# Patient Record
Sex: MALE | Race: AMERICAN INDIAN OR ALASKA NATIVE | NOT HISPANIC OR LATINO | Employment: OTHER | URBAN - METROPOLITAN AREA
[De-identification: names, ages, dates, MRNs, and addresses within clinical notes are randomized per-mention and may not be internally consistent; named-entity substitution may affect disease eponyms.]

---

## 2020-10-01 ENCOUNTER — APPOINTMENT (OUTPATIENT)
Dept: RADIOLOGY | Facility: MEDICAL CENTER | Age: 60
End: 2020-10-01
Attending: EMERGENCY MEDICINE
Payer: MEDICARE

## 2020-10-01 ENCOUNTER — HOSPITAL ENCOUNTER (OUTPATIENT)
Facility: MEDICAL CENTER | Age: 60
End: 2020-10-03
Attending: EMERGENCY MEDICINE | Admitting: INTERNAL MEDICINE
Payer: MEDICARE

## 2020-10-01 DIAGNOSIS — I20.0 UNSTABLE ANGINA (HCC): ICD-10-CM

## 2020-10-01 PROBLEM — E11.9 TYPE 2 DIABETES MELLITUS, WITHOUT LONG-TERM CURRENT USE OF INSULIN (HCC): Status: ACTIVE | Noted: 2020-10-01

## 2020-10-01 PROBLEM — R42 POSTURAL DIZZINESS WITH PRESYNCOPE: Status: ACTIVE | Noted: 2020-10-01

## 2020-10-01 PROBLEM — E87.29 HIGH ANION GAP METABOLIC ACIDOSIS: Status: ACTIVE | Noted: 2020-10-01

## 2020-10-01 PROBLEM — K21.9 GERD (GASTROESOPHAGEAL REFLUX DISEASE): Status: ACTIVE | Noted: 2020-10-01

## 2020-10-01 PROBLEM — R55 POSTURAL DIZZINESS WITH PRESYNCOPE: Status: ACTIVE | Noted: 2020-10-01

## 2020-10-01 LAB
ALBUMIN SERPL BCP-MCNC: 4.8 G/DL (ref 3.2–4.9)
ALBUMIN/GLOB SERPL: 1.3 G/DL
ALP SERPL-CCNC: 91 U/L (ref 30–99)
ALT SERPL-CCNC: 13 U/L (ref 2–50)
ANION GAP SERPL CALC-SCNC: 17 MMOL/L (ref 7–16)
APTT PPP: 29.5 SEC (ref 24.7–36)
AST SERPL-CCNC: 19 U/L (ref 12–45)
BASOPHILS # BLD AUTO: 0.6 % (ref 0–1.8)
BASOPHILS # BLD: 0.05 K/UL (ref 0–0.12)
BILIRUB SERPL-MCNC: 0.4 MG/DL (ref 0.1–1.5)
BUN SERPL-MCNC: 13 MG/DL (ref 8–22)
CALCIUM SERPL-MCNC: 9.8 MG/DL (ref 8.5–10.5)
CHLORIDE SERPL-SCNC: 98 MMOL/L (ref 96–112)
CO2 SERPL-SCNC: 19 MMOL/L (ref 20–33)
COVID ORDER STATUS COVID19: NORMAL
CREAT SERPL-MCNC: 0.89 MG/DL (ref 0.5–1.4)
EKG IMPRESSION: NORMAL
EOSINOPHIL # BLD AUTO: 0.34 K/UL (ref 0–0.51)
EOSINOPHIL NFR BLD: 4 % (ref 0–6.9)
ERYTHROCYTE [DISTWIDTH] IN BLOOD BY AUTOMATED COUNT: 46 FL (ref 35.9–50)
GLOBULIN SER CALC-MCNC: 3.6 G/DL (ref 1.9–3.5)
GLUCOSE BLD-MCNC: 149 MG/DL (ref 65–99)
GLUCOSE SERPL-MCNC: 151 MG/DL (ref 65–99)
HCT VFR BLD AUTO: 41.3 % (ref 42–52)
HGB BLD-MCNC: 13 G/DL (ref 14–18)
IMM GRANULOCYTES # BLD AUTO: 0.01 K/UL (ref 0–0.11)
IMM GRANULOCYTES NFR BLD AUTO: 0.1 % (ref 0–0.9)
INR PPP: 1.02 (ref 0.87–1.13)
LYMPHOCYTES # BLD AUTO: 2.88 K/UL (ref 1–4.8)
LYMPHOCYTES NFR BLD: 34 % (ref 22–41)
MCH RBC QN AUTO: 23.9 PG (ref 27–33)
MCHC RBC AUTO-ENTMCNC: 31.5 G/DL (ref 33.7–35.3)
MCV RBC AUTO: 75.8 FL (ref 81.4–97.8)
MONOCYTES # BLD AUTO: 0.58 K/UL (ref 0–0.85)
MONOCYTES NFR BLD AUTO: 6.8 % (ref 0–13.4)
NEUTROPHILS # BLD AUTO: 4.62 K/UL (ref 1.82–7.42)
NEUTROPHILS NFR BLD: 54.5 % (ref 44–72)
NRBC # BLD AUTO: 0 K/UL
NRBC BLD-RTO: 0 /100 WBC
PLATELET # BLD AUTO: 405 K/UL (ref 164–446)
PMV BLD AUTO: 10.4 FL (ref 9–12.9)
POTASSIUM SERPL-SCNC: 4.4 MMOL/L (ref 3.6–5.5)
PROT SERPL-MCNC: 8.4 G/DL (ref 6–8.2)
PROTHROMBIN TIME: 13.7 SEC (ref 12–14.6)
RBC # BLD AUTO: 5.45 M/UL (ref 4.7–6.1)
SARS-COV-2 RNA RESP QL NAA+PROBE: NOTDETECTED
SODIUM SERPL-SCNC: 134 MMOL/L (ref 135–145)
SPECIMEN SOURCE: NORMAL
TROPONIN T SERPL-MCNC: 10 NG/L (ref 6–19)
TROPONIN T SERPL-MCNC: 11 NG/L (ref 6–19)
TROPONIN T SERPL-MCNC: 11 NG/L (ref 6–19)
UFH PPP CHRO-ACNC: <0.1 IU/ML
WBC # BLD AUTO: 8.5 K/UL (ref 4.8–10.8)

## 2020-10-01 PROCEDURE — A9270 NON-COVERED ITEM OR SERVICE: HCPCS | Performed by: INTERNAL MEDICINE

## 2020-10-01 PROCEDURE — 93005 ELECTROCARDIOGRAM TRACING: CPT | Performed by: EMERGENCY MEDICINE

## 2020-10-01 PROCEDURE — 82962 GLUCOSE BLOOD TEST: CPT

## 2020-10-01 PROCEDURE — 700111 HCHG RX REV CODE 636 W/ 250 OVERRIDE (IP): Performed by: INTERNAL MEDICINE

## 2020-10-01 PROCEDURE — 85730 THROMBOPLASTIN TIME PARTIAL: CPT

## 2020-10-01 PROCEDURE — 99285 EMERGENCY DEPT VISIT HI MDM: CPT

## 2020-10-01 PROCEDURE — G0378 HOSPITAL OBSERVATION PER HR: HCPCS

## 2020-10-01 PROCEDURE — 93005 ELECTROCARDIOGRAM TRACING: CPT

## 2020-10-01 PROCEDURE — 85610 PROTHROMBIN TIME: CPT

## 2020-10-01 PROCEDURE — A9270 NON-COVERED ITEM OR SERVICE: HCPCS | Performed by: EMERGENCY MEDICINE

## 2020-10-01 PROCEDURE — 99220 PR INITIAL OBSERVATION CARE,LEVL III: CPT | Performed by: INTERNAL MEDICINE

## 2020-10-01 PROCEDURE — 94760 N-INVAS EAR/PLS OXIMETRY 1: CPT

## 2020-10-01 PROCEDURE — 96374 THER/PROPH/DIAG INJ IV PUSH: CPT

## 2020-10-01 PROCEDURE — 700102 HCHG RX REV CODE 250 W/ 637 OVERRIDE(OP): Performed by: INTERNAL MEDICINE

## 2020-10-01 PROCEDURE — 85025 COMPLETE CBC W/AUTO DIFF WBC: CPT

## 2020-10-01 PROCEDURE — 84484 ASSAY OF TROPONIN QUANT: CPT

## 2020-10-01 PROCEDURE — 80053 COMPREHEN METABOLIC PANEL: CPT

## 2020-10-01 PROCEDURE — 71045 X-RAY EXAM CHEST 1 VIEW: CPT

## 2020-10-01 PROCEDURE — U0003 INFECTIOUS AGENT DETECTION BY NUCLEIC ACID (DNA OR RNA); SEVERE ACUTE RESPIRATORY SYNDROME CORONAVIRUS 2 (SARS-COV-2) (CORONAVIRUS DISEASE [COVID-19]), AMPLIFIED PROBE TECHNIQUE, MAKING USE OF HIGH THROUGHPUT TECHNOLOGIES AS DESCRIBED BY CMS-2020-01-R: HCPCS

## 2020-10-01 PROCEDURE — 700105 HCHG RX REV CODE 258: Performed by: INTERNAL MEDICINE

## 2020-10-01 PROCEDURE — 85520 HEPARIN ASSAY: CPT

## 2020-10-01 PROCEDURE — 700102 HCHG RX REV CODE 250 W/ 637 OVERRIDE(OP): Performed by: EMERGENCY MEDICINE

## 2020-10-01 PROCEDURE — 99205 OFFICE O/P NEW HI 60 MIN: CPT | Performed by: INTERNAL MEDICINE

## 2020-10-01 PROCEDURE — C9803 HOPD COVID-19 SPEC COLLECT: HCPCS | Performed by: INTERNAL MEDICINE

## 2020-10-01 PROCEDURE — 36415 COLL VENOUS BLD VENIPUNCTURE: CPT

## 2020-10-01 RX ORDER — PROMETHAZINE HYDROCHLORIDE 25 MG/1
12.5-25 TABLET ORAL EVERY 4 HOURS PRN
Status: DISCONTINUED | OUTPATIENT
Start: 2020-10-01 | End: 2020-10-03 | Stop reason: HOSPADM

## 2020-10-01 RX ORDER — ALPRAZOLAM 0.25 MG/1
1 TABLET ORAL
Status: ON HOLD | COMMUNITY
End: 2021-02-17

## 2020-10-01 RX ORDER — PROCHLORPERAZINE EDISYLATE 5 MG/ML
5-10 INJECTION INTRAMUSCULAR; INTRAVENOUS EVERY 4 HOURS PRN
Status: DISCONTINUED | OUTPATIENT
Start: 2020-10-01 | End: 2020-10-03 | Stop reason: HOSPADM

## 2020-10-01 RX ORDER — HEPARIN SODIUM 1000 [USP'U]/ML
40 INJECTION, SOLUTION INTRAVENOUS; SUBCUTANEOUS PRN
Status: DISCONTINUED | OUTPATIENT
Start: 2020-10-01 | End: 2020-10-02

## 2020-10-01 RX ORDER — TRAMADOL HYDROCHLORIDE 50 MG/1
50 TABLET ORAL 3 TIMES DAILY PRN
Status: DISCONTINUED | OUTPATIENT
Start: 2020-10-01 | End: 2020-10-03 | Stop reason: HOSPADM

## 2020-10-01 RX ORDER — ROSUVASTATIN CALCIUM 40 MG/1
40 TABLET, COATED ORAL EVERY EVENING
COMMUNITY
End: 2021-05-24 | Stop reason: SDUPTHER

## 2020-10-01 RX ORDER — CLOPIDOGREL BISULFATE 75 MG/1
75 TABLET ORAL DAILY
Status: ON HOLD | COMMUNITY
End: 2020-10-03

## 2020-10-01 RX ORDER — ONDANSETRON 4 MG/1
4 TABLET, ORALLY DISINTEGRATING ORAL EVERY 4 HOURS PRN
Status: DISCONTINUED | OUTPATIENT
Start: 2020-10-01 | End: 2020-10-03 | Stop reason: HOSPADM

## 2020-10-01 RX ORDER — AMOXICILLIN 250 MG
2 CAPSULE ORAL 2 TIMES DAILY
Status: DISCONTINUED | OUTPATIENT
Start: 2020-10-01 | End: 2020-10-03 | Stop reason: HOSPADM

## 2020-10-01 RX ORDER — DEXTROSE MONOHYDRATE 25 G/50ML
50 INJECTION, SOLUTION INTRAVENOUS
Status: DISCONTINUED | OUTPATIENT
Start: 2020-10-01 | End: 2020-10-02

## 2020-10-01 RX ORDER — PROMETHAZINE HYDROCHLORIDE 25 MG/1
12.5-25 SUPPOSITORY RECTAL EVERY 4 HOURS PRN
Status: DISCONTINUED | OUTPATIENT
Start: 2020-10-01 | End: 2020-10-03 | Stop reason: HOSPADM

## 2020-10-01 RX ORDER — AMLODIPINE BESYLATE 10 MG/1
10 TABLET ORAL DAILY
Status: DISCONTINUED | OUTPATIENT
Start: 2020-10-01 | End: 2020-10-03 | Stop reason: HOSPADM

## 2020-10-01 RX ORDER — METOPROLOL SUCCINATE 50 MG/1
50 TABLET, EXTENDED RELEASE ORAL DAILY
Status: ON HOLD | COMMUNITY
End: 2020-10-03

## 2020-10-01 RX ORDER — OMEPRAZOLE 40 MG/1
40 CAPSULE, DELAYED RELEASE ORAL 2 TIMES DAILY
COMMUNITY
End: 2021-06-04 | Stop reason: SDUPTHER

## 2020-10-01 RX ORDER — HYDROCODONE BITARTRATE AND ACETAMINOPHEN 5; 325 MG/1; MG/1
1 TABLET ORAL
COMMUNITY
End: 2020-12-10

## 2020-10-01 RX ORDER — ISOSORBIDE MONONITRATE 30 MG/1
60 TABLET, EXTENDED RELEASE ORAL EVERY EVENING
Status: DISCONTINUED | OUTPATIENT
Start: 2020-10-01 | End: 2020-10-03 | Stop reason: HOSPADM

## 2020-10-01 RX ORDER — RANOLAZINE 500 MG/1
1000 TABLET, EXTENDED RELEASE ORAL 2 TIMES DAILY
Status: DISCONTINUED | OUTPATIENT
Start: 2020-10-01 | End: 2020-10-02

## 2020-10-01 RX ORDER — METFORMIN HYDROCHLORIDE 500 MG/1
500 TABLET, EXTENDED RELEASE ORAL 3 TIMES DAILY
COMMUNITY
End: 2021-05-10 | Stop reason: SDUPTHER

## 2020-10-01 RX ORDER — CLOPIDOGREL BISULFATE 75 MG/1
75 TABLET ORAL DAILY
Status: DISCONTINUED | OUTPATIENT
Start: 2020-10-01 | End: 2020-10-02

## 2020-10-01 RX ORDER — METOPROLOL SUCCINATE 50 MG/1
50 TABLET, EXTENDED RELEASE ORAL DAILY
Status: DISCONTINUED | OUTPATIENT
Start: 2020-10-01 | End: 2020-10-02

## 2020-10-01 RX ORDER — MORPHINE SULFATE 4 MG/ML
2 INJECTION, SOLUTION INTRAMUSCULAR; INTRAVENOUS
Status: DISCONTINUED | OUTPATIENT
Start: 2020-10-01 | End: 2020-10-01

## 2020-10-01 RX ORDER — HEPARIN SODIUM 5000 [USP'U]/100ML
0-30 INJECTION, SOLUTION INTRAVENOUS CONTINUOUS
Status: DISCONTINUED | OUTPATIENT
Start: 2020-10-01 | End: 2020-10-02

## 2020-10-01 RX ORDER — HEPARIN SODIUM 1000 [USP'U]/ML
80 INJECTION, SOLUTION INTRAVENOUS; SUBCUTANEOUS ONCE
Status: COMPLETED | OUTPATIENT
Start: 2020-10-01 | End: 2020-10-01

## 2020-10-01 RX ORDER — TRAMADOL HYDROCHLORIDE 50 MG/1
50 TABLET ORAL 3 TIMES DAILY PRN
COMMUNITY
End: 2021-02-22

## 2020-10-01 RX ORDER — ACETAMINOPHEN 325 MG/1
650 TABLET ORAL EVERY 6 HOURS PRN
Status: DISCONTINUED | OUTPATIENT
Start: 2020-10-01 | End: 2020-10-03 | Stop reason: HOSPADM

## 2020-10-01 RX ORDER — NITROGLYCERIN 0.4 MG/1
0.4 TABLET SUBLINGUAL PRN
Status: ON HOLD | COMMUNITY
End: 2021-02-17

## 2020-10-01 RX ORDER — ONDANSETRON 2 MG/ML
4 INJECTION INTRAMUSCULAR; INTRAVENOUS EVERY 4 HOURS PRN
Status: DISCONTINUED | OUTPATIENT
Start: 2020-10-01 | End: 2020-10-03 | Stop reason: HOSPADM

## 2020-10-01 RX ORDER — AMLODIPINE BESYLATE 10 MG/1
10 TABLET ORAL DAILY
COMMUNITY
End: 2021-06-02 | Stop reason: SDUPTHER

## 2020-10-01 RX ORDER — POLYETHYLENE GLYCOL 3350 17 G/17G
1 POWDER, FOR SOLUTION ORAL
Status: DISCONTINUED | OUTPATIENT
Start: 2020-10-01 | End: 2020-10-03 | Stop reason: HOSPADM

## 2020-10-01 RX ORDER — ENALAPRILAT 1.25 MG/ML
1.25 INJECTION INTRAVENOUS EVERY 6 HOURS PRN
Status: DISCONTINUED | OUTPATIENT
Start: 2020-10-01 | End: 2020-10-03 | Stop reason: HOSPADM

## 2020-10-01 RX ORDER — SODIUM CHLORIDE 9 MG/ML
INJECTION, SOLUTION INTRAVENOUS CONTINUOUS
Status: DISCONTINUED | OUTPATIENT
Start: 2020-10-01 | End: 2020-10-02

## 2020-10-01 RX ORDER — OXYCODONE HYDROCHLORIDE 5 MG/1
2.5 TABLET ORAL
Status: DISCONTINUED | OUTPATIENT
Start: 2020-10-01 | End: 2020-10-01

## 2020-10-01 RX ORDER — ROSUVASTATIN CALCIUM 20 MG/1
40 TABLET, COATED ORAL EVERY EVENING
Status: DISCONTINUED | OUTPATIENT
Start: 2020-10-01 | End: 2020-10-03 | Stop reason: HOSPADM

## 2020-10-01 RX ORDER — RANOLAZINE 1000 MG/1
1000 TABLET, EXTENDED RELEASE ORAL 2 TIMES DAILY
Status: ON HOLD | COMMUNITY
End: 2020-10-03

## 2020-10-01 RX ORDER — OXYCODONE HYDROCHLORIDE 5 MG/1
5 TABLET ORAL
Status: DISCONTINUED | OUTPATIENT
Start: 2020-10-01 | End: 2020-10-01

## 2020-10-01 RX ORDER — ISOSORBIDE MONONITRATE 60 MG/1
60 TABLET, EXTENDED RELEASE ORAL DAILY
COMMUNITY
End: 2021-03-22 | Stop reason: SDUPTHER

## 2020-10-01 RX ORDER — BISACODYL 10 MG
10 SUPPOSITORY, RECTAL RECTAL
Status: DISCONTINUED | OUTPATIENT
Start: 2020-10-01 | End: 2020-10-03 | Stop reason: HOSPADM

## 2020-10-01 RX ORDER — OMEPRAZOLE 20 MG/1
20 CAPSULE, DELAYED RELEASE ORAL DAILY
Status: DISCONTINUED | OUTPATIENT
Start: 2020-10-01 | End: 2020-10-03 | Stop reason: HOSPADM

## 2020-10-01 RX ADMIN — HEPARIN SODIUM 5700 UNITS: 1000 INJECTION, SOLUTION INTRAVENOUS; SUBCUTANEOUS at 11:40

## 2020-10-01 RX ADMIN — OMEPRAZOLE 20 MG: 20 CAPSULE, DELAYED RELEASE ORAL at 15:00

## 2020-10-01 RX ADMIN — ROSUVASTATIN CALCIUM 40 MG: 20 TABLET, FILM COATED ORAL at 16:53

## 2020-10-01 RX ADMIN — CLOPIDOGREL BISULFATE 75 MG: 75 TABLET ORAL at 16:52

## 2020-10-01 RX ADMIN — HEPARIN SODIUM 18 UNITS/KG/HR: 5000 INJECTION, SOLUTION INTRAVENOUS at 11:40

## 2020-10-01 RX ADMIN — SODIUM CHLORIDE: 9 INJECTION, SOLUTION INTRAVENOUS at 12:45

## 2020-10-01 RX ADMIN — ISOSORBIDE MONONITRATE 60 MG: 30 TABLET, EXTENDED RELEASE ORAL at 20:08

## 2020-10-01 RX ADMIN — TRAMADOL HYDROCHLORIDE 50 MG: 50 TABLET, FILM COATED ORAL at 21:58

## 2020-10-01 RX ADMIN — RANOLAZINE 1000 MG: 500 TABLET, FILM COATED, EXTENDED RELEASE ORAL at 20:09

## 2020-10-01 RX ADMIN — NITROGLYCERIN 1 INCH: 20 OINTMENT TOPICAL at 08:55

## 2020-10-01 RX ADMIN — SODIUM CHLORIDE: 9 INJECTION, SOLUTION INTRAVENOUS at 16:46

## 2020-10-01 RX ADMIN — METOPROLOL SUCCINATE 50 MG: 50 TABLET, EXTENDED RELEASE ORAL at 16:52

## 2020-10-01 SDOH — HEALTH STABILITY: MENTAL HEALTH: HOW OFTEN DO YOU HAVE 6 OR MORE DRINKS ON ONE OCCASION?: NEVER

## 2020-10-01 SDOH — ECONOMIC STABILITY: FOOD INSECURITY: WITHIN THE PAST 12 MONTHS, YOU WORRIED THAT YOUR FOOD WOULD RUN OUT BEFORE YOU GOT MONEY TO BUY MORE.: PATIENT DECLINED

## 2020-10-01 SDOH — ECONOMIC STABILITY: TRANSPORTATION INSECURITY
IN THE PAST 12 MONTHS, HAS LACK OF TRANSPORTATION KEPT YOU FROM MEETINGS, WORK, OR FROM GETTING THINGS NEEDED FOR DAILY LIVING?: PATIENT DECLINED

## 2020-10-01 SDOH — HEALTH STABILITY: MENTAL HEALTH: HOW OFTEN DO YOU HAVE A DRINK CONTAINING ALCOHOL?: NEVER

## 2020-10-01 SDOH — ECONOMIC STABILITY: TRANSPORTATION INSECURITY
IN THE PAST 12 MONTHS, HAS THE LACK OF TRANSPORTATION KEPT YOU FROM MEDICAL APPOINTMENTS OR FROM GETTING MEDICATIONS?: PATIENT DECLINED

## 2020-10-01 SDOH — ECONOMIC STABILITY: FOOD INSECURITY: WITHIN THE PAST 12 MONTHS, THE FOOD YOU BOUGHT JUST DIDN'T LAST AND YOU DIDN'T HAVE MONEY TO GET MORE.: PATIENT DECLINED

## 2020-10-01 ASSESSMENT — ENCOUNTER SYMPTOMS
BLURRED VISION: 0
FEVER: 0
SHORTNESS OF BREATH: 1
COUGH: 0
VOMITING: 0
LOSS OF CONSCIOUSNESS: 0
DIARRHEA: 0
FALLS: 0
PALPITATIONS: 1
ABDOMINAL PAIN: 0
CHILLS: 0
WEAKNESS: 0
NERVOUS/ANXIOUS: 0
SORE THROAT: 0
NAUSEA: 0
DIAPHORESIS: 1
DIZZINESS: 1
DEPRESSION: 0
HEADACHES: 0
CONSTIPATION: 0

## 2020-10-01 ASSESSMENT — PAIN DESCRIPTION - PAIN TYPE: TYPE: CHRONIC PAIN

## 2020-10-01 NOTE — ED TRIAGE NOTES
Chief Complaint   Patient presents with   • Chest Pain     Pt reports CP and SOB that has increased the past 2 wks. pt reports extensive heart hx with multiple stents. Pt reports near syncopal episode upon exertion.    • Near Syncopal     Pt/staff masked and in appropriate PPE during encounter.

## 2020-10-01 NOTE — ASSESSMENT & PLAN NOTE
Patient underwent heart cath for which she was noted for high-grade ostial concentric stenosis at the  diagonal branch originating through the left anterior descending artery stent strut into the mid portion   PCI was attempted but unsuccessful  Switched to Prasugrel  His beta-blocker dose was increased  Monitoring telemetry overnight

## 2020-10-01 NOTE — ASSESSMENT & PLAN NOTE
Hold outpatient metformin.  Start on insulin sliding scale.  Check A1c  Adjust diabetic management prior to discharge

## 2020-10-01 NOTE — ED PROVIDER NOTES
ED Provider Note    Scribed for Demarcus Ramos M.D. by Court Collazo. 10/1/2020  8:26 AM    Primary care provider: No primary care provider noted.  Means of arrival: Walk in   History obtained from: Patient   History limited by: None     CHIEF COMPLAINT  Chief Complaint   Patient presents with   • Chest Pain     Pt reports CP and SOB that has increased the past 2 wks. pt reports extensive heart hx with multiple stents. Pt reports near syncopal episode upon exertion.    • Near Syncopal       HPI  Gabino Mckeon is a 60 y.o. male who presents to the Emergency Department for chest pain onset 2 weeks ago. The patient states that his symptoms come and go and have worsened over time. He localizes his pain to the left side of his chest, and states that it radiates to his right arm. He describes it as dull in quality. He says that he experiences them multiple times a day. He reports associated shortness of breath, diaphoresis, and a near syncopal episode upon exertion. Patient says he has had 8 cardiac stents placed, the last one being placed in February. He also reports a history of diabetes, hypertension, and bypass, however he denies any history of heart attacks. Patient denies cough, congestion, or fever. He also states that he used to smoke cigarettes, but does not smoke anymore.     REVIEW OF SYSTEMS  Pertinent positives include chest pain, shortness of breath, diaphoresis, and a near syncopal episode upon exertion. Pertinent negatives include no cough, congestion, or fever.  All other systems reviewed and negative.    PAST MEDICAL HISTORY   has a past medical history of Anginal syndrome (HCC), At risk for sleep apnea, Back pain, Diabetes (HCC), Fall, H/O heart artery stent, and Hypertension.    SURGICAL HISTORY   has a past surgical history that includes other cardiac surgery; other abdominal surgery; and other orthopedic surgery.    SOCIAL HISTORY  Social History     Tobacco Use   • Smoking status: Former  "Smoker     Quit date: 10/1/2000     Years since quittin.0   • Smokeless tobacco: Never Used   Substance Use Topics   • Alcohol use: Never     Frequency: Never     Binge frequency: Never   • Drug use: Never      Social History     Substance and Sexual Activity   Drug Use Never       FAMILY HISTORY  Family History   Problem Relation Age of Onset   • Heart Disease Mother    • Hypertension Mother    • Heart Disease Father    • Hypertension Father    • Heart Disease Brother        CURRENT MEDICATIONS  Home Medications     Reviewed by Milton Forrester R.N. (Registered Nurse) on 10/01/20 at 1456  Med List Status: Complete   Medication Last Dose Status   ALPRAZolam (XANAX) 0.25 MG Tab 2020 Active   amLODIPine (NORVASC) 10 MG Tab 2020 Active   aspirin EC (ECOTRIN) 81 MG Tablet Delayed Response 10/1/2020 Active   clopidogrel (PLAVIX) 75 MG Tab 2020 Active   HYDROcodone-acetaminophen (NORCO) 5-325 MG Tab per tablet 2020 Active   isosorbide mononitrate SR (IMDUR) 60 MG TABLET SR 24 HR 2020 Active   MAGNESIUM PO 2020 Active   metFORMIN ER (GLUCOPHAGE XR) 500 MG TABLET SR 24 HR 2020 Active   metoprolol SR (TOPROL XL) 50 MG TABLET SR 24 HR 2020 Active   nitroglycerin (NITROSTAT) 0.4 MG SL Tab 2020 Active   omeprazole (PRILOSEC) 40 MG delayed-release capsule 2020 Active   Ranolazine 1000 MG TABLET SR 12 HR 2020 Active   rosuvastatin (CRESTOR) 40 MG tablet 2020 Active   tramadol (ULTRAM) 50 MG Tab 10/1/2020 Active                ALLERGIES  No Known Allergies    PHYSICAL EXAM  VITAL SIGNS: /92   Pulse 94   Temp 36 °C (96.8 °F) (Temporal)   Resp 18   Ht 1.626 m (5' 4\")   Wt 70.9 kg (156 lb 4.9 oz)   SpO2 99%   BMI 26.83 kg/m²     Constitutional: Well developed, Well nourished, mild distress, Non-toxic appearance.   HENT: Normocephalic, Atraumatic, Bilateral external ears normal, Oropharynx moist, No oral exudates.   Eyes: PERRLA, EOMI, Conjunctiva normal, No " discharge.   Neck: No tenderness, Supple, No stridor.   Lymphatic: No lymphadenopathy noted.   Cardiovascular: Normal heart rate, Normal rhythm. Large central sternal scar. No lower extremity edema.  Thorax & Lungs: Clear to auscultation bilaterally, No respiratory distress, No wheezing, No crackles.   Abdomen: Soft, No tenderness, No masses, No pulsatile masses.   Skin: Warm, Dry, No erythema, No rash.   Extremities:, No edema No cyanosis.   Musculoskeletal: No tenderness to palpation or major deformities noted.  Intact distal pulses  Neurologic: Awake, alert. Moves all extremities spontaneously.  Psychiatric: Affect normal, Judgment normal, Mood normal.     LABS  Results for orders placed or performed during the hospital encounter of 10/01/20   CBC with Differential   Result Value Ref Range    WBC 8.5 4.8 - 10.8 K/uL    RBC 5.45 4.70 - 6.10 M/uL    Hemoglobin 13.0 (L) 14.0 - 18.0 g/dL    Hematocrit 41.3 (L) 42.0 - 52.0 %    MCV 75.8 (L) 81.4 - 97.8 fL    MCH 23.9 (L) 27.0 - 33.0 pg    MCHC 31.5 (L) 33.7 - 35.3 g/dL    RDW 46.0 35.9 - 50.0 fL    Platelet Count 405 164 - 446 K/uL    MPV 10.4 9.0 - 12.9 fL    Neutrophils-Polys 54.50 44.00 - 72.00 %    Lymphocytes 34.00 22.00 - 41.00 %    Monocytes 6.80 0.00 - 13.40 %    Eosinophils 4.00 0.00 - 6.90 %    Basophils 0.60 0.00 - 1.80 %    Immature Granulocytes 0.10 0.00 - 0.90 %    Nucleated RBC 0.00 /100 WBC    Neutrophils (Absolute) 4.62 1.82 - 7.42 K/uL    Lymphs (Absolute) 2.88 1.00 - 4.80 K/uL    Monos (Absolute) 0.58 0.00 - 0.85 K/uL    Eos (Absolute) 0.34 0.00 - 0.51 K/uL    Baso (Absolute) 0.05 0.00 - 0.12 K/uL    Immature Granulocytes (abs) 0.01 0.00 - 0.11 K/uL    NRBC (Absolute) 0.00 K/uL   Complete Metabolic Panel (CMP)   Result Value Ref Range    Sodium 134 (L) 135 - 145 mmol/L    Potassium 4.4 3.6 - 5.5 mmol/L    Chloride 98 96 - 112 mmol/L    Co2 19 (L) 20 - 33 mmol/L    Anion Gap 17.0 (H) 7.0 - 16.0    Glucose 151 (H) 65 - 99 mg/dL    Bun 13 8 - 22 mg/dL     Creatinine 0.89 0.50 - 1.40 mg/dL    Calcium 9.8 8.5 - 10.5 mg/dL    AST(SGOT) 19 12 - 45 U/L    ALT(SGPT) 13 2 - 50 U/L    Alkaline Phosphatase 91 30 - 99 U/L    Total Bilirubin 0.4 0.1 - 1.5 mg/dL    Albumin 4.8 3.2 - 4.9 g/dL    Total Protein 8.4 (H) 6.0 - 8.2 g/dL    Globulin 3.6 (H) 1.9 - 3.5 g/dL    A-G Ratio 1.3 g/dL   Troponin STAT   Result Value Ref Range    Troponin T 11 6 - 19 ng/L   ESTIMATED GFR   Result Value Ref Range    GFR If African American >60 >60 mL/min/1.73 m 2    GFR If Non African American >60 >60 mL/min/1.73 m 2   Routine (COVID/SARS COV-2 In-House PCR up to 24 hours)    Specimen: Nasopharyngeal; Respirate   Result Value Ref Range    COVID Order Status Received    aPTT   Result Value Ref Range    APTT 29.5 24.7 - 36.0 sec   Prothrombin Time   Result Value Ref Range    PT 13.7 12.0 - 14.6 sec    INR 1.02 0.87 - 1.13   Heparin Xa (Unfractionated)   Result Value Ref Range    Heparin Xa (UFH) <0.10 IU/mL   TROPONIN   Result Value Ref Range    Troponin T 10 6 - 19 ng/L   TROPONIN   Result Value Ref Range    Troponin T 11 6 - 19 ng/L   SARS-CoV-2, PCR (In-House)   Result Value Ref Range    SARS-CoV-2 Source NP Swab    EKG   Result Value Ref Range    Report       [facility] Emergency Dept.    Test Date:  2020-10-01  Pt Name:    RORO HAMILTON                Department: ER  MRN:        [id number]                      Room:  Gender:     Male                         Technician: MISAEL  :        1960                   Requested By:ER TRIAGE PROTOCOL  Order #:    [id number]                    Reading MD: TALYA POWELL MD    Measurements  Intervals                                Axis  Rate:       93                           P:          52  CT:         136                          QRS:        21  QRSD:       86                           T:          126  QT:         352  QTc:        438    Interpretive Statements  SINUS RHYTHM  VENTRICULAR TRIGEMINY  BORDERLINE T  ABNORMALITIES, ANT-LAT LEADS  No previous ECG available for comparison  Electronically Signed On 10-1-2020 9:36:06 PDT by TALYA POWELL MD          RADIOLOGY  DX-CHEST-PORTABLE (1 VIEW)   Final Result      1.  There is no acute cardiopulmonary process.      EC-ECHOCARDIOGRAM COMPLETE W/O CONT    (Results Pending)     The radiologist's interpretation of all radiological studies have been reviewed by me.      COURSE & MEDICAL DECISION MAKING  Pertinent Labs & Imaging studies reviewed. (See chart for details)    I reviewed the patient's medical records which showed a history of multiple cardiac stents. The patient has just moved to [facility].    8:26 AM - Patient seen and examined at bedside. Ordered DX-Chest, CBC with differential, SMP, Troponin STAT, and EKG to evaluate his symptoms. The differential diagnoses include but are not limited to: ACS vs near syncope    9:28 AM - Paged Cardiology.    9:29 AM - Paged Hospitalist.    9:33 AM - I discussed the patient's case and the above findings with Dr. Hancock (Hospitalist) who agrees to admit the patient for hospitalization.      9:42 AM - I discussed the patient's case and the above findings with Dr. Good (Cardiology) who agrees to consult the patient.     PPE Note: I personally donned full PPE for all patient encounters during this visit, including being clean-shaven with an N95 respirator mask, gloves, and goggles.     Scribe remained outside the patient's room and did not have any contact with the patient for the duration of patient encounter.       Decision Making:  Patient with chest pain, worsening dyspnea on exertion, worsening chest pains consistent with unstable angina, EKG and troponins are negative, got cardiology involved in the patient's extensive history, likely we will perform a cardiac catheterization, discussed the case with hospitalist for hospitalization.    HTN/IDDM FOLLOW UP:  The patient has known hypertension and is being followed by their  primary care doctor    DISPOSITION:  Patient will be hospitalized by Dr. Hancock in guarded condition.     FINAL IMPRESSION  1. Unstable angina (HCC)          I, Court Collazo (Scribe), am scribing for, and in the presence of, Demarcus Ramos M.D..    Electronically signed by: Court Collazo (Miky), 10/1/2020    IDemarcus M.D. personally performed the services described in this documentation, as scribed by Court Collazo in my presence, and it is both accurate and complete. C    The note accurately reflects work and decisions made by me.  Demarcus Ramos M.D.  10/1/2020  4:16 PM

## 2020-10-01 NOTE — CONSULTS
CARDIAC CONSULTATION    Requesting Provider: Demarcus Ramos M.D.  Reason for consultation: Chest pain    Impressions:  #. Unstable angina   -Hx CABG 2014 and multiple PCI. Angio 2/2020 LIMA patent, other grafts occluded, PCI LM with 3.25 xience, PCI Cx with 3.5 Xience, unknown status of other stents   - Historically normal LVEF  #.  Near syncope   -History of recurrent vasovagal syncope  #.  ? Diabetes      Recommendations:  Echocardiogram, heparin infusion, continue home cardiac medications    Continue Nitropatch    Cath possible PCI today    Will follow    History: Gabino Mckeon is a 60 y.o. male with a past medical history of hypertension and diabetes who I have been consulted regarding chest discomfort.  For the past 3 weeks he has been experiencing on again off again chest discomfort.  It is reliably provoked with exertion and relieved with rest.  He has had several episodes also at rest.  Episodes last 10 to 15 minutes are felt in the center of the chest and relieved with nitroglycerin.  A few days ago he also felt near syncopal with prolonged standing and had to lie down.  He also experiences dyspnea on exertion.    He reports coronary artery disease history dating back to 2014.  At that time he had multivessel coronary artery disease and underwent 5 vessel bypass.  He was told that he could even have used 7 bypasses but there was not enough room.  Subsequently he had failure of the bypasses except the LIMA and is undergone several PCI procedures, most recently February 2020.  He experienced similar chest symptoms before the revascularization procedures.    He also reports a history of syncope and near syncope over the past several years.  Several months ago he fell down and injured his back    He is in the process of relocating from [facility] and dividing his time between the locations.  He and his wife recently moved to this area to be close to his son who recently started internal medicine  "residency at [employer].    ROS:  All other systems reviewed and negative except as per the HPI    PE:  /70   Pulse 79   Temp 36 °C (96.8 °F) (Temporal)   Resp 20   Ht 1.626 m (5' 4\")   Wt 70.9 kg (156 lb 4.9 oz)   SpO2 98%   BMI 26.83 kg/m²   GEN: Well appearing  HEENT: Symmetric face. Anicteric sclerae. Moist mucus membranes  NECK: No JVD. No lymphadenopathy  CARDIAC: Normal PMI, regular, normal S1, S2.   VASCULATURE: Normal carotid amplitude without bruit. Peripheral pulses normal  RESP: Clear to auscultation bilaterally  ABD: Soft, non-tender, non-distended  EXT: No edema, no clubbing or cyanosis  SKIN: Warm and dry  NEURO: No gross deficits  PSYCH: Appropriate affect, participates in conversation      Past Medical History:   Diagnosis Date   • H/O heart artery stent     multiple     History reviewed. No pertinent surgical history.  No Known Allergies      Current Facility-Administered Medications:   •  senna-docusate (PERICOLACE or SENOKOT S) 8.6-50 MG per tablet 2 Tab, 2 Tab, Oral, BID **AND** polyethylene glycol/lytes (MIRALAX) PACKET 1 Packet, 1 Packet, Oral, QDAY PRN **AND** magnesium hydroxide (MILK OF MAGNESIA) suspension 30 mL, 30 mL, Oral, QDAY PRN **AND** bisacodyl (DULCOLAX) suppository 10 mg, 10 mg, Rectal, QDAY PRN, JAYCEE LopezO.  •  acetaminophen (TYLENOL) tablet 650 mg, 650 mg, Oral, Q6HRS PRN, JAYCEE LopezO.  •  Notify provider if pain remains uncontrolled, , , CONTINUOUS **AND** Use the numeric rating scale (NRS-11) on regular floors and Critical-Care Pain Observation Tool (CPOT) on ICUs/Trauma to assess pain, , , CONTINUOUS **AND** Pulse Ox (Oximetry), , , CONTINUOUS **AND** Pharmacy Consult Request ...Pain Management Review 1 Each, 1 Each, Other, PHARMACY TO DOSE **AND** If patient difficult to arouse and/or has respiratory depression, stop any opiates that are currently infusing and call a Rapid Response., , , CONTINUOUS **AND** oxyCODONE immediate-release (ROXICODONE) " tablet 2.5 mg, 2.5 mg, Oral, Q3HRS PRN **AND** oxyCODONE immediate-release (ROXICODONE) tablet 5 mg, 5 mg, Oral, Q3HRS PRN **AND** morphine (pf) 4 mg/mL injection 2 mg, 2 mg, Intravenous, Q3HRS PRN, JAYCEE LopezO.  •  enalaprilat (VASOTEC) injection 1.25 mg, 1.25 mg, Intravenous, Q6HRS PRN, ROBERT Lopez.O.  •  ondansetron (ZOFRAN) syringe/vial injection 4 mg, 4 mg, Intravenous, Q4HRS PRN, ROBERT Lopez.O.  •  ondansetron (ZOFRAN ODT) dispertab 4 mg, 4 mg, Oral, Q4HRS PRN, ROBERT Lopez.O.  •  promethazine (PHENERGAN) tablet 12.5-25 mg, 12.5-25 mg, Oral, Q4HRS PRN, ROBERT Lopez.O.  •  promethazine (PHENERGAN) suppository 12.5-25 mg, 12.5-25 mg, Rectal, Q4HRS PRN, ROBERT Lopez.O.  •  prochlorperazine (COMPAZINE) injection 5-10 mg, 5-10 mg, Intravenous, Q4HRS PRN, ROBERT Lopez.O.  •  enoxaparin (LOVENOX) inj 40 mg, 40 mg, Subcutaneous, DAILY, ROBERT Lopez.O.  •  heparin infusion 25,000 units in 500 mL 0.45% NACL, 0-30 Units/kg/hr, Intravenous, Continuous, Augusto Good M.D.  •  heparin injection 5,700 Units, 80 Units/kg, Intravenous, Once, Augusto Good M.D.  •  heparin injection 2,800 Units, 40 Units/kg, Intravenous, PRN, Augusto Good M.D.    Current Outpatient Medications:   •  aspirin EC (ECOTRIN) 81 MG Tablet Delayed Response, Take 81 mg by mouth every day. Took 2 tabs 10/1/2020, Disp: , Rfl:   •  MAGNESIUM PO, Take 1 Tab by mouth every day., Disp: , Rfl:   •  HYDROcodone-acetaminophen (NORCO) 5-325 MG Tab per tablet, Take 1 Tab by mouth 1 time daily as needed., Disp: , Rfl:   •  amLODIPine (NORVASC) 10 MG Tab, Take 10 mg by mouth every day., Disp: , Rfl:   •  ALPRAZolam (XANAX) 0.25 MG Tab, Take 1 mg by mouth 1 time daily as needed., Disp: , Rfl:   •  clopidogrel (PLAVIX) 75 MG Tab, Take 75 mg by mouth every day., Disp: , Rfl:   •  isosorbide mononitrate SR (IMDUR) 60 MG TABLET SR 24 HR, Take 60 mg by mouth every day., Disp: , Rfl:   •  metFORMIN ER (GLUCOPHAGE  XR) 500 MG TABLET SR 24 HR, Take 500 mg by mouth 3 times a day., Disp: , Rfl:   •  omeprazole (PRILOSEC) 40 MG delayed-release capsule, Take 40 mg by mouth 2 times a day., Disp: , Rfl:   •  nitroglycerin (NITROSTAT) 0.4 MG SL Tab, Place 0.4 mg under tongue as needed., Disp: , Rfl:   •  metoprolol SR (TOPROL XL) 50 MG TABLET SR 24 HR, Take 50 mg by mouth every day., Disp: , Rfl:   •  Ranolazine 1000 MG TABLET SR 12 HR, Take 1,000 mg by mouth 2 Times a Day., Disp: , Rfl:   •  rosuvastatin (CRESTOR) 40 MG tablet, Take 40 mg by mouth every evening., Disp: , Rfl:   •  tramadol (ULTRAM) 50 MG Tab, Take 50 mg by mouth 3 times a day as needed., Disp: , Rfl:   (Not in a hospital admission)     Social History     Socioeconomic History   • Marital status:      Spouse name: Not on file   • Number of children: Not on file   • Years of education: Not on file   • Highest education level: Not on file   Occupational History   • Not on file   Social Needs   • Financial resource strain: Not on file   • Food insecurity     Worry: Not on file     Inability: Not on file   • Transportation needs     Medical: Not on file     Non-medical: Not on file   Tobacco Use   • Smoking status: Former Smoker   • Smokeless tobacco: Never Used   Substance and Sexual Activity   • Alcohol use: Never     Frequency: Never   • Drug use: Never   • Sexual activity: Not on file   Lifestyle   • Physical activity     Days per week: Not on file     Minutes per session: Not on file   • Stress: Not on file   Relationships   • Social connections     Talks on phone: Not on file     Gets together: Not on file     Attends Orthodox service: Not on file     Active member of club or organization: Not on file     Attends meetings of clubs or organizations: Not on file     Relationship status: Not on file   • Intimate partner violence     Fear of current or ex partner: Not on file     Emotionally abused: Not on file     Physically abused: Not on file     Forced  sexual activity: Not on file   Other Topics Concern   • Not on file   Social History Narrative   • Not on file       History reviewed. No pertinent family history.       Studies  No results found for: CHOLSTRLTOT, LDL, HDL, TRIGLYCERIDE    Lab Results   Component Value Date/Time    SODIUM 134 (L) 10/01/2020 08:25 AM    POTASSIUM 4.4 10/01/2020 08:25 AM    CHLORIDE 98 10/01/2020 08:25 AM    CO2 19 (L) 10/01/2020 08:25 AM    GLUCOSE 151 (H) 10/01/2020 08:25 AM    BUN 13 10/01/2020 08:25 AM    CREATININE 0.89 10/01/2020 08:25 AM     Lab Results   Component Value Date/Time    ALKPHOSPHAT 91 10/01/2020 08:25 AM    ASTSGOT 19 10/01/2020 08:25 AM    ALTSGPT 13 10/01/2020 08:25 AM    TBILIRUBIN 0.4 10/01/2020 08:25 AM      No results found for: BNPBTYPENAT    For this encounter I directly reviewed ECG tracings and medical records    Case discussed with Dr. Ramos

## 2020-10-01 NOTE — ED NOTES
Repeat troponin drawn and sent to lab. Bed assigned on T8, floor staff notified patient ready for transfer.

## 2020-10-01 NOTE — ED NOTES
Report from Elisha DACOSTA. Pt reports pain is improved after NTG paste, VSS. All results posted, ready for re-eval.

## 2020-10-01 NOTE — PROGRESS NOTES
Triage officer note    Gabino Mckeon 60 y.o. male with past medical history of coronary artery disease status post CABG presented to the hospital with complaint of chest pain and near syncope.  Initial troponin and EKG did not show any acute abnormalities.    ER physician is going to request consult with cardiology due to history of significant coronary artery disease and last stent placement in February.    I requested Dr. Leach to evaluate this patient for admission in CDU.    DX-CHEST-PORTABLE (1 VIEW)   Final Result      1.  There is no acute cardiopulmonary process.

## 2020-10-01 NOTE — ED NOTES
Ambulatory to restroom with steady gait. Coags and repeat trop sent to lab. Covid swab sent to lab. Heparin gtt started per MAR. Hospitalist at bedside.

## 2020-10-02 ENCOUNTER — APPOINTMENT (OUTPATIENT)
Dept: CARDIOLOGY | Facility: MEDICAL CENTER | Age: 60
End: 2020-10-02
Attending: INTERNAL MEDICINE
Payer: MEDICARE

## 2020-10-02 ENCOUNTER — APPOINTMENT (OUTPATIENT)
Dept: CARDIOLOGY | Facility: MEDICAL CENTER | Age: 60
End: 2020-10-02
Attending: NURSE PRACTITIONER
Payer: MEDICARE

## 2020-10-02 LAB
ANION GAP SERPL CALC-SCNC: 14 MMOL/L (ref 7–16)
APTT PPP: >240 SEC (ref 24.7–36)
BASOPHILS # BLD AUTO: 0.5 % (ref 0–1.8)
BASOPHILS # BLD: 0.04 K/UL (ref 0–0.12)
BUN SERPL-MCNC: 13 MG/DL (ref 8–22)
CALCIUM SERPL-MCNC: 9.1 MG/DL (ref 8.5–10.5)
CHLORIDE SERPL-SCNC: 102 MMOL/L (ref 96–112)
CO2 SERPL-SCNC: 20 MMOL/L (ref 20–33)
CREAT SERPL-MCNC: 0.85 MG/DL (ref 0.5–1.4)
EOSINOPHIL # BLD AUTO: 0.28 K/UL (ref 0–0.51)
EOSINOPHIL NFR BLD: 3.5 % (ref 0–6.9)
ERYTHROCYTE [DISTWIDTH] IN BLOOD BY AUTOMATED COUNT: 45.5 FL (ref 35.9–50)
EST. AVERAGE GLUCOSE BLD GHB EST-MCNC: 148 MG/DL
GLUCOSE BLD-MCNC: 125 MG/DL (ref 65–99)
GLUCOSE BLD-MCNC: 147 MG/DL (ref 65–99)
GLUCOSE BLD-MCNC: 182 MG/DL (ref 65–99)
GLUCOSE SERPL-MCNC: 161 MG/DL (ref 65–99)
HBA1C MFR BLD: 6.8 % (ref 0–5.6)
HCT VFR BLD AUTO: 34.1 % (ref 42–52)
HGB BLD-MCNC: 10.8 G/DL (ref 14–18)
IMM GRANULOCYTES # BLD AUTO: 0.03 K/UL (ref 0–0.11)
IMM GRANULOCYTES NFR BLD AUTO: 0.4 % (ref 0–0.9)
INR PPP: 1.24 (ref 0.87–1.13)
LYMPHOCYTES # BLD AUTO: 2.99 K/UL (ref 1–4.8)
LYMPHOCYTES NFR BLD: 37.1 % (ref 22–41)
MAGNESIUM SERPL-MCNC: 1.6 MG/DL (ref 1.5–2.5)
MCH RBC QN AUTO: 23.8 PG (ref 27–33)
MCHC RBC AUTO-ENTMCNC: 31.7 G/DL (ref 33.7–35.3)
MCV RBC AUTO: 75.1 FL (ref 81.4–97.8)
MONOCYTES # BLD AUTO: 0.52 K/UL (ref 0–0.85)
MONOCYTES NFR BLD AUTO: 6.4 % (ref 0–13.4)
NEUTROPHILS # BLD AUTO: 4.21 K/UL (ref 1.82–7.42)
NEUTROPHILS NFR BLD: 52.1 % (ref 44–72)
NRBC # BLD AUTO: 0 K/UL
NRBC BLD-RTO: 0 /100 WBC
PHOSPHATE SERPL-MCNC: 3.2 MG/DL (ref 2.5–4.5)
PLATELET # BLD AUTO: 331 K/UL (ref 164–446)
PMV BLD AUTO: 10.2 FL (ref 9–12.9)
POTASSIUM SERPL-SCNC: 3.5 MMOL/L (ref 3.6–5.5)
PROTHROMBIN TIME: 16 SEC (ref 12–14.6)
RBC # BLD AUTO: 4.54 M/UL (ref 4.7–6.1)
SODIUM SERPL-SCNC: 136 MMOL/L (ref 135–145)
UFH PPP CHRO-ACNC: 1 IU/ML
UFH PPP CHRO-ACNC: >1.1 IU/ML
WBC # BLD AUTO: 8.1 K/UL (ref 4.8–10.8)

## 2020-10-02 PROCEDURE — A9270 NON-COVERED ITEM OR SERVICE: HCPCS | Performed by: INTERNAL MEDICINE

## 2020-10-02 PROCEDURE — 83036 HEMOGLOBIN GLYCOSYLATED A1C: CPT

## 2020-10-02 PROCEDURE — 700102 HCHG RX REV CODE 250 W/ 637 OVERRIDE(OP): Performed by: STUDENT IN AN ORGANIZED HEALTH CARE EDUCATION/TRAINING PROGRAM

## 2020-10-02 PROCEDURE — 99214 OFFICE O/P EST MOD 30 MIN: CPT | Mod: 25 | Performed by: INTERNAL MEDICINE

## 2020-10-02 PROCEDURE — 93459 L HRT ART/GRFT ANGIO: CPT | Mod: 26 | Performed by: INTERNAL MEDICINE

## 2020-10-02 PROCEDURE — 93308 TTE F-UP OR LMTD: CPT

## 2020-10-02 PROCEDURE — 99152 MOD SED SAME PHYS/QHP 5/>YRS: CPT | Performed by: INTERNAL MEDICINE

## 2020-10-02 PROCEDURE — 84100 ASSAY OF PHOSPHORUS: CPT

## 2020-10-02 PROCEDURE — 82962 GLUCOSE BLOOD TEST: CPT

## 2020-10-02 PROCEDURE — 700102 HCHG RX REV CODE 250 W/ 637 OVERRIDE(OP): Performed by: INTERNAL MEDICINE

## 2020-10-02 PROCEDURE — 83735 ASSAY OF MAGNESIUM: CPT

## 2020-10-02 PROCEDURE — 85520 HEPARIN ASSAY: CPT | Mod: 91

## 2020-10-02 PROCEDURE — 96365 THER/PROPH/DIAG IV INF INIT: CPT

## 2020-10-02 PROCEDURE — 92920 PRQ TRLUML C ANGIOP 1ART&/BR: CPT | Mod: LD | Performed by: INTERNAL MEDICINE

## 2020-10-02 PROCEDURE — 96375 TX/PRO/DX INJ NEW DRUG ADDON: CPT

## 2020-10-02 PROCEDURE — 700111 HCHG RX REV CODE 636 W/ 250 OVERRIDE (IP): Performed by: STUDENT IN AN ORGANIZED HEALTH CARE EDUCATION/TRAINING PROGRAM

## 2020-10-02 PROCEDURE — 700111 HCHG RX REV CODE 636 W/ 250 OVERRIDE (IP)

## 2020-10-02 PROCEDURE — 700117 HCHG RX CONTRAST REV CODE 255: Performed by: INTERNAL MEDICINE

## 2020-10-02 PROCEDURE — G0378 HOSPITAL OBSERVATION PER HR: HCPCS

## 2020-10-02 PROCEDURE — 96376 TX/PRO/DX INJ SAME DRUG ADON: CPT

## 2020-10-02 PROCEDURE — 99225 PR SUBSEQUENT OBSERVATION CARE,LEVEL II: CPT | Performed by: STUDENT IN AN ORGANIZED HEALTH CARE EDUCATION/TRAINING PROGRAM

## 2020-10-02 PROCEDURE — 700101 HCHG RX REV CODE 250

## 2020-10-02 PROCEDURE — C1887 CATHETER, GUIDING: HCPCS

## 2020-10-02 PROCEDURE — 93308 TTE F-UP OR LMTD: CPT | Mod: 26 | Performed by: INTERNAL MEDICINE

## 2020-10-02 PROCEDURE — 85610 PROTHROMBIN TIME: CPT

## 2020-10-02 PROCEDURE — 700102 HCHG RX REV CODE 250 W/ 637 OVERRIDE(OP)

## 2020-10-02 PROCEDURE — A9270 NON-COVERED ITEM OR SERVICE: HCPCS | Performed by: STUDENT IN AN ORGANIZED HEALTH CARE EDUCATION/TRAINING PROGRAM

## 2020-10-02 PROCEDURE — 96366 THER/PROPH/DIAG IV INF ADDON: CPT

## 2020-10-02 PROCEDURE — 85730 THROMBOPLASTIN TIME PARTIAL: CPT

## 2020-10-02 PROCEDURE — 700111 HCHG RX REV CODE 636 W/ 250 OVERRIDE (IP): Performed by: INTERNAL MEDICINE

## 2020-10-02 PROCEDURE — 36415 COLL VENOUS BLD VENIPUNCTURE: CPT

## 2020-10-02 PROCEDURE — 85025 COMPLETE CBC W/AUTO DIFF WBC: CPT

## 2020-10-02 PROCEDURE — 80048 BASIC METABOLIC PNL TOTAL CA: CPT

## 2020-10-02 PROCEDURE — A9270 NON-COVERED ITEM OR SERVICE: HCPCS

## 2020-10-02 RX ORDER — MIDAZOLAM HYDROCHLORIDE 1 MG/ML
INJECTION INTRAMUSCULAR; INTRAVENOUS
Status: COMPLETED
Start: 2020-10-02 | End: 2020-10-02

## 2020-10-02 RX ORDER — METOPROLOL SUCCINATE 50 MG/1
100 TABLET, EXTENDED RELEASE ORAL DAILY
Status: DISCONTINUED | OUTPATIENT
Start: 2020-10-03 | End: 2020-10-03 | Stop reason: HOSPADM

## 2020-10-02 RX ORDER — PRASUGREL 10 MG/1
60 TABLET, FILM COATED ORAL ONCE
Status: DISCONTINUED | OUTPATIENT
Start: 2020-10-02 | End: 2020-10-02

## 2020-10-02 RX ORDER — MAGNESIUM SULFATE HEPTAHYDRATE 40 MG/ML
2 INJECTION, SOLUTION INTRAVENOUS ONCE
Status: COMPLETED | OUTPATIENT
Start: 2020-10-02 | End: 2020-10-02

## 2020-10-02 RX ORDER — BIVALIRUDIN 250 MG/5ML
INJECTION, POWDER, LYOPHILIZED, FOR SOLUTION INTRAVENOUS
Status: COMPLETED
Start: 2020-10-02 | End: 2020-10-02

## 2020-10-02 RX ORDER — SODIUM CHLORIDE 9 MG/ML
INJECTION, SOLUTION INTRAVENOUS CONTINUOUS
Status: DISCONTINUED | OUTPATIENT
Start: 2020-10-02 | End: 2020-10-02

## 2020-10-02 RX ORDER — PRASUGREL 10 MG/1
10 TABLET, FILM COATED ORAL DAILY
Status: DISCONTINUED | OUTPATIENT
Start: 2020-10-03 | End: 2020-10-03 | Stop reason: HOSPADM

## 2020-10-02 RX ORDER — HEPARIN SODIUM,PORCINE 1000/ML
VIAL (ML) INJECTION
Status: COMPLETED
Start: 2020-10-02 | End: 2020-10-02

## 2020-10-02 RX ORDER — POTASSIUM CHLORIDE 20 MEQ/1
40 TABLET, EXTENDED RELEASE ORAL ONCE
Status: COMPLETED | OUTPATIENT
Start: 2020-10-02 | End: 2020-10-02

## 2020-10-02 RX ORDER — DEXTROSE MONOHYDRATE 25 G/50ML
50 INJECTION, SOLUTION INTRAVENOUS
Status: DISCONTINUED | OUTPATIENT
Start: 2020-10-02 | End: 2020-10-03 | Stop reason: HOSPADM

## 2020-10-02 RX ORDER — DEXTROSE MONOHYDRATE 25 G/50ML
50 INJECTION, SOLUTION INTRAVENOUS
Status: DISCONTINUED | OUTPATIENT
Start: 2020-10-02 | End: 2020-10-02

## 2020-10-02 RX ORDER — LIDOCAINE HYDROCHLORIDE 20 MG/ML
INJECTION, SOLUTION INFILTRATION; PERINEURAL
Status: COMPLETED
Start: 2020-10-02 | End: 2020-10-02

## 2020-10-02 RX ORDER — HEPARIN SODIUM 200 [USP'U]/100ML
INJECTION, SOLUTION INTRAVENOUS
Status: COMPLETED
Start: 2020-10-02 | End: 2020-10-02

## 2020-10-02 RX ORDER — PRASUGREL 10 MG/1
TABLET, FILM COATED ORAL
Status: COMPLETED
Start: 2020-10-02 | End: 2020-10-02

## 2020-10-02 RX ORDER — VERAPAMIL HYDROCHLORIDE 2.5 MG/ML
INJECTION, SOLUTION INTRAVENOUS
Status: COMPLETED
Start: 2020-10-02 | End: 2020-10-02

## 2020-10-02 RX ADMIN — LIDOCAINE HYDROCHLORIDE: 20 INJECTION, SOLUTION INFILTRATION; PERINEURAL at 13:40

## 2020-10-02 RX ADMIN — MIDAZOLAM HYDROCHLORIDE 2 MG: 1 INJECTION, SOLUTION INTRAMUSCULAR; INTRAVENOUS at 13:41

## 2020-10-02 RX ADMIN — RANOLAZINE 1000 MG: 500 TABLET, FILM COATED, EXTENDED RELEASE ORAL at 18:15

## 2020-10-02 RX ADMIN — FENTANYL CITRATE 50 MCG: 50 INJECTION, SOLUTION INTRAMUSCULAR; INTRAVENOUS at 15:13

## 2020-10-02 RX ADMIN — ISOSORBIDE MONONITRATE 60 MG: 30 TABLET, EXTENDED RELEASE ORAL at 18:11

## 2020-10-02 RX ADMIN — ASPIRIN 81 MG: 81 TABLET, COATED ORAL at 06:02

## 2020-10-02 RX ADMIN — TRAMADOL HYDROCHLORIDE 50 MG: 50 TABLET, FILM COATED ORAL at 06:02

## 2020-10-02 RX ADMIN — MAGNESIUM SULFATE 2 G: 2 INJECTION INTRAVENOUS at 18:22

## 2020-10-02 RX ADMIN — IOHEXOL 162 ML: 350 INJECTION, SOLUTION INTRAVENOUS at 15:19

## 2020-10-02 RX ADMIN — TRAMADOL HYDROCHLORIDE 50 MG: 50 TABLET, FILM COATED ORAL at 19:46

## 2020-10-02 RX ADMIN — CLOPIDOGREL BISULFATE 75 MG: 75 TABLET ORAL at 06:03

## 2020-10-02 RX ADMIN — POTASSIUM CHLORIDE 40 MEQ: 1500 TABLET, EXTENDED RELEASE ORAL at 11:21

## 2020-10-02 RX ADMIN — RANOLAZINE 1000 MG: 500 TABLET, FILM COATED, EXTENDED RELEASE ORAL at 10:35

## 2020-10-02 RX ADMIN — MIDAZOLAM HYDROCHLORIDE 1 MG: 1 INJECTION, SOLUTION INTRAMUSCULAR; INTRAVENOUS at 15:13

## 2020-10-02 RX ADMIN — HEPARIN SODIUM: 1000 INJECTION, SOLUTION INTRAVENOUS; SUBCUTANEOUS at 13:41

## 2020-10-02 RX ADMIN — HEPARIN SODIUM 2000 UNITS: 200 INJECTION, SOLUTION INTRAVENOUS at 13:42

## 2020-10-02 RX ADMIN — VERAPAMIL HYDROCHLORIDE 5 MG: 5 INJECTION INTRAVENOUS at 13:40

## 2020-10-02 RX ADMIN — ROSUVASTATIN CALCIUM 40 MG: 20 TABLET, FILM COATED ORAL at 18:11

## 2020-10-02 RX ADMIN — NITROGLYCERIN 10 ML: 20 INJECTION INTRAVENOUS at 13:41

## 2020-10-02 RX ADMIN — FENTANYL CITRATE 100 MCG: 50 INJECTION, SOLUTION INTRAMUSCULAR; INTRAVENOUS at 13:41

## 2020-10-02 RX ADMIN — PRASUGREL 60 MG: 10 TABLET, FILM COATED ORAL at 15:13

## 2020-10-02 RX ADMIN — BIVALIRUDIN 250 MG: 250 INJECTION, POWDER, LYOPHILIZED, FOR SOLUTION INTRAVENOUS at 14:05

## 2020-10-02 RX ADMIN — HEPARIN SODIUM 15 UNITS/KG/HR: 5000 INJECTION, SOLUTION INTRAVENOUS at 10:04

## 2020-10-02 RX ADMIN — OMEPRAZOLE 20 MG: 20 CAPSULE, DELAYED RELEASE ORAL at 06:03

## 2020-10-02 ASSESSMENT — COGNITIVE AND FUNCTIONAL STATUS - GENERAL
SUGGESTED CMS G CODE MODIFIER DAILY ACTIVITY: CH
SUGGESTED CMS G CODE MODIFIER MOBILITY: CH
MOBILITY SCORE: 24
DAILY ACTIVITIY SCORE: 24

## 2020-10-02 ASSESSMENT — ENCOUNTER SYMPTOMS
CHILLS: 0
DIZZINESS: 0
SHORTNESS OF BREATH: 0
FEVER: 0
NECK PAIN: 0
COUGH: 0
MYALGIAS: 0
HEADACHES: 0
BLURRED VISION: 0
HEARTBURN: 0
ORTHOPNEA: 0
DOUBLE VISION: 0

## 2020-10-02 ASSESSMENT — PAIN DESCRIPTION - PAIN TYPE: TYPE: CHRONIC PAIN

## 2020-10-02 NOTE — PROGRESS NOTES
CARDIAC PROGRESS NOTE    Requesting Provider: Demarcus Ramos M.D.  Reason for consultation: Chest pain    Impressions:  #. Unstable angina   -Hx CABG 2014 and multiple PCI. Angio 2/2020 LIMA patent, other grafts occluded, PCI LM with 3.25 xience, PCI Cx with 3.5 Xience, unknown status of other stents   - Historically normal LVEF  #.  Near syncope   -History of recurrent vasovagal syncope  #.  ? Diabetes      Recommendations:  Echocardiogram, continue heparin drip    Cardiac catheterization today, pending results    Continue amlodipine 10 mg daily, ASA 81 mg daily, clopidogrel 75 mg daily, isosorbide mononitrate 60 mg every evening, metoprolol SR 50 mg daily, and rosuvastatin 40 mg every evening    Consider discontinuing ranolazine after cardiac catheterization as it is likely not truly beneficial    Recommend SGLT 2 inhibitor or GLP-1 receptor antagonist and more aggressive diabetic treatment      History: Gabino Mckeon is a 60 y.o. male with a past medical history of hypertension and diabetes who I have been consulted regarding chest discomfort.  For the past 3 weeks he has been experiencing on again off again chest discomfort.  It is reliably provoked with exertion and relieved with rest.  He has had several episodes also at rest.  Episodes last 10 to 15 minutes are felt in the center of the chest and relieved with nitroglycerin.  A few days ago he also felt near syncopal with prolonged standing and had to lie down.  He also experiences dyspnea on exertion.    He reports coronary artery disease history dating back to 2014.  At that time he had multivessel coronary artery disease and underwent 5 vessel bypass.  He was told that he could even have used 7 bypasses but there was not enough room.  Subsequently he had failure of the bypasses except the LIMA and is undergone several PCI procedures, most recently February 2020.  He experienced similar chest symptoms before the revascularization procedures.    He also  "reports a history of syncope and near syncope over the past several years.  Several months ago he fell down and injured his back    He is in the process of relocating from [facility] and dividing his time between the locations.  He and his wife recently moved to this area to be close to his son who recently started internal medicine residency at [facility].    ROS:  All other systems reviewed and negative except as per the HPI    PE:  BP (!) 96/64   Pulse 94   Temp 36.7 °C (98 °F) (Temporal)   Resp 20   Ht 1.626 m (5' 4\")   Wt 70.9 kg (156 lb 4.9 oz)   SpO2 94%   BMI 26.83 kg/m²   GEN: Well appearing  HEENT: Symmetric face. Anicteric sclerae. Moist mucus membranes  NECK: No JVD. No lymphadenopathy  CARDIAC: Normal PMI, regular, normal S1, S2.   VASCULATURE: Normal carotid amplitude without bruit. Peripheral pulses normal  RESP: Clear to auscultation bilaterally  ABD: Soft, non-tender, non-distended  EXT: No edema, no clubbing or cyanosis  SKIN: Warm and dry  NEURO: No gross deficits  PSYCH: Appropriate affect, participates in conversation      Past Medical History:   Diagnosis Date   • Anginal syndrome (HCC)    • At risk for sleep apnea    • Back pain    • Diabetes (HCC)    • Fall    • H/O heart artery stent     multiple   • Hypertension      Past Surgical History:   Procedure Laterality Date   • OTHER ABDOMINAL SURGERY     • OTHER CARDIAC SURGERY     • OTHER ORTHOPEDIC SURGERY       No Known Allergies      Current Facility-Administered Medications:   •  NS infusion, , Intravenous, Continuous, Augusto Good M.D., Stopped at 10/02/20 0030  •  lidocaine (XYLOCAINE) 1 % injection 0.5 mL, 0.5 mL, Intradermal, Once PRN, Augusto Good M.D.  •  magnesium sulfate IVPB premix 2 g, 2 g, Intravenous, Once, Terry Palmer M.D.  •  senna-docusate (PERICOLACE or SENOKOT S) 8.6-50 MG per tablet 2 Tab, 2 Tab, Oral, BID **AND** polyethylene glycol/lytes (MIRALAX) PACKET 1 Packet, 1 Packet, Oral, QDAY PRN " **AND** magnesium hydroxide (MILK OF MAGNESIA) suspension 30 mL, 30 mL, Oral, QDAY PRN **AND** bisacodyl (DULCOLAX) suppository 10 mg, 10 mg, Rectal, QDAY PRN, ROBERT Lopez.O.  •  acetaminophen (TYLENOL) tablet 650 mg, 650 mg, Oral, Q6HRS PRN, ROBERT Lopez.O.  •  Notify provider if pain remains uncontrolled, , , CONTINUOUS **AND** Use the numeric rating scale (NRS-11) on regular floors and Critical-Care Pain Observation Tool (CPOT) on ICUs/Trauma to assess pain, , , CONTINUOUS **AND** Pulse Ox (Oximetry), , , CONTINUOUS **AND** Pharmacy Consult Request ...Pain Management Review 1 Each, 1 Each, Other, PHARMACY TO DOSE **AND** If patient difficult to arouse and/or has respiratory depression, stop any opiates that are currently infusing and call a Rapid Response., , , CONTINUOUS **AND** [DISCONTINUED] oxyCODONE immediate-release (ROXICODONE) tablet 2.5 mg, 2.5 mg, Oral, Q3HRS PRN **AND** [DISCONTINUED] oxyCODONE immediate-release (ROXICODONE) tablet 5 mg, 5 mg, Oral, Q3HRS PRN **AND** [DISCONTINUED] morphine (pf) 4 mg/mL injection 2 mg, 2 mg, Intravenous, Q3HRS PRN, ROBERT Lopez.O.  •  enalaprilat (VASOTEC) injection 1.25 mg, 1.25 mg, Intravenous, Q6HRS PRN, ROBERT Lopez.O.  •  ondansetron (ZOFRAN) syringe/vial injection 4 mg, 4 mg, Intravenous, Q4HRS PRN, ROBERT Lopez.O.  •  ondansetron (ZOFRAN ODT) dispertab 4 mg, 4 mg, Oral, Q4HRS PRN, ROBERT Lopez.O.  •  promethazine (PHENERGAN) tablet 12.5-25 mg, 12.5-25 mg, Oral, Q4HRS PRN, ROBERT Lopez.O.  •  promethazine (PHENERGAN) suppository 12.5-25 mg, 12.5-25 mg, Rectal, Q4HRS PRN, Jere Leach D.O.  •  prochlorperazine (COMPAZINE) injection 5-10 mg, 5-10 mg, Intravenous, Q4HRS PRN, Jere Leach D.O.  •  heparin infusion 25,000 units in 500 mL 0.45% NACL, 0-30 Units/kg/hr, Intravenous, Continuous, Augusto Good M.D., Last Rate: 21.3 mL/hr at 10/02/20 1004, 15 Units/kg/hr at 10/02/20 1004  •  heparin injection 2,800 Units,  40 Units/kg, Intravenous, PRN, Augusto Good M.D.  •  NS infusion, , Intravenous, Continuous, Jere Leach D.O., Last Rate: 83 mL/hr at 10/01/20 1646  •  aspirin EC (ECOTRIN) tablet 81 mg, 81 mg, Oral, DAILY, JAYCEE LopezONaila, 81 mg at 10/02/20 0602  •  clopidogrel (PLAVIX) tablet 75 mg, 75 mg, Oral, DAILY, JAYCEE LopezONaila, 75 mg at 10/02/20 0603  •  metoprolol SR (TOPROL XL) tablet 50 mg, 50 mg, Oral, DAILY, JAYCEE LopezONaila, Stopped at 10/02/20 0900  •  isosorbide mononitrate SR (IMDUR) tablet 60 mg, 60 mg, Oral, Q EVENING, JAYCEE LopezO., 60 mg at 10/01/20 2008  •  ranolazine (RANEXA) 500 MG SR tablet TB12 1,000 mg, 1,000 mg, Oral, BID, ROBERT Lopez.O., 1,000 mg at 10/02/20 1035  •  rosuvastatin (CRESTOR) tablet 40 mg, 40 mg, Oral, Q EVENING, JAYCEE LopezO., 40 mg at 10/01/20 1653  •  amLODIPine (NORVASC) tablet 10 mg, 10 mg, Oral, DAILY, JAYCEE LopezO., Stopped at 10/02/20 0900  •  omeprazole (PRILOSEC) capsule 20 mg, 20 mg, Oral, DAILY, ROBERT Lopez.O., 20 mg at 10/02/20 0603  •  insulin regular (HumuLIN R,NovoLIN R) injection, 2-9 Units, Subcutaneous, Q6HRS, Stopped at 10/01/20 1800 **AND** POC Blood Glucose, , , Q6H **AND** NOTIFY MD and PharmD, , , Once **AND** glucose 4 g chewable tablet 16 g, 16 g, Oral, Q15 MIN PRN **AND** dextrose 50% (D50W) injection 50 mL, 50 mL, Intravenous, Q15 MIN PRN, Jere Leach D.O.  •  tramadol (ULTRAM) 50 MG tablet 50 mg, 50 mg, Oral, TID PRN, Milo Izquierdo M.D., 50 mg at 10/02/20 0602  Medications Prior to Admission   Medication Sig Dispense Refill Last Dose   • aspirin EC (ECOTRIN) 81 MG Tablet Delayed Response Take 81 mg by mouth every day. Took 2 tabs 10/1/2020   10/1/2020 at 0200   • MAGNESIUM PO Take 1 Tab by mouth every day.   9/29/2020 at 0930   • HYDROcodone-acetaminophen (NORCO) 5-325 MG Tab per tablet Take 1 Tab by mouth 1 time daily as needed.   9/27/2020 at Unknown time   • amLODIPine (NORVASC) 10 MG Tab  Take 10 mg by mouth every day.   2020 at 0930   • ALPRAZolam (XANAX) 0.25 MG Tab Take 1 mg by mouth 1 time daily as needed.   2020 at am   • clopidogrel (PLAVIX) 75 MG Tab Take 75 mg by mouth every day.   2020 at 0930   • isosorbide mononitrate SR (IMDUR) 60 MG TABLET SR 24 HR Take 60 mg by mouth every day.   2020 at 2100   • metFORMIN ER (GLUCOPHAGE XR) 500 MG TABLET SR 24 HR Take 500 mg by mouth 3 times a day.   2020 at 2100   • omeprazole (PRILOSEC) 40 MG delayed-release capsule Take 40 mg by mouth 2 times a day.   2020 at 2100   • nitroglycerin (NITROSTAT) 0.4 MG SL Tab Place 0.4 mg under tongue as needed.   2020 at 2100   • metoprolol SR (TOPROL XL) 50 MG TABLET SR 24 HR Take 50 mg by mouth every day.   2020 at 0930   • Ranolazine 1000 MG TABLET SR 12 HR Take 1,000 mg by mouth 2 Times a Day.   2020 at 0930   • rosuvastatin (CRESTOR) 40 MG tablet Take 40 mg by mouth every evening.   2020 at 2100   • tramadol (ULTRAM) 50 MG Tab Take 50 mg by mouth 3 times a day as needed.   10/1/2020 at 0500      Social History     Socioeconomic History   • Marital status:      Spouse name: Not on file   • Number of children: Not on file   • Years of education: Not on file   • Highest education level: Not on file   Occupational History   • Not on file   Social Needs   • Financial resource strain: Not on file   • Food insecurity     Worry: Patient refused     Inability: Patient refused   • Transportation needs     Medical: Patient refused     Non-medical: Patient refused   Tobacco Use   • Smoking status: Former Smoker     Quit date: 10/1/2000     Years since quittin.0   • Smokeless tobacco: Never Used   Substance and Sexual Activity   • Alcohol use: Never     Frequency: Never     Binge frequency: Never   • Drug use: Never   • Sexual activity: Not on file   Lifestyle   • Physical activity     Days per week: Not on file     Minutes per session: Not on file   • Stress: Not  on file   Relationships   • Social connections     Talks on phone: Not on file     Gets together: Not on file     Attends [employer] service: Not on file     Active member of club or organization: Not on file     Attends meetings of clubs or organizations: Not on file     Relationship status: Not on file   • Intimate partner violence     Fear of current or ex partner: Not on file     Emotionally abused: Not on file     Physically abused: Not on file     Forced sexual activity: Not on file   Other Topics Concern   • Not on file   Social History Narrative   • Not on file       Family History   Problem Relation Age of Onset   • Heart Disease Mother    • Hypertension Mother    • Heart Disease Father    • Hypertension Father    • Heart Disease Brother           Studies  No results found for: CHOLSTRLTOT, LDL, HDL, TRIGLYCERIDE    Lab Results   Component Value Date/Time    SODIUM 136 10/02/2020 12:05 AM    POTASSIUM 3.5 (L) 10/02/2020 12:05 AM    CHLORIDE 102 10/02/2020 12:05 AM    CO2 20 10/02/2020 12:05 AM    GLUCOSE 161 (H) 10/02/2020 12:05 AM    BUN 13 10/02/2020 12:05 AM    CREATININE 0.85 10/02/2020 12:05 AM     Lab Results   Component Value Date/Time    ALKPHOSPHAT 91 10/01/2020 08:25 AM    ASTSGOT 19 10/01/2020 08:25 AM    ALTSGPT 13 10/01/2020 08:25 AM    TBILIRUBIN 0.4 10/01/2020 08:25 AM      No results found for: BNPBTYPENAT    For this encounter I directly reviewed ECG tracings and medical records        MICHELE Kidd  [facility]  [phone]    No future appointments.    Please note that this dictation was created using voice recognition software. I have worked with consultants from the vendor as well as technical experts from [facility] to optimize the interface. I have made every reasonable attempt to correct obvious errors, but I expect that there are errors of grammar and possibly content I did not discover before finalizing the note.

## 2020-10-02 NOTE — PROGRESS NOTES
Hospital Medicine Daily Progress Note    Date of Service  10/2/2020    Chief Complaint  60 y.o. male admitted 10/1/2020 with unstable angina    Hospital Course    60-year-old male with a past medical history of hypertension, diabetes, coronary disease status post CABG who presented on 10/1/2020 with a two-week history of chest pain. EKG with no specific ST changes.  Cardiology was consulted for which patient underwent cardiac catheterization on 10/2/2020.       Interval Problem Update  Patient evaluated bedside and found AAOX4 and in NAD  Patient reported that he had severe substernal chest pain day prior to presentation which relieved with nitroglycerin  Chest pain better today  Patient underwent heart cath for which she was noted for high-grade ostial concentric stenosis at the  diagonal branch originating through the left anterior descending artery stent strut into the mid portion   PCI was attempted but unsuccessful  Switched to Prasugrel  His beta-blocker dose was increased  Readjust diabetic management prior to discharge  Monitoring telemetry overnight  Labs on AM    Consultants/Specialty  Cardiology  Interventional cardiology    Code Status  Full Code    Disposition  Stable overnight, likely to be discharged tomorrow morning    Review of Systems  Review of Systems   Constitutional: Negative for chills and fever.   HENT: Negative for hearing loss and tinnitus.    Eyes: Negative for blurred vision and double vision.   Respiratory: Negative for cough and shortness of breath.    Cardiovascular: Positive for chest pain. Negative for orthopnea.   Gastrointestinal: Negative for heartburn.   Genitourinary: Negative for dysuria and urgency.   Musculoskeletal: Negative for myalgias and neck pain.   Skin: Negative for itching and rash.   Neurological: Negative for dizziness and headaches.   Psychiatric/Behavioral: Negative for suicidal ideas.        Physical Exam  Temp:  [36.2 °C (97.2 °F)-36.9 °C (98.5 °F)] 36.7 °C (98  °F)  Pulse:  [] 94  Resp:  [11-37] 20  BP: ()/(56-90) 96/64  SpO2:  [92 %-99 %] 94 %    Physical Exam  Constitutional:       General: He is not in acute distress.     Appearance: Normal appearance.   HENT:      Head: Normocephalic and atraumatic.      Mouth/Throat:      Mouth: Mucous membranes are moist.   Eyes:      Extraocular Movements: Extraocular movements intact.      Pupils: Pupils are equal, round, and reactive to light.   Neck:      Musculoskeletal: Normal range of motion and neck supple.   Cardiovascular:      Rate and Rhythm: Normal rate and regular rhythm.   Pulmonary:      Effort: Pulmonary effort is normal. No respiratory distress.      Breath sounds: Normal breath sounds.   Abdominal:      General: Abdomen is flat. Bowel sounds are normal.      Palpations: Abdomen is soft.   Musculoskeletal: Normal range of motion.         General: No swelling.   Skin:     General: Skin is warm.      Coloration: Skin is not jaundiced.   Neurological:      General: No focal deficit present.      Mental Status: He is alert and oriented to person, place, and time.      Cranial Nerves: No cranial nerve deficit.   Psychiatric:         Mood and Affect: Mood normal.         Behavior: Behavior normal.         Thought Content: Thought content normal.         Judgment: Judgment normal.         Fluids  No intake or output data in the 24 hours ending 10/02/20 1146    Laboratory  Recent Labs     10/01/20  0825 10/02/20  0005   WBC 8.5 8.1   RBC 5.45 4.54*   HEMOGLOBIN 13.0* 10.8*   HEMATOCRIT 41.3* 34.1*   MCV 75.8* 75.1*   MCH 23.9* 23.8*   MCHC 31.5* 31.7*   RDW 46.0 45.5   PLATELETCT 405 331   MPV 10.4 10.2     Recent Labs     10/01/20  0825 10/02/20  0005   SODIUM 134* 136   POTASSIUM 4.4 3.5*   CHLORIDE 98 102   CO2 19* 20   GLUCOSE 151* 161*   BUN 13 13   CREATININE 0.89 0.85   CALCIUM 9.8 9.1     Recent Labs     10/01/20  1118 10/02/20  0005   APTT 29.5 >240.0*   INR 1.02 1.24*                Imaging  EC-ECHOCARDIOGRAM LTD W/O CONT         DX-CHEST-PORTABLE (1 VIEW)   Final Result      1.  There is no acute cardiopulmonary process.      EC-ECHOCARDIOGRAM COMPLETE W/O CONT    (Results Pending)   CL-LEFT HEART CATHETERIZATION WITH POSSIBLE INTERVENTION    (Results Pending)        Assessment/Plan  * Unstable angina (HCC)  Assessment & Plan  Patient underwent heart cath for which she was noted for high-grade ostial concentric stenosis at the  diagonal branch originating through the left anterior descending artery stent strut into the mid portion   PCI was attempted but unsuccessful  Switched to Prasugrel  His beta-blocker dose was increased  Monitoring telemetry overnight    Type 2 diabetes mellitus, without long-term current use of insulin ([facility])  Assessment & Plan  Hold outpatient metformin.  Start on insulin sliding scale.  Check A1c  Adjust diabetic management prior to discharge    GERD (gastroesophageal reflux disease)  Assessment & Plan  Continue outpatient omeprazole    High anion gap metabolic acidosis  Assessment & Plan  Resolved    Postural dizziness with presyncope  Assessment & Plan  Monitor on telemetry  Echocardiogram.  Check orthostatics.  Fluid resuscitation.       VTE prophylaxis: SCD

## 2020-10-02 NOTE — PROGRESS NOTES
Pt brought to floor and placed on telemetry. Pt assessed and admitted. Pt is free of pain and sob currently on a heparin drip running at 18 units/hr.     2 RN skin check completed with Lyndsay DACOSTA. Pt skin intact without issue.

## 2020-10-02 NOTE — PROGRESS NOTES
Patient requesting his at home PRN tramadol, currently has oxycodone ordered but does not take this. Page out to Dr. Izquierdo for orders.

## 2020-10-02 NOTE — PROCEDURES
CARDIAC CATHETERIZATION - PCI - REPORT    Referring Provider: myself    PROCEDURE PHYSICIAN: Augusto Good MD, [facility]  ASSISTANT: None      IMPRESSIONS:  1. Unstable angina due to severe stenosis of the diagonal- ISR, complex  2. Unsuccessful PCI of the Diagonal  3. Failure of ELCA atherectomy    Recommendations:  Change plavix to prasugrel     Increase BB    Usual post cath care    Pre-procedure diagnosis: Unstable angina  Post-procedure diagnosis: Same    Procedure performed  PCI- Angioplasty (PTCA of the ostail Diagonal)    Conscious sedation was supervised by myself and administered by trained personnel using fentanyl and versed between 1431 and 1520. The patient tolerated sedation without complication.     Procedure Description  Please see diagnostic description report by Dr. Adkins today.     1. My colleague identified severe stenosis at the origin of the diagonal vessel which was unable to be crossed with a balloon.  The patient had been anticoagulated with bivalirudin.  Given my familiarity with the ELCA system I elected to complete the interventional portion of the procedure.  The left main coronary artery was engaged with a 6 Fijian EBU 3.5 guiding catheter.  A run-through wire was passed into the diagonal branch with considerable difficulty across the ostium.  Using a 6 Fijian guide extension catheter I attempted to pass a 1.2 x 10 mm  compliant balloon across the stenosis.  That seemed to penetrate the proximal edge of the lesion and so angioplasty was performed at 8 nathaniel.  Further advancement of the balloon was not possible.  This was exchanged for a new 1.2 x 10 mm  balloon which again would not cross the lesion.  I then used a 0.9 mm ELCA laser catheter and abutted the lesion.  The consult was programmed to 40/40.  When the laser equipment was activated we received an error message indicating equipment failure.  As there was no ready solution to the equipment failure the  procedure was aborted.  There was concerned about substantial pulses paradoxus and so a stat echocardiogram was obtained which did not demonstrate any pericardial effusion.  Repeat angiography demonstrated unchanged coronary anatomy from the diagnostic images.     I suspect there was considerable difficulty crossing the origin of the diagonal as there was a previously placed stent which was jailed.  I suspect there were multiple stent layers which made it impossible to cross the lesion with our techniques.    Once all diagnostic information was obtained the equipment was removed from the body.     4. Hemostasis: Manual Compression      Results of intervention to the diagonal:  Pre: 95% stenosis and KITA III flow  Post: 95% residual stenosis and KITA III flow. No dissection or distal embolization.    Technical Factors  1. Complications: None  2. Estimated Blood Loss: <50 cc  3. Specimens: None  4. Contrast Volume: 162 ml

## 2020-10-02 NOTE — PROGRESS NOTES
Patient expressing frustration regarding not having a meal tray today. Per day shift patient was NPO most of day for heart cath that was never completed. Meal order placed for dinner but meal was not delievered, then meal provided was not kosher per order. New meal tray in route at this time per kitchen. Patient states he will be leaving tomorrow and have his cardiac workup completed outpatient. Emotional support provided to patient. Dayshift PRANEETH Rose spoke with patients Son via phone regarding the above. Patient denies further needs at this time.

## 2020-10-03 VITALS
RESPIRATION RATE: 15 BRPM | OXYGEN SATURATION: 100 % | TEMPERATURE: 98.8 F | SYSTOLIC BLOOD PRESSURE: 117 MMHG | WEIGHT: 156.31 LBS | BODY MASS INDEX: 26.69 KG/M2 | DIASTOLIC BLOOD PRESSURE: 69 MMHG | HEART RATE: 86 BPM | HEIGHT: 64 IN

## 2020-10-03 LAB
ALBUMIN SERPL BCP-MCNC: 3.8 G/DL (ref 3.2–4.9)
ALBUMIN/GLOB SERPL: 1.5 G/DL
ALP SERPL-CCNC: 69 U/L (ref 30–99)
ALT SERPL-CCNC: 9 U/L (ref 2–50)
ANION GAP SERPL CALC-SCNC: 10 MMOL/L (ref 7–16)
AST SERPL-CCNC: 7 U/L (ref 12–45)
BASOPHILS # BLD AUTO: 0.2 % (ref 0–1.8)
BASOPHILS # BLD: 0.02 K/UL (ref 0–0.12)
BILIRUB SERPL-MCNC: 0.4 MG/DL (ref 0.1–1.5)
BUN SERPL-MCNC: 12 MG/DL (ref 8–22)
CALCIUM SERPL-MCNC: 8.7 MG/DL (ref 8.5–10.5)
CHLORIDE SERPL-SCNC: 105 MMOL/L (ref 96–112)
CO2 SERPL-SCNC: 19 MMOL/L (ref 20–33)
CREAT SERPL-MCNC: 0.87 MG/DL (ref 0.5–1.4)
EOSINOPHIL # BLD AUTO: 0.02 K/UL (ref 0–0.51)
EOSINOPHIL NFR BLD: 0.2 % (ref 0–6.9)
ERYTHROCYTE [DISTWIDTH] IN BLOOD BY AUTOMATED COUNT: 45.4 FL (ref 35.9–50)
GLOBULIN SER CALC-MCNC: 2.5 G/DL (ref 1.9–3.5)
GLUCOSE BLD-MCNC: 144 MG/DL (ref 65–99)
GLUCOSE BLD-MCNC: 171 MG/DL (ref 65–99)
GLUCOSE SERPL-MCNC: 165 MG/DL (ref 65–99)
HCT VFR BLD AUTO: 29.3 % (ref 42–52)
HGB BLD-MCNC: 9.5 G/DL (ref 14–18)
IMM GRANULOCYTES # BLD AUTO: 0.03 K/UL (ref 0–0.11)
IMM GRANULOCYTES NFR BLD AUTO: 0.4 % (ref 0–0.9)
LV EJECT FRACT  99904: 65
LV EJECT FRACT MOD 2C 99903: 62.66
LV EJECT FRACT MOD 4C 99902: 71.27
LV EJECT FRACT MOD BP 99901: 65.74
LYMPHOCYTES # BLD AUTO: 0.84 K/UL (ref 1–4.8)
LYMPHOCYTES NFR BLD: 10.2 % (ref 22–41)
MAGNESIUM SERPL-MCNC: 2.1 MG/DL (ref 1.5–2.5)
MCH RBC QN AUTO: 24.3 PG (ref 27–33)
MCHC RBC AUTO-ENTMCNC: 32.4 G/DL (ref 33.7–35.3)
MCV RBC AUTO: 74.9 FL (ref 81.4–97.8)
MONOCYTES # BLD AUTO: 0.32 K/UL (ref 0–0.85)
MONOCYTES NFR BLD AUTO: 3.9 % (ref 0–13.4)
NEUTROPHILS # BLD AUTO: 7.02 K/UL (ref 1.82–7.42)
NEUTROPHILS NFR BLD: 85.1 % (ref 44–72)
NRBC # BLD AUTO: 0 K/UL
NRBC BLD-RTO: 0 /100 WBC
PHOSPHATE SERPL-MCNC: 1.9 MG/DL (ref 2.5–4.5)
PLATELET # BLD AUTO: 270 K/UL (ref 164–446)
PMV BLD AUTO: 10.2 FL (ref 9–12.9)
POTASSIUM SERPL-SCNC: 4.5 MMOL/L (ref 3.6–5.5)
PROT SERPL-MCNC: 6.3 G/DL (ref 6–8.2)
RBC # BLD AUTO: 3.91 M/UL (ref 4.7–6.1)
SODIUM SERPL-SCNC: 134 MMOL/L (ref 135–145)
WBC # BLD AUTO: 8.3 K/UL (ref 4.8–10.8)

## 2020-10-03 PROCEDURE — G0378 HOSPITAL OBSERVATION PER HR: HCPCS

## 2020-10-03 PROCEDURE — 85025 COMPLETE CBC W/AUTO DIFF WBC: CPT

## 2020-10-03 PROCEDURE — 700102 HCHG RX REV CODE 250 W/ 637 OVERRIDE(OP): Performed by: INTERNAL MEDICINE

## 2020-10-03 PROCEDURE — 99214 OFFICE O/P EST MOD 30 MIN: CPT | Performed by: INTERNAL MEDICINE

## 2020-10-03 PROCEDURE — 700102 HCHG RX REV CODE 250 W/ 637 OVERRIDE(OP): Performed by: STUDENT IN AN ORGANIZED HEALTH CARE EDUCATION/TRAINING PROGRAM

## 2020-10-03 PROCEDURE — 93306 TTE W/DOPPLER COMPLETE: CPT

## 2020-10-03 PROCEDURE — 36415 COLL VENOUS BLD VENIPUNCTURE: CPT

## 2020-10-03 PROCEDURE — 93306 TTE W/DOPPLER COMPLETE: CPT | Mod: 26 | Performed by: INTERNAL MEDICINE

## 2020-10-03 PROCEDURE — 99217 PR OBSERVATION CARE DISCHARGE: CPT | Performed by: STUDENT IN AN ORGANIZED HEALTH CARE EDUCATION/TRAINING PROGRAM

## 2020-10-03 PROCEDURE — A9270 NON-COVERED ITEM OR SERVICE: HCPCS | Performed by: INTERNAL MEDICINE

## 2020-10-03 PROCEDURE — 84100 ASSAY OF PHOSPHORUS: CPT

## 2020-10-03 PROCEDURE — 83735 ASSAY OF MAGNESIUM: CPT

## 2020-10-03 PROCEDURE — 97161 PT EVAL LOW COMPLEX 20 MIN: CPT

## 2020-10-03 PROCEDURE — A9270 NON-COVERED ITEM OR SERVICE: HCPCS | Performed by: STUDENT IN AN ORGANIZED HEALTH CARE EDUCATION/TRAINING PROGRAM

## 2020-10-03 PROCEDURE — 82962 GLUCOSE BLOOD TEST: CPT | Mod: 91

## 2020-10-03 PROCEDURE — 80053 COMPREHEN METABOLIC PANEL: CPT

## 2020-10-03 RX ORDER — METOPROLOL SUCCINATE 100 MG/1
100 TABLET, EXTENDED RELEASE ORAL DAILY
Qty: 30 TAB | Refills: 1 | Status: SHIPPED | OUTPATIENT
Start: 2020-10-04 | End: 2021-05-24 | Stop reason: SDUPTHER

## 2020-10-03 RX ORDER — PRASUGREL 10 MG/1
10 TABLET, FILM COATED ORAL DAILY
Qty: 30 TAB | Refills: 1 | Status: ON HOLD | OUTPATIENT
Start: 2020-10-04 | End: 2021-02-18

## 2020-10-03 RX ADMIN — METOPROLOL SUCCINATE 100 MG: 50 TABLET, EXTENDED RELEASE ORAL at 04:34

## 2020-10-03 RX ADMIN — OMEPRAZOLE 20 MG: 20 CAPSULE, DELAYED RELEASE ORAL at 04:34

## 2020-10-03 RX ADMIN — DIBASIC SODIUM PHOSPHATE, MONOBASIC POTASSIUM PHOSPHATE AND MONOBASIC SODIUM PHOSPHATE 250 MG: 852; 155; 130 TABLET ORAL at 10:06

## 2020-10-03 RX ADMIN — TRAMADOL HYDROCHLORIDE 50 MG: 50 TABLET, FILM COATED ORAL at 04:35

## 2020-10-03 RX ADMIN — PRASUGREL 10 MG: 10 TABLET, FILM COATED ORAL at 04:34

## 2020-10-03 ASSESSMENT — LIFESTYLE VARIABLES
EVER FELT BAD OR GUILTY ABOUT YOUR DRINKING: NO
TOTAL SCORE: 0
EVER HAD A DRINK FIRST THING IN THE MORNING TO STEADY YOUR NERVES TO GET RID OF A HANGOVER: NO
CONSUMPTION TOTAL: NEGATIVE
HAVE YOU EVER FELT YOU SHOULD CUT DOWN ON YOUR DRINKING: NO
AVERAGE NUMBER OF DAYS PER WEEK YOU HAVE A DRINK CONTAINING ALCOHOL: 0
HAVE PEOPLE ANNOYED YOU BY CRITICIZING YOUR DRINKING: NO
ALCOHOL_USE: NO
TOTAL SCORE: 0
TOTAL SCORE: 0
HOW MANY TIMES IN THE PAST YEAR HAVE YOU HAD 5 OR MORE DRINKS IN A DAY: 0
ON A TYPICAL DAY WHEN YOU DRINK ALCOHOL HOW MANY DRINKS DO YOU HAVE: 0

## 2020-10-03 ASSESSMENT — PAIN DESCRIPTION - PAIN TYPE: TYPE: CHRONIC PAIN

## 2020-10-03 ASSESSMENT — COGNITIVE AND FUNCTIONAL STATUS - GENERAL
SUGGESTED CMS G CODE MODIFIER MOBILITY: CH
MOBILITY SCORE: 24

## 2020-10-03 ASSESSMENT — GAIT ASSESSMENTS
DEVIATION: BRADYKINETIC
GAIT LEVEL OF ASSIST: SUPERVISED
DISTANCE (FEET): 300

## 2020-10-03 ASSESSMENT — PATIENT HEALTH QUESTIONNAIRE - PHQ9
1. LITTLE INTEREST OR PLEASURE IN DOING THINGS: NOT AT ALL
2. FEELING DOWN, DEPRESSED, IRRITABLE, OR HOPELESS: NOT AT ALL
SUM OF ALL RESPONSES TO PHQ9 QUESTIONS 1 AND 2: 0

## 2020-10-03 NOTE — DISCHARGE SUMMARY
Discharge Summary    CHIEF COMPLAINT ON ADMISSION  Chief Complaint   Patient presents with   • Chest Pain     Pt reports CP and SOB that has increased the past 2 wks. pt reports extensive heart hx with multiple stents. Pt reports near syncopal episode upon exertion.    • Near Syncopal       Reason for Admission  Chest Pain     Admission Date  10/1/2020    CODE STATUS  Full Code    HPI & HOSPITAL COURSE  60-year-old male with a past medical history of hypertension, diabetes, coronary disease status post CABG who presented on 10/1/2020 with a two-week history of chest pain. EKG with no specific ST changes.  Cardiology was consulted for which patient underwent cardiac catheterization on 10/2/2020.      He was noted for high-grade ostial concentric stenosis at the diagonal branch originating through the left anterior descending artery stent strut into the mid portion. Percutaneous coronary intervention was attempted but was unsuccessful. He was switched to Prasugrel, his beta-blocker dose was increased and he was started on Canaglifozin. Stable patient within chronic condition was discharge home and is to follow up with Cardiology as an out patient. He was instructed to return to the emergency department if symptoms worsen.       Therefore, he is discharged in good and stable condition to home with close outpatient follow-up.    The patient recovered much more quickly than anticipated on admission.    Discharge Date  10/3/2020    FOLLOW UP ITEMS POST DISCHARGE  Glycemic control with addition of Canaglifozin  Tolerance of increase in metoprolol       DISCHARGE DIAGNOSES  Principal Problem:    Unstable angina (HCC) POA: Unknown  Active Problems:    Postural dizziness with presyncope POA: Unknown    High anion gap metabolic acidosis POA: Unknown    GERD (gastroesophageal reflux disease) POA: Unknown    Type 2 diabetes mellitus, without long-term current use of insulin (HCC) POA: Unknown  Resolved Problems:    * No resolved  hospital problems. *      FOLLOW UP  No future appointments.  No follow-up provider specified.    MEDICATIONS ON DISCHARGE     Medication List      START taking these medications      Instructions   Canagliflozin 100 MG Tabs   Take 100 mg by mouth every day.  Dose: 100 mg     prasugrel 10 MG Tabs  Start taking on: October 4, 2020  Commonly known as: EFFIENT   Take 1 Tab by mouth every day.  Dose: 10 mg        CHANGE how you take these medications      Instructions   metoprolol  MG Tb24  Start taking on: October 4, 2020  What changed:   · medication strength  · how much to take  Commonly known as: TOPROL XL   Take 1 Tab by mouth every day.  Dose: 100 mg        CONTINUE taking these medications      Instructions   ALPRAZolam 0.25 MG Tabs  Commonly known as: XANAX   Take 1 mg by mouth 1 time daily as needed.  Dose: 1 mg     amLODIPine 10 MG Tabs  Commonly known as: NORVASC   Take 10 mg by mouth every day.  Dose: 10 mg     aspirin EC 81 MG Tbec  Commonly known as: ECOTRIN   Take 81 mg by mouth every day. Took 2 tabs 10/1/2020  Dose: 81 mg     HYDROcodone-acetaminophen 5-325 MG Tabs per tablet  Commonly known as: NORCO   Take 1 Tab by mouth 1 time daily as needed.  Dose: 1 Tab     isosorbide mononitrate SR 60 MG Tb24  Commonly known as: IMDUR   Take 60 mg by mouth every day.  Dose: 60 mg     MAGNESIUM PO   Take 1 Tab by mouth every day.  Dose: 1 Tab     metFORMIN  MG Tb24  Commonly known as: GLUCOPHAGE XR   Take 500 mg by mouth 3 times a day.  Dose: 500 mg     nitroglycerin 0.4 MG Subl  Commonly known as: NITROSTAT   Place 0.4 mg under tongue as needed.  Dose: 0.4 mg     omeprazole 40 MG delayed-release capsule  Commonly known as: PRILOSEC   Take 40 mg by mouth 2 times a day.  Dose: 40 mg     rosuvastatin 40 MG tablet  Commonly known as: CRESTOR   Take 40 mg by mouth every evening.  Dose: 40 mg     tramadol 50 MG Tabs  Commonly known as: ULTRAM   Take 50 mg by mouth 3 times a day as needed.  Dose: 50 mg         STOP taking these medications    clopidogrel 75 MG Tabs  Commonly known as: PLAVIX     Ranolazine 1000 MG Tb12            Allergies  No Known Allergies    DIET  Orders Placed This Encounter   Procedures   • Diet Order Cardiac     Standing Status:   Standing     Number of Occurrences:   1     Order Specific Question:   Diet:     Answer:   Cardiac [6]       ACTIVITY  As tolerated.  Weight bearing as tolerated    CONSULTATIONS  Cardiology  Interventional cardiology    PROCEDURES  1.  Left heart catheterization.  2.  Coronary angiography.  3.  Graft angiography.  4.  Attempted PCI of the ostial diagonal branch.  5.  Left ventriculogram.  6.  Monitor conscious sedation.    LABORATORY  Lab Results   Component Value Date    SODIUM 134 (L) 10/03/2020    POTASSIUM 4.5 10/03/2020    CHLORIDE 105 10/03/2020    CO2 19 (L) 10/03/2020    GLUCOSE 165 (H) 10/03/2020    BUN 12 10/03/2020    CREATININE 0.87 10/03/2020        Lab Results   Component Value Date    WBC 8.3 10/03/2020    HEMOGLOBIN 9.5 (L) 10/03/2020    HEMATOCRIT 29.3 (L) 10/03/2020    PLATELETCT 270 10/03/2020        Total time of the discharge process exceeds 55 minutes.

## 2020-10-03 NOTE — PROGRESS NOTES
Report received from Milton DACOSTA. Pt care assumed. Assessment performed. Pt AOx4 and very pleasant. Pt laying supine in bed. Pt denies pain and no signs of distress. Bed in low, locked position. Pt educated on calling to ambulate. Call light within reach. Treaded socks on pt.  Hourly rounding in place. Pt on bedrest and laying flat until 2030

## 2020-10-03 NOTE — THERAPY
"Physical Therapy   Initial Evaluation     Patient Name: Gabino Mckeon  Age:  60 y.o., Sex:  male  Medical Record #: [id number]  Today's Date: 10/3/2020     Precautions: Cardiac Precautions (See Comments)    Assessment  Patient is 60 y.o. male s/p cardiac cath presents with impaired activity tolerance. Patient lives in [place] but is here in [place] staying with his son. He does not have a regular exercise routine but expresses a desire to start one. Patient educated extensively about safe return to activity and self-monitoring for signs and symptoms of over-exertion.  Patient has no further acute care PT needs at this time. Safe for DC home when medically stable.     Plan    DC Equipment Recommendations: None  Discharge Recommendations: Anticipate that the patient will have no further physical therapy needs after discharge from the hospital. May benefit from cardiac rehab, however, he does not live here in [place]        Subjective    \"Oh good, please educate me\"     Objective       10/03/20 0950   Initial Contact Note    Initial Contact Note Order Received and Verified, Physical Therapy Evaluation in Progress with Full Report to Follow.   Precautions   Precautions Cardiac Precautions (See Comments)   Pain 0 - 10 Group   Pain Rating Scale (NPRS) 0   Prior Living Situation   Prior Services Home-Independent   Housing / Facility 2 Story House   Steps Into Home 0   Steps In Home 6   Equipment Owned None   Lives with - Patient's Self Care Capacity Adult Children   Comments Patient is living with his son who can provide assist as needed   Prior Level of Functional Mobility   Bed Mobility Independent   Transfer Status Independent   Ambulation Independent   Distance Ambulation (Feet)   (short community distances)   Assistive Devices Used None   Stairs Independent   Comments Patient does not have a regular exercise routine   History of Falls   History of Falls No   Cognition    Cognition / Consciousness WDL   Level of Consciousness " Alert   Comments pleasant and motivated    Passive ROM Lower Body   Passive ROM Lower Body WDL   Active ROM Lower Body    Active ROM Lower Body  WDL   Strength Lower Body   Lower Body Strength  WDL   Sensation Lower Body   Lower Extremity Sensation   WDL   Strength Upper Body   Upper Body Strength  WDL   Balance Assessment   Sitting Balance (Static) Good   Sitting Balance (Dynamic) Good   Standing Balance (Static) Fair +   Standing Balance (Dynamic) Fair +   Weight Shift Sitting Good   Weight Shift Standing Good   Gait Analysis   Gait Level Of Assist Supervised   Assistive Device None   Distance (Feet) 300   # of Times Distance was Traveled 1   Deviation Bradykinetic   # of Stairs Climbed 6   Level of Assist with Stairs Supervised   Comments Cueing for sequencing and pacing on stairs    Bed Mobility    Supine to Sit Independent   Sit to Supine Independent   Functional Mobility   Sit to Stand Independent   Bed, Chair, Wheelchair Transfer Independent   How much difficulty does the patient currently have...   Turning over in bed (including adjusting bedclothes, sheets and blankets)? 4   Sitting down on and standing up from a chair with arms (e.g., wheelchair, bedside commode, etc.) 4   Moving from lying on back to sitting on the side of the bed? 4   How much help from another person does the patient currently need...   Moving to and from a bed to a chair (including a wheelchair)? 4   Need to walk in a hospital room? 4   Climbing 3-5 steps with a railing? 4   6 clicks Mobility Score 24   Activity Tolerance   Sitting in Chair NT   Sitting Edge of Bed 10  min   Standing 10 min   Education Group   Education Provided Cardiac Precautions;Role of Physical Therapist   Cardiac Precautions Patient Response Patient;Eager;Explanation;Demonstration;Handout;Verbal Demonstration;Action Demonstration   Role of Physical Therapist Patient Response Patient;Acceptance;Explanation;Demonstration;Verbal Demonstration   Anticipated Discharge  Equipment and Recommendations   DC Equipment Recommendations None   Discharge Recommendations Anticipate that the patient will have no further physical therapy needs after discharge from the hospital       Mattie Jewell, PT, DPT, GCS

## 2020-10-03 NOTE — PROGRESS NOTES
Discharge instructions provided to pt and relative. Discussed follow up appointments, diet, medications and activity. Pt states understanding. IV was removed. Copy of discharge provided to the patient. A signed copy of discharge is in patient's chart. All personal belongings are in patients possession. All questions have been answered. Patient is being wheeled off floor.

## 2020-10-03 NOTE — DISCHARGE PLANNING
Anticipated Discharge Disposition: Home     Action: LSW covering for the weekend. No known d/c needs at this time. Pt observation status. No IMM required.     Barriers to Discharge: None      Plan: LSW to assist as needed

## 2020-10-03 NOTE — PROGRESS NOTES
CARDIAC PROGRESS NOTE    Requesting Provider: Demarcus Ramos M.D.  Reason for consultation: Chest pain    Impressions:  #. Unstable angina   -Hx CABG 2014 and multiple PCI. Angio 2/2020 LIMA patent, other grafts occluded, PCI LM with 3.25 xience, PCI Cx with 3.5 Xience, unknown status of other stents   - Historically normal LVEF  #.  Near syncope   -History of recurrent vasovagal syncope  #.  ? Diabetes      Recommendations:  Echocardiogram, continue heparin drip    Cardiac catheterization 10/2/2020   IMPRESSIONS:  1. Unstable angina due to severe stenosis of the diagonal- ISR, complex  2. Unsuccessful PCI of the Diagonal  3. Failure of ELCA atherectomy  4.  Left groin site CDI, no hematoma, minimal ecchymosis    Continue amlodipine 10 mg daily, ASA 81 mg daily, isosorbide mononitrate 60 mg every evening, metoprolol SR 50 mg daily, and rosuvastatin 40 mg every evening    Start Prasugrel 10 mg daily, discontinue clopidogrel    Consider discontinuing ranolazine after cardiac catheterization as it is likely not truly beneficial    Recommend SGLT 2 inhibitor or GLP-1 receptor antagonist and more aggressive diabetic treatment.  Order for pharmacology has been placed.      History: Gabino Mckeon is a 60 y.o. male with a past medical history of hypertension and diabetes who I have been consulted regarding chest discomfort.  For the past 3 weeks he has been experiencing on again off again chest discomfort.  It is reliably provoked with exertion and relieved with rest.  He has had several episodes also at rest.  Episodes last 10 to 15 minutes are felt in the center of the chest and relieved with nitroglycerin.  A few days ago he also felt near syncopal with prolonged standing and had to lie down.  He also experiences dyspnea on exertion.    He reports coronary artery disease history dating back to 2014.  At that time he had multivessel coronary artery disease and underwent 5 vessel bypass.  He was told that he could even  "have used 7 bypasses but there was not enough room.  Subsequently he had failure of the bypasses except the LIMA and is undergone several PCI procedures, most recently February 2020.  He experienced similar chest symptoms before the revascularization procedures.    He also reports a history of syncope and near syncope over the past several years.  Several months ago he fell down and injured his back    He is in the process of relocating from [facility] and dividing his time between the locations.  He and his wife recently moved to this area to be close to his son who recently started internal medicine residency at [employer].    ROS:  All other systems reviewed and negative except as per the HPI    PE:  /69   Pulse 86   Temp 37.1 °C (98.8 °F) (Temporal)   Resp 15   Ht 1.626 m (5' 4\")   Wt 70.9 kg (156 lb 4.9 oz)   SpO2 100%   BMI 26.83 kg/m²   GEN: Well appearing  HEENT: Symmetric face. Anicteric sclerae. Moist mucus membranes  NECK: No JVD. No lymphadenopathy  CARDIAC: Normal PMI, regular, normal S1, S2, left femoral site CDI, no hematoma, minimal ecchymosis  VASCULATURE: Normal carotid amplitude without bruit. Peripheral pulses normal  RESP: Clear to auscultation bilaterally  ABD: Soft, non-tender, non-distended  EXT: No edema, no clubbing or cyanosis  SKIN: Warm and dry  NEURO: No gross deficits  PSYCH: Appropriate affect, participates in conversation      Past Medical History:   Diagnosis Date   • Anginal syndrome (HCC)    • At risk for sleep apnea    • Back pain    • Diabetes (HCC)    • Fall    • H/O heart artery stent     multiple   • Hypertension      Past Surgical History:   Procedure Laterality Date   • OTHER ABDOMINAL SURGERY     • OTHER CARDIAC SURGERY     • OTHER ORTHOPEDIC SURGERY       No Known Allergies      Current Facility-Administered Medications:   •  metoprolol SR (TOPROL XL) tablet 100 mg, 100 mg, Oral, DAILY, Augusto Good M.D., 100 mg at 10/03/20 0434  •  aspirin EC (ECOTRIN) " tablet 81 mg, 81 mg, Oral, DAILY, Augusto Good M.D.  •  prasugrel (EFFIENT) tablet 10 mg, 10 mg, Oral, DAILY, Augusto Good M.D., 10 mg at 10/03/20 0434  •  insulin regular (HumuLIN R,NovoLIN R) injection, 2-9 Units, Subcutaneous, 4X/DAY ACHS, Stopped at 10/03/20 0700 **AND** POC Blood Glucose, , , Q AC AND BEDTIME(S) **AND** NOTIFY MD and PharmD, , , Once **AND** glucose 4 g chewable tablet 16 g, 16 g, Oral, Q15 MIN PRN **AND** dextrose 50% (D50W) injection 50 mL, 50 mL, Intravenous, Q15 MIN PRN, Terry Palmer M.D.  •  senna-docusate (PERICOLACE or SENOKOT S) 8.6-50 MG per tablet 2 Tab, 2 Tab, Oral, BID **AND** polyethylene glycol/lytes (MIRALAX) PACKET 1 Packet, 1 Packet, Oral, QDAY PRN **AND** magnesium hydroxide (MILK OF MAGNESIA) suspension 30 mL, 30 mL, Oral, QDAY PRN **AND** bisacodyl (DULCOLAX) suppository 10 mg, 10 mg, Rectal, QDAY PRN, Jere Leach D.O.  •  acetaminophen (TYLENOL) tablet 650 mg, 650 mg, Oral, Q6HRS PRN, JAYCEE LopezO.  •  Notify provider if pain remains uncontrolled, , , CONTINUOUS **AND** Use the numeric rating scale (NRS-11) on regular floors and Critical-Care Pain Observation Tool (CPOT) on ICUs/Trauma to assess pain, , , CONTINUOUS **AND** Pulse Ox (Oximetry), , , CONTINUOUS **AND** Pharmacy Consult Request ...Pain Management Review 1 Each, 1 Each, Other, PHARMACY TO DOSE **AND** If patient difficult to arouse and/or has respiratory depression, stop any opiates that are currently infusing and call a Rapid Response., , , CONTINUOUS **AND** [DISCONTINUED] oxyCODONE immediate-release (ROXICODONE) tablet 2.5 mg, 2.5 mg, Oral, Q3HRS PRN **AND** [DISCONTINUED] oxyCODONE immediate-release (ROXICODONE) tablet 5 mg, 5 mg, Oral, Q3HRS PRN **AND** [DISCONTINUED] morphine (pf) 4 mg/mL injection 2 mg, 2 mg, Intravenous, Q3HRS PRN, Jere Leach D.O.  •  enalaprilat (VASOTEC) injection 1.25 mg, 1.25 mg, Intravenous, Q6HRS PRN, JAYCEE LopezO.  •  ondansetron  (ZOFRAN) syringe/vial injection 4 mg, 4 mg, Intravenous, Q4HRS PRN, ROBERT Lopez.O.  •  ondansetron (ZOFRAN ODT) dispertab 4 mg, 4 mg, Oral, Q4HRS PRN, ROBERT Lopez.O.  •  promethazine (PHENERGAN) tablet 12.5-25 mg, 12.5-25 mg, Oral, Q4HRS PRN, ROBERT Lopez.O.  •  promethazine (PHENERGAN) suppository 12.5-25 mg, 12.5-25 mg, Rectal, Q4HRS PRN, ROBERT Lopez.O.  •  prochlorperazine (COMPAZINE) injection 5-10 mg, 5-10 mg, Intravenous, Q4HRS PRN, ROBERT Lopez.O.  •  isosorbide mononitrate SR (IMDUR) tablet 60 mg, 60 mg, Oral, Q EVENING, ROBERT Lopez.ONaila, 60 mg at 10/02/20 1811  •  rosuvastatin (CRESTOR) tablet 40 mg, 40 mg, Oral, Q EVENING, ROBERT Lopez.O., 40 mg at 10/02/20 1811  •  amLODIPine (NORVASC) tablet 10 mg, 10 mg, Oral, DAILY, ROBERT Lopez.O., Stopped at 10/02/20 0900  •  omeprazole (PRILOSEC) capsule 20 mg, 20 mg, Oral, DAILY, ROBERT Lopez.O., 20 mg at 10/03/20 0434  •  tramadol (ULTRAM) 50 MG tablet 50 mg, 50 mg, Oral, TID PRN, Milo Izquierdo M.D., 50 mg at 10/03/20 0435  Medications Prior to Admission   Medication Sig Dispense Refill Last Dose   • aspirin EC (ECOTRIN) 81 MG Tablet Delayed Response Take 81 mg by mouth every day. Took 2 tabs 10/1/2020   10/1/2020 at 0200   • MAGNESIUM PO Take 1 Tab by mouth every day.   9/29/2020 at 0930   • HYDROcodone-acetaminophen (NORCO) 5-325 MG Tab per tablet Take 1 Tab by mouth 1 time daily as needed.   9/27/2020 at Unknown time   • amLODIPine (NORVASC) 10 MG Tab Take 10 mg by mouth every day.   9/30/2020 at 0930   • ALPRAZolam (XANAX) 0.25 MG Tab Take 1 mg by mouth 1 time daily as needed.   9/29/2020 at am   • isosorbide mononitrate SR (IMDUR) 60 MG TABLET SR 24 HR Take 60 mg by mouth every day.   9/30/2020 at 2100   • metFORMIN ER (GLUCOPHAGE XR) 500 MG TABLET SR 24 HR Take 500 mg by mouth 3 times a day.   9/30/2020 at 2100   • omeprazole (PRILOSEC) 40 MG delayed-release capsule Take 40 mg by mouth 2 times a day.    2020 at 2100   • nitroglycerin (NITROSTAT) 0.4 MG SL Tab Place 0.4 mg under tongue as needed.   2020 at 2100   • rosuvastatin (CRESTOR) 40 MG tablet Take 40 mg by mouth every evening.   2020 at 2100   • tramadol (ULTRAM) 50 MG Tab Take 50 mg by mouth 3 times a day as needed.   10/1/2020 at 0500   • [DISCONTINUED] clopidogrel (PLAVIX) 75 MG Tab Take 75 mg by mouth every day.   2020 at 0930   • [DISCONTINUED] metoprolol SR (TOPROL XL) 50 MG TABLET SR 24 HR Take 50 mg by mouth every day.   2020 at 0930   • [DISCONTINUED] Ranolazine 1000 MG TABLET SR 12 HR Take 1,000 mg by mouth 2 Times a Day.   2020 at 0930      Social History     Socioeconomic History   • Marital status:      Spouse name: Not on file   • Number of children: Not on file   • Years of education: Not on file   • Highest education level: Not on file   Occupational History   • Not on file   Social Needs   • Financial resource strain: Not on file   • Food insecurity     Worry: Patient refused     Inability: Patient refused   • Transportation needs     Medical: Patient refused     Non-medical: Patient refused   Tobacco Use   • Smoking status: Former Smoker     Quit date: 10/1/2000     Years since quittin.0   • Smokeless tobacco: Never Used   Substance and Sexual Activity   • Alcohol use: Never     Frequency: Never     Binge frequency: Never   • Drug use: Never   • Sexual activity: Not on file   Lifestyle   • Physical activity     Days per week: Not on file     Minutes per session: Not on file   • Stress: Not on file   Relationships   • Social connections     Talks on phone: Not on file     Gets together: Not on file     Attends Baptism service: Not on file     Active member of club or organization: Not on file     Attends meetings of clubs or organizations: Not on file     Relationship status: Not on file   • Intimate partner violence     Fear of current or ex partner: Not on file     Emotionally abused: Not on file      Physically abused: Not on file     Forced sexual activity: Not on file   Other Topics Concern   • Not on file   Social History Narrative   • Not on file       Family History   Problem Relation Age of Onset   • Heart Disease Mother    • Hypertension Mother    • Heart Disease Father    • Hypertension Father    • Heart Disease Brother           Studies  No results found for: CHOLSTRLTOT, LDL, HDL, TRIGLYCERIDE    Lab Results   Component Value Date/Time    SODIUM 134 (L) 10/03/2020 03:36 AM    POTASSIUM 4.5 10/03/2020 03:36 AM    CHLORIDE 105 10/03/2020 03:36 AM    CO2 19 (L) 10/03/2020 03:36 AM    GLUCOSE 165 (H) 10/03/2020 03:36 AM    BUN 12 10/03/2020 03:36 AM    CREATININE 0.87 10/03/2020 03:36 AM     Lab Results   Component Value Date/Time    ALKPHOSPHAT 69 10/03/2020 03:36 AM    ASTSGOT 7 (L) 10/03/2020 03:36 AM    ALTSGPT 9 10/03/2020 03:36 AM    TBILIRUBIN 0.4 10/03/2020 03:36 AM      No results found for: BNPBTYPENAT    For this encounter I directly reviewed ECG tracings and medical records        MICHELE Kidd  [facility]  [phone]    Future Appointments   Date Time Provider Department Center   10/15/2020 10:45 AM Geena Gottlieb P.A.-C. [facility] None           Please note that this dictation was created using voice recognition software. I have worked with consultants from the vendor as well as technical experts from [facility] to optimize the interface. I have made every reasonable attempt to correct obvious errors, but I expect that there are errors of grammar and possibly content I did not discover before finalizing the note.

## 2020-10-03 NOTE — PROGRESS NOTES
Pt arrived to floor post cath. One site right groin along with one balloon stent placed. Site is dry and intact without bruising or hematoma. Pt will be on bed rest until 2030. Will monitor closely.

## 2020-10-03 NOTE — CARE PLAN
Problem: Communication  Goal: The ability to communicate needs accurately and effectively will improve  Outcome: PROGRESSING AS EXPECTED  Intervention: Callands patient and significant other/support system to call light to alert staff of needs  Flowsheets (Taken 10/2/2020 2234)  Oriented to:: All of the Following : Location of Bathroom, Visiting Policy, Unit Routine, Call Light and Bedside Controls, Bedside Rail Policy, Smoking Policy, Rights and Responsibilities, Bedside Report, and Patient Education Notebook  Note: Pt updated on plan of care. Patient expresses understanding. All questions answered. Call light within reach and educated on how to use it.      Problem: Infection  Goal: Will remain free from infection  Outcome: PROGRESSING AS EXPECTED  Intervention: Assess signs and symptoms of infection  Note: Pt verbalized understanding of the importance of hand hygiene. Pt assessed for signs and symptoms of infection.

## 2020-10-03 NOTE — PROGRESS NOTES
Filling out pink sheet. Spoke with patient and he stated he took 324mg of ASA before arriving to hospital.

## 2020-10-24 ENCOUNTER — PATIENT MESSAGE (OUTPATIENT)
Dept: CARDIOLOGY | Facility: MEDICAL CENTER | Age: 60
End: 2020-10-24

## 2020-12-10 ENCOUNTER — NURSE TRIAGE (OUTPATIENT)
Dept: HEALTH INFORMATION MANAGEMENT | Facility: OTHER | Age: 60
End: 2020-12-10

## 2020-12-10 ENCOUNTER — OFFICE VISIT (OUTPATIENT)
Dept: URGENT CARE | Facility: CLINIC | Age: 60
End: 2020-12-10
Payer: MEDICARE

## 2020-12-10 VITALS
TEMPERATURE: 98.1 F | DIASTOLIC BLOOD PRESSURE: 90 MMHG | RESPIRATION RATE: 18 BRPM | BODY MASS INDEX: 29.3 KG/M2 | WEIGHT: 159.2 LBS | HEIGHT: 62 IN | SYSTOLIC BLOOD PRESSURE: 136 MMHG | HEART RATE: 98 BPM | OXYGEN SATURATION: 97 %

## 2020-12-10 DIAGNOSIS — M54.6 CHRONIC THORACIC BACK PAIN, UNSPECIFIED BACK PAIN LATERALITY: ICD-10-CM

## 2020-12-10 DIAGNOSIS — G89.29 CHRONIC THORACIC BACK PAIN, UNSPECIFIED BACK PAIN LATERALITY: ICD-10-CM

## 2020-12-10 PROCEDURE — 99204 OFFICE O/P NEW MOD 45 MIN: CPT | Performed by: PHYSICIAN ASSISTANT

## 2020-12-10 RX ORDER — PREDNISONE 20 MG/1
TABLET ORAL
Qty: 15 TAB | Refills: 0 | Status: ON HOLD | OUTPATIENT
Start: 2020-12-10 | End: 2021-02-18

## 2020-12-10 RX ORDER — HYDROCODONE BITARTRATE AND ACETAMINOPHEN 5; 325 MG/1; MG/1
1 TABLET ORAL EVERY 6 HOURS PRN
Qty: 10 TAB | Refills: 0 | Status: SHIPPED | OUTPATIENT
Start: 2020-12-10 | End: 2020-12-13

## 2020-12-10 ASSESSMENT — ENCOUNTER SYMPTOMS
WEAKNESS: 0
BACK PAIN: 1
PARESTHESIAS: 1
ROS GI COMMENTS: NO FECAL INCONTINENCE
SHORTNESS OF BREATH: 0
SENSORY CHANGE: 0
NECK PAIN: 1
FOCAL WEAKNESS: 0

## 2020-12-10 ASSESSMENT — FIBROSIS 4 INDEX: FIB4 SCORE: 0.53

## 2020-12-10 NOTE — TELEPHONE ENCOUNTER
Just moved from [place], cant get out of bed for shoulder/neck pain, hx of fusion of t6. Pins and needles to the left fingers, and weakness, but sensation felt.  Pt transferred to  for appt.    Reason for Disposition  • Numbness in an arm or hand (i.e., loss of sensation)    Additional Information  • Negative: Shock suspected (e.g., cold/pale/clammy skin, too weak to stand, low BP, rapid pulse)  • Negative: Similar pain previously and it was from 'heart attack'  • Negative: Similar pain previously from 'angina' and not relieved by nitroglycerin  • Negative: Difficult to awaken or acting confused (e.g., disoriented, slurred speech)  • Negative: Sounds like a life-threatening emergency to the triager  • Negative: Followed an injury to neck (e.g., MVA, sports, impact or collision)  • Negative: Chest pain  • Negative: Lymph node in the neck is swollen or painful to the touch  • Negative: Sore throat is the main symptom  • Negative: SEVERE pain (e.g., excruciating, unable to do any normal activities)  • Negative: Head is twisting to one side (or ask 'is it turning against your will?')  • Negative: Fever > 103 F (39.4 C)  • Negative: Fever > 100.0 F (37.8 C) and IVDA (intravenous drug abuse)  • Negative: Fever > 100.0 F (37.8 C) and has diabetes mellitus or a weak immune system (e.g., HIV positive, cancer chemotherapy, organ transplant, splenectomy, chronic steroids)  • Negative: Weakness of an arm or hand  • Negative: Difficulty breathing or unusual sweating (e.g., sweating without exertion)  • Negative: Chest pain lasting longer than 5 minutes  • Negative: Stiff neck (can't touch chin to chest) and has headache  • Negative: Stiff neck (can't touch chin to chest) and fever  • Negative: Problems with bowel or bladder control  • Negative: Patient sounds very sick or weak to the triager  • Negative: Painful rash with multiple small blisters grouped together (i.e., dermatomal distribution or 'band' or 'stripe')  •  "Negative: High-risk adult (e.g., history of cancer, HIV, or IV drug abuse)  • Negative: Patient wants to be seen  • Negative: Tenderness in front of neck over windpipe  • Negative: MODERATE neck pain (e.g., interferes with normal activities like work or school)  • Negative: Pain shoots (radiates) into arm or hand  • Negative: Neck pain persists > 2 weeks  • Negative: Neck pain is a chronic symptom (recurrent or ongoing AND lasting > 4 weeks)  • Negative: Age > 50 and no prior history of similar neck pain  • Negative: Neck pain or stiffness  • Negative: Caused by twisting, bending, or lifting injury    Answer Assessment - Initial Assessment Questions  1. ONSET: \"When did the pain begin?\"       Chronic- had a fusion of c5/c6 1996 started on 12/5  2. LOCATION: \"Where does it hurt?\"       Neck and left scapula  3. PATTERN \"Does the pain come and go, or has it been constant since it started?\"       constant  4. SEVERITY: \"How bad is the pain?\"  (Scale 1-10; or mild, moderate, severe)    - MILD (1-3): doesn't interfere with normal activities     - MODERATE (4-7): interferes with normal activities or awakens from sleep     - SEVERE (8-10):  excruciating pain, unable to do any normal activities       10  5. RADIATION: \"Does the pain go anywhere else, shoot into your arms?\"      Shoots down to the fingertips on the left side  6. CORD SYMPTOMS: \"Any weakness or numbness of the arms or legs?\"      Yes, to the left arm  7. CAUSE: \"What do you think is causing the neck pain?\"      I had a fusion in 1996, and was told i'd need to have surgery again  8. NECK OVERUSE: \"Any recent activities that involved turning or twisting the neck?\"     Was moving boxes in the garage a few days prior  9. OTHER SYMPTOMS: \"Do you have any other symptoms?\" (e.g., headache, fever, chest pain, difficulty breathing, neck swelling)      no  10. PREGNANCY: \"Is there any chance you are pregnant?\" \"When was your last menstrual period?\"        " no    Protocols used: NECK PAIN OR [id number]    1. Caller Name: Gabino Mckeon                 Call Back Number: [phone]  [facility] PCP or Specialty Provider: No  No provider        2.  Has the patient previously tested positive for COVID-19? No    3.  In the last two weeks, has the patient had any new or worsening symptoms (not explained by alternative diagnosis)? No.    4.  Does patient have any comoribidities? None     5.  Has the patient had any known contact with someone who is suspected or confirmed to have COVID-19? No.    5. Disposition: Cleared by RN Triage as potential is low for COVID-19; OK to keep/schedule appointment    Note routed to [facility] Provider: DIETER only.

## 2020-12-11 ENCOUNTER — TELEPHONE (OUTPATIENT)
Dept: SCHEDULING | Facility: IMAGING CENTER | Age: 60
End: 2020-12-11

## 2020-12-11 NOTE — PROGRESS NOTES
Subjective:   Gabino Mckeon is a 60 y.o. male who presents for Back Pain (x since saturday having bad back pain in shoulders and arms )      Back Pain  This is a chronic problem. The current episode started more than 1 year ago. The problem occurs constantly. The problem has been rapidly worsening since onset. The pain is present in the thoracic spine. The pain is severe. Associated symptoms include paresthesias. Pertinent negatives include no chest pain or weakness. He has tried NSAIDs, home exercises and analgesics for the symptoms. The treatment provided no relief.       Review of Systems   Respiratory: Negative for shortness of breath.    Cardiovascular: Negative for chest pain.   Gastrointestinal:        No fecal incontinence   Genitourinary:        No urinary retention/incontinance.   Musculoskeletal: Positive for back pain and neck pain.   Neurological: Positive for paresthesias. Negative for sensory change, focal weakness and weakness.        Able to walk       Medications:    • ALPRAZolam Tabs  • amLODIPine Tabs  • aspirin EC Tbec  • Canagliflozin Tabs  • HYDROcodone-acetaminophen Tabs  • isosorbide mononitrate SR Tb24  • MAGNESIUM PO  • metFORMIN ER Tb24  • metoprolol SR Tb24  • nitroglycerin Subl  • omeprazole  • prasugrel Tabs  • predniSONE Tabs  • rosuvastatin    Allergies: Patient has no known allergies.    Problem List: Gabino Mckeon has Unstable angina (HCC); Postural dizziness with presyncope; High anion gap metabolic acidosis; GERD (gastroesophageal reflux disease); and Type 2 diabetes mellitus, without long-term current use of insulin (HCC) on their problem list.    Surgical History:  Past Surgical History:   Procedure Laterality Date   • OTHER ABDOMINAL SURGERY     • OTHER CARDIAC SURGERY     • OTHER ORTHOPEDIC SURGERY         Past Social Hx: Gabino Mckeon  reports that he quit smoking about 20 years ago. He has never used smokeless tobacco. He reports that he does not drink alcohol or use drugs.  "    Past Family Hx:  Gabino Mckeon family history includes Heart Disease in his brother, father, and mother; Hypertension in his father and mother.     Problem list, medications, and allergies reviewed by myself today in Epic.     Objective:     Blood Pressure 136/90 (BP Location: Left arm, Patient Position: Sitting, BP Cuff Size: Adult)   Pulse 98   Temperature 36.7 °C (98.1 °F) (Temporal)   Respiration 18   Height 1.575 m (5' 2\")   Weight 72.2 kg (159 lb 3.2 oz)   Oxygen Saturation 97%   Body Mass Index 29.12 kg/m²     Physical Exam  Vitals signs reviewed.   Constitutional:       General: He is not in acute distress.     Appearance: Normal appearance. He is not ill-appearing, toxic-appearing or diaphoretic.   HENT:      Head: Normocephalic and atraumatic.   Neck:      Musculoskeletal: Normal range of motion. Muscular tenderness present. No neck rigidity.      Vascular: No carotid bruit.      Comments: PTP of the lower neck and upper thoracic region.  No focal midline tenderness of the cervical spine.  No step-offs appreciated.  Flexion, extension, lateral bend and rotation is limited due to pain.  Cardiovascular:      Rate and Rhythm: Normal rate.      Heart sounds: Normal heart sounds.   Pulmonary:      Effort: Pulmonary effort is normal.   Musculoskeletal: Normal range of motion.   Lymphadenopathy:      Cervical: No cervical adenopathy.   Skin:     General: Skin is warm.      Findings: No rash.   Neurological:      General: No focal deficit present.      Mental Status: He is alert and oriented to person, place, and time. Mental status is at baseline.      Cranial Nerves: No cranial nerve deficit.      Sensory: No sensory deficit.      Motor: No weakness.      Coordination: Coordination normal.      Gait: Gait normal.      Deep Tendon Reflexes: Reflexes normal.      Comments: Motor and sensory of C-Spine    Deltoid/biceps brachii(C5): Intact  Radial wrist extensor(C6): Intact  Triceps brachii/flexor carpi " radialis(C7): Intact  Flexor Digitorum Superficialis (C8): Intact    Special Test    Brudzinski-Kernig: N/A  Spurling: N/A   Psychiatric:         Mood and Affect: Mood normal.         Behavior: Behavior normal.         Thought Content: Thought content normal.         Judgment: Judgment normal.         Assessment/Plan:     Medical Decision Making/Comments     Patient is a 60-year-old male who presents for evaluation of chronic back pain.  He has a well-documented history of chronic back issues and fusions.  He states that the last week he has had a flareup of severe pain with radiation to his left arm causing numbness tingling.  Currently he is in the process of moving to [place] permanently and does not have established care.  No red flags were seen on exam.  We will refer him to pain clinic.  Last prescription of tramadol was filled in August of this year.   Diagnosis and associated orders     1. Chronic thoracic back pain, unspecified back pain laterality  REFERRAL TO PAIN CLINIC    predniSONE (DELTASONE) 20 MG Tab    HYDROcodone-acetaminophen (NORCO) 5-325 MG Tab per tablet              Differential diagnosis, natural history, supportive care, and indications for immediate follow-up discussed.    Advised the patient to follow-up with the primary care physician for recheck, reevaluation, and consideration of further management.    Please note that this dictation was created using voice recognition software. I have made a reasonable attempt to correct obvious errors, but I expect that there are errors of grammar and possibly content that I did not discover before finalizing the note.

## 2020-12-21 ENCOUNTER — PATIENT MESSAGE (OUTPATIENT)
Dept: MEDICAL GROUP | Facility: PHYSICIAN GROUP | Age: 60
End: 2020-12-21

## 2020-12-21 ENCOUNTER — OFFICE VISIT (OUTPATIENT)
Dept: MEDICAL GROUP | Facility: PHYSICIAN GROUP | Age: 60
End: 2020-12-21
Payer: MEDICARE

## 2020-12-21 VITALS
DIASTOLIC BLOOD PRESSURE: 70 MMHG | HEART RATE: 110 BPM | WEIGHT: 157 LBS | SYSTOLIC BLOOD PRESSURE: 126 MMHG | HEIGHT: 62 IN | TEMPERATURE: 96.8 F | BODY MASS INDEX: 28.89 KG/M2 | OXYGEN SATURATION: 98 %

## 2020-12-21 DIAGNOSIS — E11.9 CONTROLLED TYPE 2 DIABETES MELLITUS WITHOUT COMPLICATION, WITHOUT LONG-TERM CURRENT USE OF INSULIN (HCC): ICD-10-CM

## 2020-12-21 DIAGNOSIS — M54.12 CERVICAL RADICULOPATHY: ICD-10-CM

## 2020-12-21 DIAGNOSIS — I10 BENIGN ESSENTIAL HTN: ICD-10-CM

## 2020-12-21 PROCEDURE — 99214 OFFICE O/P EST MOD 30 MIN: CPT | Performed by: FAMILY MEDICINE

## 2020-12-21 RX ORDER — TRAMADOL HYDROCHLORIDE 50 MG/1
50 TABLET ORAL EVERY 4 HOURS PRN
COMMUNITY
End: 2021-02-22 | Stop reason: SDUPTHER

## 2020-12-21 RX ORDER — RANOLAZINE 500 MG/1
TABLET, EXTENDED RELEASE ORAL
Status: ON HOLD | COMMUNITY
Start: 2020-12-13 | End: 2021-02-17

## 2020-12-21 RX ORDER — METOPROLOL SUCCINATE 50 MG/1
TABLET, EXTENDED RELEASE ORAL
COMMUNITY
Start: 2020-12-06 | End: 2021-02-12 | Stop reason: CLARIF

## 2020-12-21 ASSESSMENT — ENCOUNTER SYMPTOMS
DEPRESSION: 0
PSYCHIATRIC NEGATIVE: 1
CHILLS: 0
CONSTITUTIONAL NEGATIVE: 1
HEARTBURN: 0
TINGLING: 0
COUGH: 0
NECK PAIN: 1
CARDIOVASCULAR NEGATIVE: 1
NAUSEA: 0
DIZZINESS: 0
HEADACHES: 0
EYES NEGATIVE: 1
DOUBLE VISION: 0
RESPIRATORY NEGATIVE: 1
GASTROINTESTINAL NEGATIVE: 1
HEMOPTYSIS: 0
BLURRED VISION: 0
FEVER: 0
PALPITATIONS: 0
BRUISES/BLEEDS EASILY: 0
MYALGIAS: 0
NEUROLOGICAL NEGATIVE: 1

## 2020-12-21 ASSESSMENT — PATIENT HEALTH QUESTIONNAIRE - PHQ9
5. POOR APPETITE OR OVEREATING: 2 - MORE THAN HALF THE DAYS
CLINICAL INTERPRETATION OF PHQ2 SCORE: 4
SUM OF ALL RESPONSES TO PHQ QUESTIONS 1-9: 10

## 2020-12-21 ASSESSMENT — FIBROSIS 4 INDEX: FIB4 SCORE: 0.53

## 2020-12-21 NOTE — PROGRESS NOTES
Subjective:      Gabino Mckeon is a 60 y.o. male who presents with Establish Care (Establish care. Spinal fusion.)            1. Cervical radiculopathy  Just moved here from [place]  Had a c5-c6 fusion last year.   Last month woke with sudden pain in the neck and numbness and tingling in the left arm which has persisted  Will continue with meds and send to surgery  - REFERRAL TO SPINE SURGERY    2. Controlled type 2 diabetes mellitus without complication, without long-term current use of insulin (HCC  Currently treated for DM, taking meds and checking bs at home, trying to do DM diet.controlled        3. Benign essential HTN  Currently treated for HTN, taking meds with no CP or sob, monitors bp at home periodically. controlled      Past Medical History:  No date: Anginal syndrome (HCC)  No date: At risk for sleep apnea  No date: Back pain  No date: Diabetes (HCC)  No date: Fall  No date: H/O heart artery stent      Comment:  multiple  No date: Hypertension  Past Surgical History:  No date: OTHER ABDOMINAL SURGERY  No date: OTHER CARDIAC SURGERY  No date: OTHER ORTHOPEDIC SURGERY  Social History    Tobacco Use      Smoking status: Former Smoker        Quit date: 10/1/2000        Years since quittin.2      Smokeless tobacco: Never Used    Alcohol use: Never      Frequency: Never      Binge frequency: Never    Drug use: Never    Review of patient's family history indicates:  Problem: Heart Disease      Relation: Mother          Age of Onset: (Not Specified)  Problem: Hypertension      Relation: Mother          Age of Onset: (Not Specified)  Problem: Heart Disease      Relation: Father          Age of Onset: (Not Specified)  Problem: Hypertension      Relation: Father          Age of Onset: (Not Specified)  Problem: Heart Disease      Relation: Brother          Age of Onset: (Not Specified)      Current Outpatient Medications: •  ranolazine (RANEXA) 500 MG TABLET SR 12 HR, , Disp: , Rfl: •  traMADol (ULTRAM) 50 MG  Tab, Take 50 mg by mouth every four hours as needed., Disp: , Rfl: •  predniSONE (DELTASONE) 20 MG Tab, Take 60 mg (3 Tablets) at once every day for 5 days., Disp: 15 Tab, Rfl: 0•  metoprolol SR (TOPROL XL) 100 MG TABLET SR 24 HR, Take 1 Tab by mouth every day., Disp: 30 Tab, Rfl: 1•  prasugrel (EFFIENT) 10 MG Tab, Take 1 Tab by mouth every day., Disp: 30 Tab, Rfl: 1•  Canagliflozin 100 MG Tab, Take 100 mg by mouth every day., Disp: 30 Tab, Rfl: 1•  ALPRAZolam (XANAX) 0.25 MG Tab, Take 1 mg by mouth 1 time daily as needed., Disp: , Rfl: •  isosorbide mononitrate SR (IMDUR) 60 MG TABLET SR 24 HR, Take 60 mg by mouth every day., Disp: , Rfl: •  metFORMIN ER (GLUCOPHAGE XR) 500 MG TABLET SR 24 HR, Take 500 mg by mouth 3 times a day., Disp: , Rfl: •  omeprazole (PRILOSEC) 40 MG delayed-release capsule, Take 40 mg by mouth 2 times a day., Disp: , Rfl: •  nitroglycerin (NITROSTAT) 0.4 MG SL Tab, Place 0.4 mg under tongue as needed., Disp: , Rfl: •  rosuvastatin (CRESTOR) 40 MG tablet, Take 40 mg by mouth every evening., Disp: , Rfl: •  aspirin EC (ECOTRIN) 81 MG Tablet Delayed Response, Take 81 mg by mouth every day. Took 2 tabs 10/1/2020, Disp: , Rfl: •  MAGNESIUM PO, Take 1 Tab by mouth every day., Disp: , Rfl: •  amLODIPine (NORVASC) 10 MG Tab, Take 10 mg by mouth every day., Disp: , Rfl: •  metoprolol SR (TOPROL XL) 50 MG TABLET SR 24 HR, , Disp: , Rfl:     Patient was instructed on the use of medications, either prescriptions or OTC and informed on when the appropriate follow up time period should be. In addition, patient was also instructed that should any acute worsening occur that they should notify this clinic asap or call 911.          Review of Systems   Constitutional: Negative.  Negative for chills and fever.   HENT: Negative.  Negative for hearing loss.    Eyes: Negative.  Negative for blurred vision and double vision.   Respiratory: Negative.  Negative for cough and hemoptysis.    Cardiovascular: Negative.   "Negative for chest pain and palpitations.   Gastrointestinal: Negative.  Negative for heartburn and nausea.   Genitourinary: Negative.  Negative for dysuria.   Musculoskeletal: Positive for neck pain. Negative for myalgias.   Skin: Negative.  Negative for rash.   Neurological: Negative.  Negative for dizziness, tingling and headaches.   Endo/Heme/Allergies: Negative.  Does not bruise/bleed easily.   Psychiatric/Behavioral: Negative.  Negative for depression and suicidal ideas.   All other systems reviewed and are negative.         Objective:     /70   Pulse (!) 110   Temp 36 °C (96.8 °F)   Ht 1.575 m (5' 2\")   Wt 71.2 kg (157 lb)   SpO2 98%   BMI 28.72 kg/m²      Physical Exam  Vitals signs and nursing note reviewed.   Constitutional:       General: He is not in acute distress.     Appearance: He is well-developed. He is not diaphoretic.   HENT:      Head: Normocephalic and atraumatic.      Mouth/Throat:      Pharynx: No oropharyngeal exudate.   Eyes:      Pupils: Pupils are equal, round, and reactive to light.   Cardiovascular:      Rate and Rhythm: Normal rate and regular rhythm.      Heart sounds: Normal heart sounds. No murmur. No friction rub. No gallop.    Pulmonary:      Effort: Pulmonary effort is normal. No respiratory distress.      Breath sounds: Normal breath sounds. No wheezing or rales.   Chest:      Chest wall: No tenderness.   Musculoskeletal:        Arms:       Comments: Numbness to pin prick in noted area   Neurological:      Mental Status: He is alert and oriented to person, place, and time.   Psychiatric:         Behavior: Behavior normal.         Thought Content: Thought content normal.         Judgment: Judgment normal.                 Assessment/Plan:        1. Cervical radiculopathy    - REFERRAL TO SPINE SURGERY    2. Controlled type 2 diabetes mellitus without complication, without long-term current use of insulin (HCC)      3. Benign essential HTN      "

## 2020-12-23 RX ORDER — TRAMADOL HYDROCHLORIDE 50 MG/1
50 TABLET ORAL EVERY 4 HOURS PRN
Qty: 90 TAB | Refills: 0 | Status: SHIPPED | OUTPATIENT
Start: 2020-12-23 | End: 2021-02-22 | Stop reason: SDUPTHER

## 2020-12-23 NOTE — TELEPHONE ENCOUNTER
From: Gabino Mckeon  To: Oleg Lara M.D.  Sent: 12/21/2020 10:02 PM PST  Subject: Procedure Question    Thanks for seeing today. Asked MA that in 4 days please send my Ultram 50mg for refill to [facility] by fax as they are my mail in pharmacy. It will take them 10 days to get me the medicine.   Left pharmacy information with MA.  I take Ultram TID.   Also please need to know if Neurosurgeon will call me or I have to call him to see him. The paper I received does not say anything.   Dr. Gabino Mckeon( patient).   Have great Hollidays with your family.   12/21/20

## 2020-12-23 NOTE — PATIENT COMMUNICATION
Patient is requesting that the tramadol goes to [facility] please and thank you   Private car

## 2020-12-29 ENCOUNTER — PATIENT MESSAGE (OUTPATIENT)
Dept: MEDICAL GROUP | Facility: PHYSICIAN GROUP | Age: 60
End: 2020-12-29

## 2020-12-29 DIAGNOSIS — M54.12 CERVICAL RADICULOPATHY: ICD-10-CM

## 2020-12-29 RX ORDER — TRAMADOL HYDROCHLORIDE 50 MG/1
50 TABLET ORAL EVERY 4 HOURS PRN
Qty: 90 TAB | Refills: 0 | Status: ON HOLD | OUTPATIENT
Start: 2020-12-29 | End: 2021-02-22

## 2020-12-29 NOTE — TELEPHONE ENCOUNTER
From: aGbino Mckeon  To: Oleg Lara M.D.  Sent: 12/29/2020 2:49 PM PST  Subject: Prescription Question    Please call [facility] pharmacy on [address] for my Tramadol 50mg TID refill. Mail in pharmacy will take 10 days and I am running out of medications. Thanks.  I called [facility] and pharmacy said they will try to get my refill of Ultram by calling you.  I contacted Neurosurgeon for appointment.   Dr. Mckeon   GOD bless you for seeying me.

## 2021-01-06 ENCOUNTER — HOSPITAL ENCOUNTER (OUTPATIENT)
Dept: RADIOLOGY | Facility: MEDICAL CENTER | Age: 61
End: 2021-01-06
Attending: NEUROLOGICAL SURGERY
Payer: MEDICARE

## 2021-01-06 DIAGNOSIS — M54.2 CERVICALGIA: ICD-10-CM

## 2021-01-06 PROCEDURE — 72050 X-RAY EXAM NECK SPINE 4/5VWS: CPT

## 2021-01-06 PROCEDURE — 72141 MRI NECK SPINE W/O DYE: CPT

## 2021-01-22 RX ORDER — CANAGLIFLOZIN 100 MG/1
100 TABLET, FILM COATED ORAL DAILY
Qty: 100 TAB | Refills: 3 | Status: SHIPPED | OUTPATIENT
Start: 2021-01-22 | End: 2021-05-24 | Stop reason: SDUPTHER

## 2021-01-26 ENCOUNTER — OFFICE VISIT (OUTPATIENT)
Dept: CARDIOLOGY | Facility: MEDICAL CENTER | Age: 61
End: 2021-01-26
Payer: MEDICARE

## 2021-01-26 VITALS
OXYGEN SATURATION: 100 % | DIASTOLIC BLOOD PRESSURE: 70 MMHG | HEART RATE: 79 BPM | RESPIRATION RATE: 16 BRPM | BODY MASS INDEX: 28.71 KG/M2 | SYSTOLIC BLOOD PRESSURE: 134 MMHG | WEIGHT: 156 LBS | HEIGHT: 62 IN

## 2021-01-26 DIAGNOSIS — Z01.810 PREOPERATIVE CARDIOVASCULAR EXAMINATION: ICD-10-CM

## 2021-01-26 DIAGNOSIS — E78.5 DYSLIPIDEMIA: ICD-10-CM

## 2021-01-26 DIAGNOSIS — I25.10 CORONARY ARTERY DISEASE DUE TO CALCIFIED CORONARY LESION: ICD-10-CM

## 2021-01-26 DIAGNOSIS — I25.84 CORONARY ARTERY DISEASE DUE TO CALCIFIED CORONARY LESION: ICD-10-CM

## 2021-01-26 DIAGNOSIS — I10 HYPERTENSION, ESSENTIAL: ICD-10-CM

## 2021-01-26 DIAGNOSIS — I49.3 PVC (PREMATURE VENTRICULAR CONTRACTION): ICD-10-CM

## 2021-01-26 DIAGNOSIS — E11.40 TYPE 2 DIABETES MELLITUS WITH DIABETIC NEUROPATHY, WITHOUT LONG-TERM CURRENT USE OF INSULIN (HCC): ICD-10-CM

## 2021-01-26 LAB — EKG IMPRESSION: NORMAL

## 2021-01-26 PROCEDURE — 93000 ELECTROCARDIOGRAM COMPLETE: CPT | Performed by: INTERNAL MEDICINE

## 2021-01-26 PROCEDURE — 99214 OFFICE O/P EST MOD 30 MIN: CPT | Performed by: INTERNAL MEDICINE

## 2021-01-26 ASSESSMENT — FIBROSIS 4 INDEX: FIB4 SCORE: 0.53

## 2021-01-26 NOTE — PROGRESS NOTES
CARDIOLOGY OUTPATIENT FOLLOWUP    PCP: Oleg Lara M.D.    1. Preoperative cardiovascular examination  2. Coronary artery disease due to calcified coronary lesion  3. PVC (premature ventricular contraction)  4. Hypertension, essential  5. Dyslipidemia  6. Type 2 diabetes mellitus with diabetic neuropathy, without long-term current use of insulin (HCC)     Mr. Mckeon is stable from a cardiovascular standpoint, with no further episodes of angina.  He is planning cervical spine surgery for radiculopathy next month and I believe he is medically optimized for this condition though given his past history of ischemic heart disease remains at intermediate risk of cardiovascular complications during intermediate risk surgery.  I recommended holding prasugrel at least 7 days prior to the procedure and ideally continuing aspirin throughout the perioperative period if at all possible.  He should otherwise continue on the chronic cardiovascular medications.  I will update the lipid profile      Follow up with myself in 3 months    Chief Complaint   Patient presents with   • Preoperative CV Eval   • Coronary Artery Disease   • Premature Ventricular Contractions (PVCs)       History: Gabino Mckeon is a 60 y.o. male with a past medical history of complex coronary artery disease with prior CABG and PCI presenting for preoperative cardiac evaluation and follow-up of cardiovascular condition.  In the recent months he developed left arm radiculopathy and is planning to have cervical spine surgery by an anterior approach on 17 February.  He has not been experiencing any angina since our partially successful procedure-which had to be aborted due to malfunction of the laser- on October 2.  He had reduce the dose of Crestor to 20 mg due to myalgias and has been taking it at least 3 times weekly.  He remains retired but is planning to return to work following the surgery          ROS:  All other systems reviewed and negative except as  "per the HPI    PE:  /70 (BP Location: Right arm, Patient Position: Sitting, BP Cuff Size: Adult)   Pulse 79   Resp 16   Ht 1.575 m (5' 2\")   Wt 70.8 kg (156 lb)   SpO2 100%   BMI 28.53 kg/m²   Gen: Well appearing  HEENT: Symmetric face. Anicteric sclerae. Moist mucus membranes  NECK: No JVD. No lymphadenopathy  CARDIAC: Normal PMI, regular, normal S1, S2. without murmur  VASCULATURE: Normal carotid amplitude without bruit.   RESP: Clear to auscultation bilaterally  ABD: Soft, non-tender, non-distended  EXT: No edema, no clubbing or cyanosis  SKIN: Warm and dry  NEURO: No gross deficits  PSYCH: Appropriate affect, participates in conversation    Past Medical History:   Diagnosis Date   • Anginal syndrome (HCC)    • At risk for sleep apnea    • Back pain    • Diabetes (HCC)    • Fall    • H/O heart artery stent     multiple   • Hypertension      No Known Allergies  Outpatient Encounter Medications as of 1/26/2021   Medication Sig Dispense Refill   • Canagliflozin (INVOKANA) 100 MG Tab Take 100 mg by mouth every day. 100 Tab 3   • Canagliflozin 100 MG Tab Take 100 mg by mouth every day. 90 Tab 1   • traMADol (ULTRAM) 50 MG Tab Take 1 Tab by mouth every four hours as needed for up to 90 days. 90 Tab 0   • ranolazine (RANEXA) 500 MG TABLET SR 12 HR      • metoprolol SR (TOPROL XL) 50 MG TABLET SR 24 HR      • predniSONE (DELTASONE) 20 MG Tab Take 60 mg (3 Tablets) at once every day for 5 days. 15 Tab 0   • metoprolol SR (TOPROL XL) 100 MG TABLET SR 24 HR Take 1 Tab by mouth every day. 30 Tab 1   • prasugrel (EFFIENT) 10 MG Tab Take 1 Tab by mouth every day. 30 Tab 1   • ALPRAZolam (XANAX) 0.25 MG Tab Take 1 mg by mouth 1 time daily as needed.     • isosorbide mononitrate SR (IMDUR) 60 MG TABLET SR 24 HR Take 60 mg by mouth every day.     • metFORMIN ER (GLUCOPHAGE XR) 500 MG TABLET SR 24 HR Take 500 mg by mouth 3 times a day.     • omeprazole (PRILOSEC) 40 MG delayed-release capsule Take 40 mg by mouth 2 " times a day.     • nitroglycerin (NITROSTAT) 0.4 MG SL Tab Place 0.4 mg under tongue as needed.     • rosuvastatin (CRESTOR) 40 MG tablet Take 40 mg by mouth every evening.     • aspirin EC (ECOTRIN) 81 MG Tablet Delayed Response Take 81 mg by mouth every day. Took 2 tabs 10/1/2020     • MAGNESIUM PO Take 1 Tab by mouth every day.     • amLODIPine (NORVASC) 10 MG Tab Take 10 mg by mouth every day.       No facility-administered encounter medications on file as of 2021.      Social History     Socioeconomic History   • Marital status:      Spouse name: Not on file   • Number of children: Not on file   • Years of education: Not on file   • Highest education level: Not on file   Occupational History   • Not on file   Social Needs   • Financial resource strain: Not on file   • Food insecurity     Worry: Patient refused     Inability: Patient refused   • Transportation needs     Medical: Patient refused     Non-medical: Patient refused   Tobacco Use   • Smoking status: Former Smoker     Quit date: 10/1/2000     Years since quittin.3   • Smokeless tobacco: Never Used   Substance and Sexual Activity   • Alcohol use: Never     Frequency: Never     Binge frequency: Never   • Drug use: Never   • Sexual activity: Not on file   Lifestyle   • Physical activity     Days per week: Not on file     Minutes per session: Not on file   • Stress: Not on file   Relationships   • Social connections     Talks on phone: Not on file     Gets together: Not on file     Attends Voodoo service: Not on file     Active member of club or organization: Not on file     Attends meetings of clubs or organizations: Not on file     Relationship status: Not on file   • Intimate partner violence     Fear of current or ex partner: Not on file     Emotionally abused: Not on file     Physically abused: Not on file     Forced sexual activity: Not on file   Other Topics Concern   • Not on file   Social History Narrative   • Not on file        Studies  No results found for: CHOLSTRLTOT, LDL, HDL, TRIGLYCERIDE    Lab Results   Component Value Date/Time    SODIUM 134 (L) 10/03/2020 03:36 AM    POTASSIUM 4.5 10/03/2020 03:36 AM    CHLORIDE 105 10/03/2020 03:36 AM    CO2 19 (L) 10/03/2020 03:36 AM    GLUCOSE 165 (H) 10/03/2020 03:36 AM    BUN 12 10/03/2020 03:36 AM    CREATININE 0.87 10/03/2020 03:36 AM     Lab Results   Component Value Date/Time    ALKPHOSPHAT 69 10/03/2020 03:36 AM    ASTSGOT 7 (L) 10/03/2020 03:36 AM    ALTSGPT 9 10/03/2020 03:36 AM    TBILIRUBIN 0.4 10/03/2020 03:36 AM        For this encounter I reviewed the following medical records PCP notes, Recent ER/hospitalization Notes and BMP   For this encounter I directly reviewed catherization films. Well revascularized aside from the diagonal    Today we discussed decisions regarding major surgery with identified patient or procedure risk factors

## 2021-01-29 ENCOUNTER — TELEPHONE (OUTPATIENT)
Dept: CARDIOLOGY | Facility: MEDICAL CENTER | Age: 61
End: 2021-01-29

## 2021-01-30 NOTE — TELEPHONE ENCOUNTER
Received clearance request from [facility] for pt's C6-7 ACDF on 2/17. Faxed BE 1/26 note addressing clearance to [phone]. Receipt confirmed.

## 2021-02-01 ENCOUNTER — PATIENT MESSAGE (OUTPATIENT)
Dept: MEDICAL GROUP | Facility: PHYSICIAN GROUP | Age: 61
End: 2021-02-01

## 2021-02-04 RX ORDER — LANCETS 30 GAUGE
EACH MISCELLANEOUS
Qty: 120 EACH | Refills: 12 | Status: CANCELLED | OUTPATIENT
Start: 2021-02-04

## 2021-02-05 ENCOUNTER — TELEPHONE (OUTPATIENT)
Dept: CARDIOLOGY | Facility: MEDICAL CENTER | Age: 61
End: 2021-02-05

## 2021-02-05 NOTE — TELEPHONE ENCOUNTER
Received clearance request from [facility] for pt's C6-7 ACDF. 1/26/21 BE note addressing clearance was already sent on 1/29. Drafted letter reiterating this information. Will a/w BE signature.

## 2021-02-05 NOTE — LETTER
PROCEDURE/SURGERY CLEARANCE FORM      Encounter Date: 2/5/2021    Patient: Gabino Mckeon  YOB: 1960    CARDIOLOGIST:  Augusto Good MD    REFERRING DOCTOR:  Heavenly Rodriguez MD      The above patient is intermediate risk of cardiovascular complications  during the following intermediate risk procedure/surgery: C6-7 ACDF                                           Additional comments: Pt can hold prasugrel at least 7 days prior to the procedure and ideally continue aspirin throughout the perioperative period if at all possible.  He should otherwise continue on the chronic cardiovascular medications.                 MD Signature   Augusto Good MD

## 2021-02-08 NOTE — TELEPHONE ENCOUNTER
BE signed letter. Faxed letter and 1/26 BE note to [phone]. Receipt confirmed. Letter sent to scanning.

## 2021-02-10 RX ORDER — LANCETS 33 GAUGE
1 EACH MISCELLANEOUS 4 TIMES DAILY
Qty: 120 EACH | Refills: 3 | Status: ON HOLD | OUTPATIENT
Start: 2021-02-10 | End: 2021-02-16 | Stop reason: SDUPTHER

## 2021-02-10 RX ORDER — LANCETS 33 GAUGE
1 EACH MISCELLANEOUS 4 TIMES DAILY
COMMUNITY
End: 2021-02-10 | Stop reason: SDUPTHER

## 2021-02-10 NOTE — PATIENT COMMUNICATION
Received request via: Patient    Was the patient seen in the last year in this department? Yes    Does the patient have an active prescription (recently filled or refills available) for medication(s) requested? No     Requested Prescriptions     Pending Prescriptions Disp Refills   • OneTouch Delica Lancets 33G Misc 120 Each 3     Si Each 4 times a day for 30 days.   • glucose blood strip 120 Strip 3     Si Strip by Other route 4 times a day for 120 days. One touch test strips

## 2021-02-12 ENCOUNTER — HOSPITAL ENCOUNTER (OUTPATIENT)
Dept: RADIOLOGY | Facility: MEDICAL CENTER | Age: 61
End: 2021-02-12
Attending: NEUROLOGICAL SURGERY | Admitting: NEUROLOGICAL SURGERY
Payer: MEDICARE

## 2021-02-12 ENCOUNTER — PRE-ADMISSION TESTING (OUTPATIENT)
Dept: ADMISSIONS | Facility: MEDICAL CENTER | Age: 61
End: 2021-02-12
Attending: NEUROLOGICAL SURGERY
Payer: MEDICARE

## 2021-02-12 DIAGNOSIS — Z01.812 PRE-OPERATIVE LABORATORY EXAMINATION: ICD-10-CM

## 2021-02-12 DIAGNOSIS — Z01.811 PRE-OPERATIVE RESPIRATORY EXAMINATION: ICD-10-CM

## 2021-02-12 LAB
25(OH)D3 SERPL-MCNC: 14 NG/ML (ref 30–100)
ANION GAP SERPL CALC-SCNC: 15 MMOL/L (ref 7–16)
ANISOCYTOSIS BLD QL SMEAR: ABNORMAL
APPEARANCE UR: CLEAR
APTT PPP: 31.9 SEC (ref 24.7–36)
BACTERIA #/AREA URNS HPF: NEGATIVE /HPF
BASOPHILS # BLD AUTO: 0.6 % (ref 0–1.8)
BASOPHILS # BLD: 0.05 K/UL (ref 0–0.12)
BILIRUB UR QL STRIP.AUTO: ABNORMAL
BUN SERPL-MCNC: 12 MG/DL (ref 8–22)
CALCIUM SERPL-MCNC: 10.2 MG/DL (ref 8.5–10.5)
CHLORIDE SERPL-SCNC: 100 MMOL/L (ref 96–112)
CO2 SERPL-SCNC: 22 MMOL/L (ref 20–33)
COLOR UR: YELLOW
COMMENT 1642: NORMAL
CREAT SERPL-MCNC: 0.99 MG/DL (ref 0.5–1.4)
EOSINOPHIL # BLD AUTO: 0.19 K/UL (ref 0–0.51)
EOSINOPHIL NFR BLD: 2.2 % (ref 0–6.9)
EPI CELLS #/AREA URNS HPF: ABNORMAL /HPF
ERYTHROCYTE [DISTWIDTH] IN BLOOD BY AUTOMATED COUNT: 46.7 FL (ref 35.9–50)
EST. AVERAGE GLUCOSE BLD GHB EST-MCNC: 177 MG/DL
GLUCOSE SERPL-MCNC: 148 MG/DL (ref 65–99)
GLUCOSE UR STRIP.AUTO-MCNC: NEGATIVE MG/DL
HBA1C MFR BLD: 7.8 % (ref 0–5.6)
HCT VFR BLD AUTO: 40.1 % (ref 42–52)
HGB BLD-MCNC: 11.9 G/DL (ref 14–18)
HYALINE CASTS #/AREA URNS LPF: ABNORMAL /LPF
IMM GRANULOCYTES # BLD AUTO: 0.03 K/UL (ref 0–0.11)
IMM GRANULOCYTES NFR BLD AUTO: 0.4 % (ref 0–0.9)
INR PPP: 1.06 (ref 0.87–1.13)
KETONES UR STRIP.AUTO-MCNC: ABNORMAL MG/DL
LEUKOCYTE ESTERASE UR QL STRIP.AUTO: NEGATIVE
LYMPHOCYTES # BLD AUTO: 2.66 K/UL (ref 1–4.8)
LYMPHOCYTES NFR BLD: 31.1 % (ref 22–41)
MCH RBC QN AUTO: 22.1 PG (ref 27–33)
MCHC RBC AUTO-ENTMCNC: 29.7 G/DL (ref 33.7–35.3)
MCV RBC AUTO: 74.5 FL (ref 81.4–97.8)
MICRO URNS: ABNORMAL
MICROCYTES BLD QL SMEAR: ABNORMAL
MONOCYTES # BLD AUTO: 0.51 K/UL (ref 0–0.85)
MONOCYTES NFR BLD AUTO: 6 % (ref 0–13.4)
MORPHOLOGY BLD-IMP: NORMAL
MUCOUS THREADS #/AREA URNS HPF: ABNORMAL /HPF
NEUTROPHILS # BLD AUTO: 5.1 K/UL (ref 1.82–7.42)
NEUTROPHILS NFR BLD: 59.7 % (ref 44–72)
NITRITE UR QL STRIP.AUTO: NEGATIVE
NRBC # BLD AUTO: 0 K/UL
NRBC BLD-RTO: 0 /100 WBC
PH UR STRIP.AUTO: 5.5 [PH] (ref 5–8)
PLATELET # BLD AUTO: 415 K/UL (ref 164–446)
PLATELET BLD QL SMEAR: NORMAL
PMV BLD AUTO: 10.9 FL (ref 9–12.9)
POTASSIUM SERPL-SCNC: 3.7 MMOL/L (ref 3.6–5.5)
PROT UR QL STRIP: 100 MG/DL
PROTHROMBIN TIME: 14.1 SEC (ref 12–14.6)
RBC # BLD AUTO: 5.38 M/UL (ref 4.7–6.1)
RBC # URNS HPF: ABNORMAL /HPF
RBC BLD AUTO: PRESENT
RBC UR QL AUTO: NEGATIVE
RENAL EPI CELLS #/AREA URNS HPF: NEGATIVE /HPF
SARS-COV-2 RNA RESP QL NAA+PROBE: NOTDETECTED
SODIUM SERPL-SCNC: 137 MMOL/L (ref 135–145)
SP GR UR STRIP.AUTO: >=1.03
SPECIMEN SOURCE: NORMAL
TRANS CELLS #/AREA URNS HPF: NEGATIVE /HPF
UROBILINOGEN UR STRIP.AUTO-MCNC: 0.2 MG/DL
WBC # BLD AUTO: 8.5 K/UL (ref 4.8–10.8)
WBC #/AREA URNS HPF: ABNORMAL /HPF

## 2021-02-12 PROCEDURE — 83036 HEMOGLOBIN GLYCOSYLATED A1C: CPT

## 2021-02-12 PROCEDURE — C9803 HOPD COVID-19 SPEC COLLECT: HCPCS

## 2021-02-12 PROCEDURE — 85610 PROTHROMBIN TIME: CPT

## 2021-02-12 PROCEDURE — 80048 BASIC METABOLIC PNL TOTAL CA: CPT

## 2021-02-12 PROCEDURE — 85025 COMPLETE CBC W/AUTO DIFF WBC: CPT

## 2021-02-12 PROCEDURE — 36415 COLL VENOUS BLD VENIPUNCTURE: CPT

## 2021-02-12 PROCEDURE — 82306 VITAMIN D 25 HYDROXY: CPT

## 2021-02-12 PROCEDURE — 85730 THROMBOPLASTIN TIME PARTIAL: CPT

## 2021-02-12 PROCEDURE — U0005 INFEC AGEN DETEC AMPLI PROBE: HCPCS

## 2021-02-12 PROCEDURE — 81001 URINALYSIS AUTO W/SCOPE: CPT

## 2021-02-12 PROCEDURE — U0003 INFECTIOUS AGENT DETECTION BY NUCLEIC ACID (DNA OR RNA); SEVERE ACUTE RESPIRATORY SYNDROME CORONAVIRUS 2 (SARS-COV-2) (CORONAVIRUS DISEASE [COVID-19]), AMPLIFIED PROBE TECHNIQUE, MAKING USE OF HIGH THROUGHPUT TECHNOLOGIES AS DESCRIBED BY CMS-2020-01-R: HCPCS

## 2021-02-12 PROCEDURE — 71045 X-RAY EXAM CHEST 1 VIEW: CPT

## 2021-02-12 RX ORDER — CLOPIDOGREL BISULFATE 75 MG/1
75 TABLET ORAL DAILY
Status: ON HOLD | COMMUNITY
End: 2021-06-17

## 2021-02-12 ASSESSMENT — FIBROSIS 4 INDEX: FIB4 SCORE: 0.53

## 2021-02-16 ENCOUNTER — APPOINTMENT (OUTPATIENT)
Dept: PHYSICAL MEDICINE AND REHAB | Facility: MEDICAL CENTER | Age: 61
End: 2021-02-16
Payer: MEDICARE

## 2021-02-16 DIAGNOSIS — E11.40 TYPE 2 DIABETES MELLITUS WITH DIABETIC NEUROPATHY, WITHOUT LONG-TERM CURRENT USE OF INSULIN (HCC): ICD-10-CM

## 2021-02-16 NOTE — TELEPHONE ENCOUNTER
Pt sent mychart message stating pharmacy needed ICD 10 codes for his diabetes supplies.   Called pharmacy and they stated that they could not take it over the phone and that medicare will only cover 1 strip or lancet per day unless insulin dependent.   New order pended with correct amount and diagnosis.

## 2021-02-16 NOTE — OR NURSING
COVID-19 Pre-surgery screenin. Do you have an undiagnosed respiratory illness or symptoms such as coughing or sneezing? No   a. Onset of Sx No  b. Acute vs. chronic respiratory illness No    2. Do you have an unexplained fever greater than 100.4 degrees Fahrenheit or 38 degrees Celsius?     No     3. Have you had direct exposure to a patient who tested positive for Covid-19?    No    4. Have you had any loss of your sense of taste or smell? Have you had N/V or sore throat? No    Patient has been informed of visitor policy and asked to wear a mask upon entering the hospital   Yes

## 2021-02-17 ENCOUNTER — ANESTHESIA EVENT (OUTPATIENT)
Dept: SURGERY | Facility: MEDICAL CENTER | Age: 61
End: 2021-02-17
Payer: MEDICARE

## 2021-02-17 ENCOUNTER — HOSPITAL ENCOUNTER (OUTPATIENT)
Facility: MEDICAL CENTER | Age: 61
End: 2021-02-18
Attending: NEUROLOGICAL SURGERY | Admitting: NEUROLOGICAL SURGERY
Payer: MEDICARE

## 2021-02-17 ENCOUNTER — ANESTHESIA (OUTPATIENT)
Dept: SURGERY | Facility: MEDICAL CENTER | Age: 61
End: 2021-02-17
Payer: MEDICARE

## 2021-02-17 ENCOUNTER — APPOINTMENT (OUTPATIENT)
Dept: RADIOLOGY | Facility: MEDICAL CENTER | Age: 61
End: 2021-02-17
Attending: NEUROLOGICAL SURGERY
Payer: MEDICARE

## 2021-02-17 DIAGNOSIS — G89.18 POSTOPERATIVE PAIN: ICD-10-CM

## 2021-02-17 LAB
GLUCOSE BLD-MCNC: 137 MG/DL (ref 65–99)
GLUCOSE BLD-MCNC: 169 MG/DL (ref 65–99)

## 2021-02-17 PROCEDURE — 95940 IONM IN OPERATNG ROOM 15 MIN: CPT | Performed by: NEUROLOGICAL SURGERY

## 2021-02-17 PROCEDURE — 160036 HCHG PACU - EA ADDL 30 MINS PHASE I: Performed by: NEUROLOGICAL SURGERY

## 2021-02-17 PROCEDURE — 502240 HCHG MISC OR SUPPLY RC 0272: Performed by: NEUROLOGICAL SURGERY

## 2021-02-17 PROCEDURE — A9270 NON-COVERED ITEM OR SERVICE: HCPCS | Performed by: PHYSICIAN ASSISTANT

## 2021-02-17 PROCEDURE — 700102 HCHG RX REV CODE 250 W/ 637 OVERRIDE(OP): Performed by: ANESTHESIOLOGY

## 2021-02-17 PROCEDURE — 160009 HCHG ANES TIME/MIN: Performed by: NEUROLOGICAL SURGERY

## 2021-02-17 PROCEDURE — G0378 HOSPITAL OBSERVATION PER HR: HCPCS

## 2021-02-17 PROCEDURE — 95861 NEEDLE EMG 2 EXTREMITIES: CPT | Performed by: NEUROLOGICAL SURGERY

## 2021-02-17 PROCEDURE — 96374 THER/PROPH/DIAG INJ IV PUSH: CPT

## 2021-02-17 PROCEDURE — 82962 GLUCOSE BLOOD TEST: CPT

## 2021-02-17 PROCEDURE — C1713 ANCHOR/SCREW BN/BN,TIS/BN: HCPCS | Performed by: NEUROLOGICAL SURGERY

## 2021-02-17 PROCEDURE — A9270 NON-COVERED ITEM OR SERVICE: HCPCS | Performed by: ANESTHESIOLOGY

## 2021-02-17 PROCEDURE — 95937 NEUROMUSCULAR JUNCTION TEST: CPT | Performed by: NEUROLOGICAL SURGERY

## 2021-02-17 PROCEDURE — 110454 HCHG SHELL REV 250: Performed by: NEUROLOGICAL SURGERY

## 2021-02-17 PROCEDURE — 500367 HCHG DRAIN KIT, HEMOVAC: Performed by: NEUROLOGICAL SURGERY

## 2021-02-17 PROCEDURE — 95939 C MOTOR EVOKED UPR&LWR LIMBS: CPT | Performed by: NEUROLOGICAL SURGERY

## 2021-02-17 PROCEDURE — 160041 HCHG SURGERY MINUTES - EA ADDL 1 MIN LEVEL 4: Performed by: NEUROLOGICAL SURGERY

## 2021-02-17 PROCEDURE — A9270 NON-COVERED ITEM OR SERVICE: HCPCS | Performed by: NEUROLOGICAL SURGERY

## 2021-02-17 PROCEDURE — 500002 HCHG ADHESIVE, DERMABOND: Performed by: NEUROLOGICAL SURGERY

## 2021-02-17 PROCEDURE — 95938 SOMATOSENSORY TESTING: CPT | Performed by: NEUROLOGICAL SURGERY

## 2021-02-17 PROCEDURE — 502000 HCHG MISC OR IMPLANTS RC 0278: Performed by: NEUROLOGICAL SURGERY

## 2021-02-17 PROCEDURE — 700111 HCHG RX REV CODE 636 W/ 250 OVERRIDE (IP): Performed by: NEUROLOGICAL SURGERY

## 2021-02-17 PROCEDURE — 72040 X-RAY EXAM NECK SPINE 2-3 VW: CPT

## 2021-02-17 PROCEDURE — 700105 HCHG RX REV CODE 258: Performed by: NEUROLOGICAL SURGERY

## 2021-02-17 PROCEDURE — 700111 HCHG RX REV CODE 636 W/ 250 OVERRIDE (IP): Performed by: PHYSICIAN ASSISTANT

## 2021-02-17 PROCEDURE — 500864 HCHG NEEDLE, SPINAL 18G: Performed by: NEUROLOGICAL SURGERY

## 2021-02-17 PROCEDURE — 700106 HCHG RX REV CODE 271: Performed by: NEUROLOGICAL SURGERY

## 2021-02-17 PROCEDURE — 700102 HCHG RX REV CODE 250 W/ 637 OVERRIDE(OP): Performed by: PHYSICIAN ASSISTANT

## 2021-02-17 PROCEDURE — 160029 HCHG SURGERY MINUTES - 1ST 30 MINS LEVEL 4: Performed by: NEUROLOGICAL SURGERY

## 2021-02-17 PROCEDURE — 160048 HCHG OR STATISTICAL LEVEL 1-5: Performed by: NEUROLOGICAL SURGERY

## 2021-02-17 PROCEDURE — 700101 HCHG RX REV CODE 250: Performed by: ANESTHESIOLOGY

## 2021-02-17 PROCEDURE — 501838 HCHG SUTURE GENERAL: Performed by: NEUROLOGICAL SURGERY

## 2021-02-17 PROCEDURE — 160002 HCHG RECOVERY MINUTES (STAT): Performed by: NEUROLOGICAL SURGERY

## 2021-02-17 PROCEDURE — 700101 HCHG RX REV CODE 250: Performed by: PHYSICIAN ASSISTANT

## 2021-02-17 PROCEDURE — 700111 HCHG RX REV CODE 636 W/ 250 OVERRIDE (IP): Performed by: ANESTHESIOLOGY

## 2021-02-17 PROCEDURE — 700101 HCHG RX REV CODE 250: Performed by: NEUROLOGICAL SURGERY

## 2021-02-17 PROCEDURE — 160035 HCHG PACU - 1ST 60 MINS PHASE I: Performed by: NEUROLOGICAL SURGERY

## 2021-02-17 DEVICE — PLATE ANTERIOR CERVICAL 25MM (1TX1+2TCX1=3): Type: IMPLANTABLE DEVICE | Site: SPINE CERVICAL | Status: FUNCTIONAL

## 2021-02-17 DEVICE — SCREW 4.0X15 SELF DRILL VAR (1TX12+2TCX12=36): Type: IMPLANTABLE DEVICE | Site: SPINE CERVICAL | Status: FUNCTIONAL

## 2021-02-17 DEVICE — GRAFT ACTIFUSE ABX PUTTY 1.5ML: Type: IMPLANTABLE DEVICE | Site: SPINE CERVICAL | Status: FUNCTIONAL

## 2021-02-17 DEVICE — IMPLANTABLE DEVICE: Type: IMPLANTABLE DEVICE | Site: SPINE CERVICAL | Status: FUNCTIONAL

## 2021-02-17 RX ORDER — PROCHLORPERAZINE EDISYLATE 5 MG/ML
5-10 INJECTION INTRAMUSCULAR; INTRAVENOUS EVERY 4 HOURS PRN
Status: DISCONTINUED | OUTPATIENT
Start: 2021-02-17 | End: 2021-02-18 | Stop reason: HOSPADM

## 2021-02-17 RX ORDER — METFORMIN HYDROCHLORIDE 500 MG/1
500 TABLET, EXTENDED RELEASE ORAL 3 TIMES DAILY
Status: DISCONTINUED | OUTPATIENT
Start: 2021-02-17 | End: 2021-02-18 | Stop reason: HOSPADM

## 2021-02-17 RX ORDER — DEXMEDETOMIDINE HYDROCHLORIDE 100 UG/ML
INJECTION, SOLUTION INTRAVENOUS PRN
Status: DISCONTINUED | OUTPATIENT
Start: 2021-02-17 | End: 2021-02-17 | Stop reason: SURG

## 2021-02-17 RX ORDER — DIPHENHYDRAMINE HYDROCHLORIDE 50 MG/ML
25 INJECTION INTRAMUSCULAR; INTRAVENOUS EVERY 6 HOURS PRN
Status: DISCONTINUED | OUTPATIENT
Start: 2021-02-17 | End: 2021-02-18 | Stop reason: HOSPADM

## 2021-02-17 RX ORDER — HALOPERIDOL 5 MG/ML
1 INJECTION INTRAMUSCULAR
Status: DISCONTINUED | OUTPATIENT
Start: 2021-02-17 | End: 2021-02-17 | Stop reason: HOSPADM

## 2021-02-17 RX ORDER — AMOXICILLIN 250 MG
1 CAPSULE ORAL NIGHTLY
Status: DISCONTINUED | OUTPATIENT
Start: 2021-02-17 | End: 2021-02-18 | Stop reason: HOSPADM

## 2021-02-17 RX ORDER — OXYCODONE HYDROCHLORIDE AND ACETAMINOPHEN 5; 325 MG/1; MG/1
1-2 TABLET ORAL EVERY 4 HOURS PRN
Status: DISCONTINUED | OUTPATIENT
Start: 2021-02-17 | End: 2021-02-18 | Stop reason: HOSPADM

## 2021-02-17 RX ORDER — AMOXICILLIN 250 MG
1 CAPSULE ORAL
Status: DISCONTINUED | OUTPATIENT
Start: 2021-02-17 | End: 2021-02-18 | Stop reason: HOSPADM

## 2021-02-17 RX ORDER — SODIUM CHLORIDE AND POTASSIUM CHLORIDE 150; 900 MG/100ML; MG/100ML
INJECTION, SOLUTION INTRAVENOUS CONTINUOUS
Status: DISCONTINUED | OUTPATIENT
Start: 2021-02-17 | End: 2021-02-18 | Stop reason: HOSPADM

## 2021-02-17 RX ORDER — HYDROCODONE BITARTRATE AND ACETAMINOPHEN 5; 325 MG/1; MG/1
1-2 TABLET ORAL EVERY 4 HOURS PRN
Status: DISCONTINUED | OUTPATIENT
Start: 2021-02-17 | End: 2021-02-18 | Stop reason: HOSPADM

## 2021-02-17 RX ORDER — MAGNESIUM HYDROXIDE 1200 MG/15ML
LIQUID ORAL
Status: COMPLETED | OUTPATIENT
Start: 2021-02-17 | End: 2021-02-17

## 2021-02-17 RX ORDER — ENEMA 19; 7 G/133ML; G/133ML
1 ENEMA RECTAL
Status: DISCONTINUED | OUTPATIENT
Start: 2021-02-17 | End: 2021-02-18 | Stop reason: HOSPADM

## 2021-02-17 RX ORDER — DIPHENHYDRAMINE HYDROCHLORIDE 50 MG/ML
12.5 INJECTION INTRAMUSCULAR; INTRAVENOUS
Status: DISCONTINUED | OUTPATIENT
Start: 2021-02-17 | End: 2021-02-17 | Stop reason: HOSPADM

## 2021-02-17 RX ORDER — BUPIVACAINE HYDROCHLORIDE AND EPINEPHRINE 5; 5 MG/ML; UG/ML
INJECTION, SOLUTION EPIDURAL; INTRACAUDAL; PERINEURAL
Status: DISCONTINUED | OUTPATIENT
Start: 2021-02-17 | End: 2021-02-17 | Stop reason: HOSPADM

## 2021-02-17 RX ORDER — CEFAZOLIN SODIUM 1 G/3ML
INJECTION, POWDER, FOR SOLUTION INTRAMUSCULAR; INTRAVENOUS PRN
Status: DISCONTINUED | OUTPATIENT
Start: 2021-02-17 | End: 2021-02-17 | Stop reason: SURG

## 2021-02-17 RX ORDER — DEXAMETHASONE SODIUM PHOSPHATE 4 MG/ML
4 INJECTION, SOLUTION INTRA-ARTICULAR; INTRALESIONAL; INTRAMUSCULAR; INTRAVENOUS; SOFT TISSUE EVERY 6 HOURS
Status: COMPLETED | OUTPATIENT
Start: 2021-02-17 | End: 2021-02-18

## 2021-02-17 RX ORDER — DOCUSATE SODIUM 100 MG/1
100 CAPSULE, LIQUID FILLED ORAL 2 TIMES DAILY
Status: DISCONTINUED | OUTPATIENT
Start: 2021-02-17 | End: 2021-02-18 | Stop reason: HOSPADM

## 2021-02-17 RX ORDER — MIDAZOLAM HYDROCHLORIDE 1 MG/ML
INJECTION INTRAMUSCULAR; INTRAVENOUS PRN
Status: DISCONTINUED | OUTPATIENT
Start: 2021-02-17 | End: 2021-02-17 | Stop reason: SURG

## 2021-02-17 RX ORDER — ONDANSETRON 2 MG/ML
INJECTION INTRAMUSCULAR; INTRAVENOUS PRN
Status: DISCONTINUED | OUTPATIENT
Start: 2021-02-17 | End: 2021-02-17 | Stop reason: SURG

## 2021-02-17 RX ORDER — SUFENTANIL CITRATE 50 UG/ML
INJECTION EPIDURAL; INTRAVENOUS PRN
Status: DISCONTINUED | OUTPATIENT
Start: 2021-02-17 | End: 2021-02-17 | Stop reason: SURG

## 2021-02-17 RX ORDER — OXYCODONE HCL 5 MG/5 ML
10 SOLUTION, ORAL ORAL
Status: COMPLETED | OUTPATIENT
Start: 2021-02-17 | End: 2021-02-17

## 2021-02-17 RX ORDER — ALPRAZOLAM 0.25 MG/1
0.25 TABLET ORAL 2 TIMES DAILY PRN
Status: DISCONTINUED | OUTPATIENT
Start: 2021-02-17 | End: 2021-02-18 | Stop reason: HOSPADM

## 2021-02-17 RX ORDER — MORPHINE SULFATE 4 MG/ML
2 INJECTION, SOLUTION INTRAMUSCULAR; INTRAVENOUS
Status: DISCONTINUED | OUTPATIENT
Start: 2021-02-17 | End: 2021-02-18 | Stop reason: HOSPADM

## 2021-02-17 RX ORDER — BISACODYL 10 MG
10 SUPPOSITORY, RECTAL RECTAL
Status: DISCONTINUED | OUTPATIENT
Start: 2021-02-17 | End: 2021-02-18 | Stop reason: HOSPADM

## 2021-02-17 RX ORDER — SODIUM CHLORIDE, SODIUM LACTATE, POTASSIUM CHLORIDE, AND CALCIUM CHLORIDE .6; .31; .03; .02 G/100ML; G/100ML; G/100ML; G/100ML
IRRIGANT IRRIGATION
Status: DISCONTINUED | OUTPATIENT
Start: 2021-02-17 | End: 2021-02-17 | Stop reason: HOSPADM

## 2021-02-17 RX ORDER — VITAMIN B COMPLEX
5000 TABLET ORAL DAILY
Status: DISCONTINUED | OUTPATIENT
Start: 2021-02-17 | End: 2021-02-18 | Stop reason: HOSPADM

## 2021-02-17 RX ORDER — AMLODIPINE BESYLATE 10 MG/1
10 TABLET ORAL DAILY
Status: DISCONTINUED | OUTPATIENT
Start: 2021-02-18 | End: 2021-02-18 | Stop reason: HOSPADM

## 2021-02-17 RX ORDER — GABAPENTIN 300 MG/1
300 CAPSULE ORAL ONCE
Status: COMPLETED | OUTPATIENT
Start: 2021-02-17 | End: 2021-02-17

## 2021-02-17 RX ORDER — ROSUVASTATIN CALCIUM 20 MG/1
40 TABLET, COATED ORAL EVERY EVENING
Status: DISCONTINUED | OUTPATIENT
Start: 2021-02-17 | End: 2021-02-18 | Stop reason: HOSPADM

## 2021-02-17 RX ORDER — ONDANSETRON 4 MG/1
4 TABLET, ORALLY DISINTEGRATING ORAL EVERY 4 HOURS PRN
Status: DISCONTINUED | OUTPATIENT
Start: 2021-02-17 | End: 2021-02-18 | Stop reason: HOSPADM

## 2021-02-17 RX ORDER — PROMETHAZINE HYDROCHLORIDE 25 MG/1
12.5-25 TABLET ORAL EVERY 4 HOURS PRN
Status: DISCONTINUED | OUTPATIENT
Start: 2021-02-17 | End: 2021-02-18 | Stop reason: HOSPADM

## 2021-02-17 RX ORDER — METOPROLOL SUCCINATE 50 MG/1
100 TABLET, EXTENDED RELEASE ORAL DAILY
Status: DISCONTINUED | OUTPATIENT
Start: 2021-02-18 | End: 2021-02-18 | Stop reason: HOSPADM

## 2021-02-17 RX ORDER — PROMETHAZINE HYDROCHLORIDE 25 MG/1
12.5-25 SUPPOSITORY RECTAL EVERY 4 HOURS PRN
Status: DISCONTINUED | OUTPATIENT
Start: 2021-02-17 | End: 2021-02-18 | Stop reason: HOSPADM

## 2021-02-17 RX ORDER — DIPHENHYDRAMINE HCL 25 MG
25 TABLET ORAL EVERY 6 HOURS PRN
Status: DISCONTINUED | OUTPATIENT
Start: 2021-02-17 | End: 2021-02-18 | Stop reason: HOSPADM

## 2021-02-17 RX ORDER — CYCLOBENZAPRINE HCL 10 MG
10 TABLET ORAL EVERY 8 HOURS PRN
Status: DISCONTINUED | OUTPATIENT
Start: 2021-02-17 | End: 2021-02-18 | Stop reason: HOSPADM

## 2021-02-17 RX ORDER — OXYCODONE HCL 5 MG/5 ML
5 SOLUTION, ORAL ORAL
Status: COMPLETED | OUTPATIENT
Start: 2021-02-17 | End: 2021-02-17

## 2021-02-17 RX ORDER — POLYETHYLENE GLYCOL 3350 17 G/17G
1 POWDER, FOR SOLUTION ORAL 2 TIMES DAILY PRN
Status: DISCONTINUED | OUTPATIENT
Start: 2021-02-17 | End: 2021-02-18 | Stop reason: HOSPADM

## 2021-02-17 RX ORDER — ISOSORBIDE MONONITRATE 60 MG/1
60 TABLET, EXTENDED RELEASE ORAL NIGHTLY
Status: DISCONTINUED | OUTPATIENT
Start: 2021-02-17 | End: 2021-02-18 | Stop reason: HOSPADM

## 2021-02-17 RX ORDER — ONDANSETRON 2 MG/ML
4 INJECTION INTRAMUSCULAR; INTRAVENOUS
Status: DISCONTINUED | OUTPATIENT
Start: 2021-02-17 | End: 2021-02-17 | Stop reason: HOSPADM

## 2021-02-17 RX ORDER — SODIUM CHLORIDE, SODIUM LACTATE, POTASSIUM CHLORIDE, CALCIUM CHLORIDE 600; 310; 30; 20 MG/100ML; MG/100ML; MG/100ML; MG/100ML
INJECTION, SOLUTION INTRAVENOUS CONTINUOUS
Status: ACTIVE | OUTPATIENT
Start: 2021-02-17 | End: 2021-02-17

## 2021-02-17 RX ORDER — OMEPRAZOLE 20 MG/1
40 CAPSULE, DELAYED RELEASE ORAL 2 TIMES DAILY
Status: DISCONTINUED | OUTPATIENT
Start: 2021-02-17 | End: 2021-02-18 | Stop reason: HOSPADM

## 2021-02-17 RX ORDER — DEXAMETHASONE SODIUM PHOSPHATE 4 MG/ML
INJECTION, SOLUTION INTRA-ARTICULAR; INTRALESIONAL; INTRAMUSCULAR; INTRAVENOUS; SOFT TISSUE PRN
Status: DISCONTINUED | OUTPATIENT
Start: 2021-02-17 | End: 2021-02-17 | Stop reason: SURG

## 2021-02-17 RX ORDER — LANCETS 33 GAUGE
1 EACH MISCELLANEOUS DAILY
Qty: 100 EACH | Refills: 3 | Status: SHIPPED | OUTPATIENT
Start: 2021-02-17 | End: 2021-04-09 | Stop reason: SDUPTHER

## 2021-02-17 RX ORDER — HYDRALAZINE HYDROCHLORIDE 20 MG/ML
10 INJECTION INTRAMUSCULAR; INTRAVENOUS
Status: DISCONTINUED | OUTPATIENT
Start: 2021-02-17 | End: 2021-02-18 | Stop reason: HOSPADM

## 2021-02-17 RX ORDER — CEFAZOLIN SODIUM 1 G/3ML
INJECTION, POWDER, FOR SOLUTION INTRAMUSCULAR; INTRAVENOUS
Status: DISCONTINUED | OUTPATIENT
Start: 2021-02-17 | End: 2021-02-17 | Stop reason: HOSPADM

## 2021-02-17 RX ORDER — ONDANSETRON 2 MG/ML
4 INJECTION INTRAMUSCULAR; INTRAVENOUS EVERY 4 HOURS PRN
Status: DISCONTINUED | OUTPATIENT
Start: 2021-02-17 | End: 2021-02-18 | Stop reason: HOSPADM

## 2021-02-17 RX ORDER — SODIUM CHLORIDE, SODIUM LACTATE, POTASSIUM CHLORIDE, CALCIUM CHLORIDE 600; 310; 30; 20 MG/100ML; MG/100ML; MG/100ML; MG/100ML
INJECTION, SOLUTION INTRAVENOUS CONTINUOUS
Status: DISCONTINUED | OUTPATIENT
Start: 2021-02-17 | End: 2021-02-17 | Stop reason: HOSPADM

## 2021-02-17 RX ADMIN — SODIUM CHLORIDE, POTASSIUM CHLORIDE, SODIUM LACTATE AND CALCIUM CHLORIDE: 600; 310; 30; 20 INJECTION, SOLUTION INTRAVENOUS at 12:56

## 2021-02-17 RX ADMIN — SUFENTANIL CITRATE 50 MCG: 50 INJECTION, SOLUTION EPIDURAL; INTRAVENOUS at 11:58

## 2021-02-17 RX ADMIN — POTASSIUM CHLORIDE AND SODIUM CHLORIDE: 900; 150 INJECTION, SOLUTION INTRAVENOUS at 18:15

## 2021-02-17 RX ADMIN — OMEPRAZOLE 40 MG: 20 CAPSULE, DELAYED RELEASE ORAL at 18:07

## 2021-02-17 RX ADMIN — DEXMEDETOMIDINE HYDROCHLORIDE 30 MCG: 100 INJECTION, SOLUTION INTRAVENOUS at 11:55

## 2021-02-17 RX ADMIN — PROPOFOL 100 MG: 10 INJECTION, EMULSION INTRAVENOUS at 11:58

## 2021-02-17 RX ADMIN — POVIDONE IODINE 15 ML: 100 SOLUTION TOPICAL at 10:06

## 2021-02-17 RX ADMIN — Medication 5000 UNITS: at 18:07

## 2021-02-17 RX ADMIN — DEXAMETHASONE SODIUM PHOSPHATE 12 MG: 4 INJECTION, SOLUTION INTRA-ARTICULAR; INTRALESIONAL; INTRAMUSCULAR; INTRAVENOUS; SOFT TISSUE at 12:12

## 2021-02-17 RX ADMIN — ONDANSETRON 4 MG: 2 INJECTION INTRAMUSCULAR; INTRAVENOUS at 11:55

## 2021-02-17 RX ADMIN — DEXMEDETOMIDINE HYDROCHLORIDE 30 MCG: 100 INJECTION, SOLUTION INTRAVENOUS at 11:58

## 2021-02-17 RX ADMIN — OXYCODONE HYDROCHLORIDE 5 MG: 5 SOLUTION ORAL at 15:33

## 2021-02-17 RX ADMIN — DEXMEDETOMIDINE HYDROCHLORIDE 20 MCG: 100 INJECTION, SOLUTION INTRAVENOUS at 12:06

## 2021-02-17 RX ADMIN — METFORMIN HYDROCHLORIDE 500 MG: 500 TABLET, EXTENDED RELEASE ORAL at 18:07

## 2021-02-17 RX ADMIN — GABAPENTIN 300 MG: 300 CAPSULE ORAL at 11:38

## 2021-02-17 RX ADMIN — DOCUSATE SODIUM 100 MG: 100 CAPSULE, LIQUID FILLED ORAL at 18:07

## 2021-02-17 RX ADMIN — CEFAZOLIN 2 G: 330 INJECTION, POWDER, FOR SOLUTION INTRAMUSCULAR; INTRAVENOUS at 11:58

## 2021-02-17 RX ADMIN — MIDAZOLAM HYDROCHLORIDE 2 MG: 1 INJECTION, SOLUTION INTRAMUSCULAR; INTRAVENOUS at 11:55

## 2021-02-17 RX ADMIN — DEXAMETHASONE SODIUM PHOSPHATE 4 MG: 4 INJECTION, SOLUTION INTRA-ARTICULAR; INTRALESIONAL; INTRAMUSCULAR; INTRAVENOUS; SOFT TISSUE at 18:13

## 2021-02-17 RX ADMIN — SODIUM CHLORIDE, POTASSIUM CHLORIDE, SODIUM LACTATE AND CALCIUM CHLORIDE: 600; 310; 30; 20 INJECTION, SOLUTION INTRAVENOUS at 10:07

## 2021-02-17 RX ADMIN — HYDROCODONE BITARTRATE AND ACETAMINOPHEN 2 TABLET: 5; 325 TABLET ORAL at 20:33

## 2021-02-17 ASSESSMENT — COGNITIVE AND FUNCTIONAL STATUS - GENERAL
MOBILITY SCORE: 18
MOVING TO AND FROM BED TO CHAIR: A LITTLE
CLIMB 3 TO 5 STEPS WITH RAILING: A LITTLE
DRESSING REGULAR LOWER BODY CLOTHING: A LITTLE
SUGGESTED CMS G CODE MODIFIER MOBILITY: CK
HELP NEEDED FOR BATHING: A LITTLE
TURNING FROM BACK TO SIDE WHILE IN FLAT BAD: A LITTLE
SUGGESTED CMS G CODE MODIFIER DAILY ACTIVITY: CJ
TOILETING: A LITTLE
DRESSING REGULAR UPPER BODY CLOTHING: A LITTLE
STANDING UP FROM CHAIR USING ARMS: A LITTLE
DAILY ACTIVITIY SCORE: 20
MOVING FROM LYING ON BACK TO SITTING ON SIDE OF FLAT BED: A LITTLE
WALKING IN HOSPITAL ROOM: A LITTLE

## 2021-02-17 ASSESSMENT — LIFESTYLE VARIABLES
TOTAL SCORE: 0
EVER FELT BAD OR GUILTY ABOUT YOUR DRINKING: NO
HAVE PEOPLE ANNOYED YOU BY CRITICIZING YOUR DRINKING: NO
EVER HAD A DRINK FIRST THING IN THE MORNING TO STEADY YOUR NERVES TO GET RID OF A HANGOVER: NO
AVERAGE NUMBER OF DAYS PER WEEK YOU HAVE A DRINK CONTAINING ALCOHOL: 0
DOES PATIENT WANT TO STOP DRINKING: NO
HAVE YOU EVER FELT YOU SHOULD CUT DOWN ON YOUR DRINKING: NO
ALCOHOL_USE: NO
ON A TYPICAL DAY WHEN YOU DRINK ALCOHOL HOW MANY DRINKS DO YOU HAVE: 0
CONSUMPTION TOTAL: NEGATIVE
TOTAL SCORE: 0
HOW MANY TIMES IN THE PAST YEAR HAVE YOU HAD 5 OR MORE DRINKS IN A DAY: 0
TOTAL SCORE: 0

## 2021-02-17 ASSESSMENT — PATIENT HEALTH QUESTIONNAIRE - PHQ9
SUM OF ALL RESPONSES TO PHQ9 QUESTIONS 1 AND 2: 0
2. FEELING DOWN, DEPRESSED, IRRITABLE, OR HOPELESS: NOT AT ALL
1. LITTLE INTEREST OR PLEASURE IN DOING THINGS: NOT AT ALL

## 2021-02-17 ASSESSMENT — PAIN DESCRIPTION - PAIN TYPE
TYPE: ACUTE PAIN;SURGICAL PAIN

## 2021-02-17 ASSESSMENT — PAIN SCALES - GENERAL: PAIN_LEVEL: 0

## 2021-02-17 NOTE — OP REPORT
1. DATE OF SURGERY: 2/17/2021    2. SURGEON:  Heavenly Rodriguez MD, PhD, BREN, Neurosurgeon    3. ASSISTANT:  Claudio Acevedo PA-C  An assistant was crucial to have during the case as the assistant helped with positioning, keeping the field clear of blood and retraction. This case could not be done easily without a qualified assistant. It was medically necessary.     4. TYPE OF ANESTHESIA:  General anesthesia with endotracheal intubation    5. PREOPERATIVE DIAGNOSIS:      1.  Status post previous C5-6 ACDF 2.  Left C7 radiculopathy with a large disc herniation failed conservative therapy with C6-7 cervical stenosis    6. POSTOPERATIVE DIAGNOSIS:  Cervical stenosis    7. 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  He undergone a previous C5-6 ACDF in 2006.  He did well.  In December 2020 had increasing left arm symptoms after moving here.  Her left parascapular pain.  He had pain and weakness in his left hand including but not pressure.  He continued with symptoms in his left arm.  On exam he had 3-4/5 weakness in his left upper extremity particular external rotation, biceps, triceps, wrist extension, his left finger extension and left interossei and  strength.  He had numbness in the C6-7-8 dermatomes on the left.  He had stenosis with a disc herniation with inferiorly migrated fragment.  Offered him surgery the risk benefits alternatives outlined the consultation note he consented he had a preoperative clearance for his vocal cords as well.  We used vocal cord monitoring during the case.    8. PREOPERATIVE PHYSICAL EXAMINATION: See formal admission H and P    9. TITLE OF THE PROCEDURE:  • Exploration is previous C5-6 fusion-this was solid  • Removal of C5-6 anterior cervical plate   • C6-7 anterior cervical decompression using a left sided approach and the microscope (adjacent partial corpectomies performed with greater than 50% vertebral body resection as part of the decompression and complete  discectomy.)  • C6-7 interbody fusion using titanium interbody cages and bone graft substitute.  • C6-7 anterior segmental fixation using a cervical locking plate.  • Microscopic microdissection.  • Fluoroscopic guidance for placement of the screws.    10. OPERATIVE FINDINGS:  Stenosis as outlined above.  There was stenosis and a large left-sided disc herniation    11. OPERATED LEVELS: C6-7      12. IMPLANTS USED:  Sherita  titanium interbody cages  Actifuse  [employer] [place] Translational locking plate    13. COMPLICATIONS:  Nil.    14. ESTIMATED BLOOD LOSS:     10   mL    15. OPERATIVE DETAILS:       After a fully informed consent, the patient was brought to the operating room.  General anesthesia was administered. The case was done with AP and lateral fluoroscopy and neurophysiological monitoring, which was stable throughout.  The patient was given intravenous antibiotic and intravenous dexamethasone.  The patient was positioned on a regular operating table.  The head was placed in gentle extension.  A shoulder roll was in place.  The head was resting on a donut headrest as well.  The shoulders were gently taped down and wrist restraints were used as well a footboard.  All pressure points were padded.     The left side of the neck was prepped and draped in a standard fashion.  Local anesthetic was placed into the wound prior to the skin incision.   After fluoroscopic localization, a transverse incision was effected as localized by the x-ray.  Dissection continued down to the platysma.  The plain above this was extensively undermined. The plain above the platysma was extensively undermined.  The platysma was then split in a longitudinal fashion.  Dissection then continued medial to the carotid sheath, lateral to the pharynx, through the prevertebral fascia, in an extensile fashion, to expose the anterior vertebral bodies.  The prevertebral fascia was divided with monopolar cautery.  The omohyoid muscle, if in the  way, was divided with monopolar cautery.  I then placed a spinal needle into the affected disk space and this was confirmed on lateral fluoroscopy.  The longus colli muscles were then undermined on either side of the operated levels.     Initially I exposed the old plate.  This was to the right of midline.  Working around was challenging.  I disassembled her after unlocking it and removed without incident.  There was bridging bone-C5-6 was fused.  I explored the fusion there was no loosening.    The Shadow-Line self-retaining retractors were then placed medially and laterally as well as cranially and caudally.  An appropriate number of 14 mm Adams distraction pins were then placed under fluoroscopic guidance into the midpoint of the vertebral parallel to the endplates.  The Adams distractor was used to achieve some measure of distraction.  For each affected disk, the disk space was incised and disk material was removed with a curette.     The operating microscope was then bought into the field. Using AM-12 and then an AM-8 Midas Heri drill, a partial corpectomy was affected with a posterior lip of osteophyte drilled down with the AM-8 drill bit. Greater than 50% of adjacent vertebral body in total was removed.     Using a 5-0 angle curette, the PLL was split and the remaining disk, osteophyte and ligament at each affected level was removed with 1 and 2 mm Kerrison punches.  A good central and bilateral foraminal decompression at each level was affected.  Hemostasis was obtained.  This was done at C6-7.    Once all the decompressions were done, I then turned to place the one of the interbody devices.  In each case using the cage trials appropriate sizes on x-ray where found.  I elected to place a 9 mm large cage.  The cages were then packed with bone graft substitute and placed into the interspace using fluoroscopic guidance.  Neuromonitoring continued to be stable.  I then removed the Adams distraction pins.       An appropriately sized anterior cervical locking plate was then secured across the operated levels using fluoroscopic guidance and appropriate length 4 mm diameter screws.  15 mm screws were used.  Bone purchase was good.  The locking apparatus was engaged.  Final AP and lateral x-rays were taken.  Neurophysiologic monitoring was stable.  The pharynx was inspected to ensure there was no injury.      Closure was then affected in a standard fashion using 3-0 Vicryl sutures over a suction subfascial Hemovac. Dermabond was applied to the skin and a dressing and soft collar applied prior to transfer to recovery. All counts were correct and all instruments accounted for.    16 PROGNOSIS:  The surgery went well.  The patient has been decompressed.  At the end of the case, the patient awoke moving his/her upper and lower extremities well.    The plan will be to observe the patient very carefully for the next 24 hours in case there is any bleeding and I would anticipate the patient will be discharged tomorrow morning.  When the patient goes home, he/she will be discharged home on narcotic analgesia, flexeril and oral antibiotic, usually Keflex.  The plan will be to follow up in 2 weeks in the office.  We will call the patient next week to ensure there are not any problems.  He/she can shower in 72 hours but is instructed to keep the wound dry.  The patient has also been instructed to abstain from smoking or any anti-inflammatories. The patient has also been instructed to wear a soft collar except when eating or showering.       Heavenly Rodriguez MD, PhD, BREN

## 2021-02-17 NOTE — ANESTHESIA PREPROCEDURE EVALUATION
Relevant Problems   CARDIAC   (+) Unstable angina (HCC)      GI   (+) GERD (gastroesophageal reflux disease)      ENDO   (+) Type 2 diabetes mellitus, without long-term current use of insulin (HCC)       Physical Exam    Airway   Mallampati: II  TM distance: >3 FB  Neck ROM: full       Cardiovascular - normal exam  Rhythm: regular  Rate: normal  (-) murmur     Dental - normal exam           Pulmonary - normal exam  Breath sounds clear to auscultation     Abdominal    Neurological - normal exam                 Anesthesia Plan    ASA 3   ASA physical status 3 criteria: CAD/stents (> 3 months)    Plan - general       Airway plan will be ETT          Induction: intravenous    Postoperative Plan: Postoperative administration of opioids is intended.    Pertinent diagnostic labs and testing reviewed    Informed Consent:    Anesthetic plan and risks discussed with patient.    Use of blood products discussed with: patient whom consented to blood products.

## 2021-02-17 NOTE — OR NURSING
Pre op admission completed.  Pt educated on surgical plan of care, all questions answered.  Bed in low position and call light within reach.  Hourly rounding in place.  This RN performed excellent hand hygiene and wore a surgical mask at all times.  Pt wore a mask at all times except when doing oral and nasal triple aim care.

## 2021-02-17 NOTE — ANESTHESIA POSTPROCEDURE EVALUATION
Patient: Gabino Mckeon    Procedure Summary     Date: 02/17/21 Room / Location: [place] [address] / SURGERY [facility]    Anesthesia Start: 1154 Anesthesia Stop: 1339    Procedure: DISCECTOMY, SPINE, CERVICAL, ANTERIOR APPROACH, WITH FUSION - REMOVAL OF C5-C6 CERVICAL PLATE WITH C6-C7 CERVICAL DECOMPRESSION AND FUSION (Spine Cervical) Diagnosis: (CERVICAL STENOSIS)    Surgeons: Heavenly Rodriguez M.D. Responsible Provider: Anthony Cochran M.D.    Anesthesia Type: general ASA Status: 3          Final Anesthesia Type: general  Last vitals  BP   Blood Pressure: 136/82    Temp   35.9 °C (96.7 °F)    Pulse   92   Resp        SpO2   100 %      Anesthesia Post Evaluation    Patient location during evaluation: PACU  Patient participation: complete - patient participated  Level of consciousness: lethargic  Pain score: 0    Airway patency: patent  Anesthetic complications: no  Cardiovascular status: hemodynamically stable  Respiratory status: acceptable  Hydration status: euvolemic    PONV: none          There were no known complications for this encounter.     Nurse Pain Score: 0 (NPRS)

## 2021-02-17 NOTE — ANESTHESIA PROCEDURE NOTES
Airway    Date/Time: 2/17/2021 11:58 AM  Performed by: Anthony Cochran M.D.  Authorized by: Anthony Cochran M.D.     Location:  OR  Urgency:  Elective  Difficult Airway: No    Indications for Airway Management:  Anesthesia      Spontaneous Ventilation: absent    Sedation Level:  Deep  Preoxygenated: Yes    Patient Position:  Sniffing  Mask Difficulty Assessment:  1 - vent by mask  Final Airway Type:  Endotracheal airway  Final Endotracheal Airway:  ETT and NIM tube  Cuffed: Yes    Technique Used for Successful ETT Placement:  Video laryngoscopy    Insertion Site:  Oral  Blade Type:  Ronald  Laryngoscope Blade/Videolaryngoscope Blade Size:  4  ETT Size (mm):  8.0  Measured from:  Lips  ETT to Lips (cm):  21  Placement Verified by: auscultation and capnometry    Cormack-Lehane Classification:  Grade IIb - view of arytenoids or posterior of glottis only  Number of Attempts at Approach:  1  Number of Other Approaches Attempted:  0

## 2021-02-17 NOTE — OR NURSING
Pt slow to wake from anesthesia.    Pt on 1 L NC.  No c/o nausea, tolerating PO fluids/juice and medication.  No difficulty swallowing.  Anterior neck surgical site CDI.  Soft collar in place (at all times except to eat).  Hemovac compressed, patent and draining small amount dark red fluid.  BUE strength 5, BLE strength 5.  Pt denies LUE numbness or tingling.  VSS, afebrile, YANG, A/O x4.    Glasses in place.   Two clear belonging bags and one black back pack on [facility].  Transferred with mask in place.   Oxygen tank 75% full.

## 2021-02-17 NOTE — ANESTHESIA TIME REPORT
Anesthesia Start and Stop Event Times     Date Time Event    2/17/2021 1135 Ready for Procedure     1154 Anesthesia Start     1339 Anesthesia Stop        Responsible Staff  02/17/21    Name Role Begin End    Anthony Cochran M.D. Anesth 1154 1339        Preop Diagnosis (Free Text):  Pre-op Diagnosis     CERVICAL STENOSIS        Preop Diagnosis (Codes):    Post op Diagnosis  Cervical stenosis of spinal canal      Premium Reason  Non-Premium    Comments:

## 2021-02-18 VITALS
OXYGEN SATURATION: 98 % | WEIGHT: 158.29 LBS | BODY MASS INDEX: 29.13 KG/M2 | TEMPERATURE: 97.9 F | DIASTOLIC BLOOD PRESSURE: 50 MMHG | HEIGHT: 62 IN | HEART RATE: 94 BPM | RESPIRATION RATE: 18 BRPM | SYSTOLIC BLOOD PRESSURE: 109 MMHG

## 2021-02-18 LAB
ANION GAP SERPL CALC-SCNC: 15 MMOL/L (ref 7–16)
BUN SERPL-MCNC: 12 MG/DL (ref 8–22)
CALCIUM SERPL-MCNC: 9.3 MG/DL (ref 8.5–10.5)
CHLORIDE SERPL-SCNC: 101 MMOL/L (ref 96–112)
CO2 SERPL-SCNC: 18 MMOL/L (ref 20–33)
CREAT SERPL-MCNC: 0.79 MG/DL (ref 0.5–1.4)
ERYTHROCYTE [DISTWIDTH] IN BLOOD BY AUTOMATED COUNT: 47.3 FL (ref 35.9–50)
GLUCOSE SERPL-MCNC: 245 MG/DL (ref 65–99)
HCT VFR BLD AUTO: 34.1 % (ref 42–52)
HGB BLD-MCNC: 10.4 G/DL (ref 14–18)
MCH RBC QN AUTO: 22.6 PG (ref 27–33)
MCHC RBC AUTO-ENTMCNC: 30.5 G/DL (ref 33.7–35.3)
MCV RBC AUTO: 74 FL (ref 81.4–97.8)
PLATELET # BLD AUTO: 330 K/UL (ref 164–446)
PMV BLD AUTO: 10.8 FL (ref 9–12.9)
POTASSIUM SERPL-SCNC: 4.2 MMOL/L (ref 3.6–5.5)
RBC # BLD AUTO: 4.61 M/UL (ref 4.7–6.1)
SODIUM SERPL-SCNC: 134 MMOL/L (ref 135–145)
WBC # BLD AUTO: 13.3 K/UL (ref 4.8–10.8)

## 2021-02-18 PROCEDURE — 96376 TX/PRO/DX INJ SAME DRUG ADON: CPT

## 2021-02-18 PROCEDURE — A9270 NON-COVERED ITEM OR SERVICE: HCPCS | Performed by: PHYSICIAN ASSISTANT

## 2021-02-18 PROCEDURE — 36415 COLL VENOUS BLD VENIPUNCTURE: CPT

## 2021-02-18 PROCEDURE — 700102 HCHG RX REV CODE 250 W/ 637 OVERRIDE(OP): Performed by: PHYSICIAN ASSISTANT

## 2021-02-18 PROCEDURE — 80048 BASIC METABOLIC PNL TOTAL CA: CPT

## 2021-02-18 PROCEDURE — G0378 HOSPITAL OBSERVATION PER HR: HCPCS

## 2021-02-18 PROCEDURE — 85027 COMPLETE CBC AUTOMATED: CPT

## 2021-02-18 PROCEDURE — 97165 OT EVAL LOW COMPLEX 30 MIN: CPT

## 2021-02-18 PROCEDURE — 97161 PT EVAL LOW COMPLEX 20 MIN: CPT

## 2021-02-18 PROCEDURE — 700111 HCHG RX REV CODE 636 W/ 250 OVERRIDE (IP): Performed by: PHYSICIAN ASSISTANT

## 2021-02-18 RX ORDER — CYCLOBENZAPRINE HCL 10 MG
10 TABLET ORAL EVERY 8 HOURS PRN
Qty: 21 TABLET | Refills: 0 | Status: SHIPPED | OUTPATIENT
Start: 2021-02-18 | End: 2021-02-25

## 2021-02-18 RX ORDER — CEPHALEXIN 500 MG/1
500 CAPSULE ORAL 4 TIMES DAILY
Qty: 20 CAPSULE | Refills: 0 | Status: SHIPPED | OUTPATIENT
Start: 2021-02-18 | End: 2021-03-09

## 2021-02-18 RX ORDER — HYDROCODONE BITARTRATE AND ACETAMINOPHEN 5; 325 MG/1; MG/1
1 TABLET ORAL EVERY 8 HOURS PRN
Qty: 21 TABLET | Refills: 0 | Status: SHIPPED | OUTPATIENT
Start: 2021-02-18 | End: 2021-02-25

## 2021-02-18 RX ADMIN — HYDROCODONE BITARTRATE AND ACETAMINOPHEN 2 TABLET: 5; 325 TABLET ORAL at 00:35

## 2021-02-18 RX ADMIN — METFORMIN HYDROCHLORIDE 500 MG: 500 TABLET, EXTENDED RELEASE ORAL at 13:25

## 2021-02-18 RX ADMIN — METOPROLOL SUCCINATE 100 MG: 50 TABLET, EXTENDED RELEASE ORAL at 05:13

## 2021-02-18 RX ADMIN — DEXAMETHASONE SODIUM PHOSPHATE 4 MG: 4 INJECTION, SOLUTION INTRA-ARTICULAR; INTRALESIONAL; INTRAMUSCULAR; INTRAVENOUS; SOFT TISSUE at 05:15

## 2021-02-18 RX ADMIN — METFORMIN HYDROCHLORIDE 500 MG: 500 TABLET, EXTENDED RELEASE ORAL at 05:13

## 2021-02-18 RX ADMIN — OMEPRAZOLE 40 MG: 20 CAPSULE, DELAYED RELEASE ORAL at 05:13

## 2021-02-18 RX ADMIN — DEXAMETHASONE SODIUM PHOSPHATE 4 MG: 4 INJECTION, SOLUTION INTRA-ARTICULAR; INTRALESIONAL; INTRAMUSCULAR; INTRAVENOUS; SOFT TISSUE at 00:35

## 2021-02-18 RX ADMIN — AMLODIPINE BESYLATE 10 MG: 10 TABLET ORAL at 05:15

## 2021-02-18 RX ADMIN — Medication 5000 UNITS: at 05:15

## 2021-02-18 RX ADMIN — HYDROCODONE BITARTRATE AND ACETAMINOPHEN 2 TABLET: 5; 325 TABLET ORAL at 05:14

## 2021-02-18 RX ADMIN — HYDROCODONE BITARTRATE AND ACETAMINOPHEN 2 TABLET: 5; 325 TABLET ORAL at 09:44

## 2021-02-18 ASSESSMENT — COGNITIVE AND FUNCTIONAL STATUS - GENERAL
STANDING UP FROM CHAIR USING ARMS: A LITTLE
MOBILITY SCORE: 21
SUGGESTED CMS G CODE MODIFIER DAILY ACTIVITY: CI
DAILY ACTIVITIY SCORE: 23
CLIMB 3 TO 5 STEPS WITH RAILING: A LITTLE
HELP NEEDED FOR BATHING: A LITTLE
WALKING IN HOSPITAL ROOM: A LITTLE
SUGGESTED CMS G CODE MODIFIER MOBILITY: CJ

## 2021-02-18 ASSESSMENT — PAIN DESCRIPTION - PAIN TYPE
TYPE: ACUTE PAIN;SURGICAL PAIN

## 2021-02-18 ASSESSMENT — ACTIVITIES OF DAILY LIVING (ADL): TOILETING: INDEPENDENT

## 2021-02-18 ASSESSMENT — GAIT ASSESSMENTS
DISTANCE (FEET): 200
DISTANCE (FEET): 25
GAIT LEVEL OF ASSIST: SUPERVISED

## 2021-02-18 NOTE — THERAPY
Physical Therapy   Initial Evaluation     Patient Name: Gabino Mckeon  Age:  60 y.o., Sex:  male  Medical Record #: [id number]  Today's Date: 2/18/2021     Precautions: Spinal / Back Precautions , Cervical Collar      Assessment  Patient is 60 y.o. male presenting to physical therapy s/p C6-7 ACDF with hx of C5-6 ACDF; soft collar, off for meals. Pt demonstrated steady gait w/o AD, able to negotiate 2 steps with intermittent railing support. Pt reports he will have assist upon DC home. Discussed goals for mobility upon DC home and maintenance of spinal precautions with pt verbalizing understanding. No further acute PT needs at this time. Continue to ambulate unit as tolerated.       Plan    Recommend Physical Therapy for Evaluation only     DC Equipment Recommendations: None  Discharge Recommendations: Anticipate that the patient will have no further physical therapy needs after discharge from the hospital       02/18/21 0939   Precautions   Precautions Spinal / Back Precautions ;Cervical Collar     Comments Soft collar, off for meals   Vitals   O2 Delivery Device None - Room Air   Pain 0 - 10 Group   Therapist Pain Assessment During Activity;Nurse Notified  (no pain reported)   Prior Living Situation   Prior Services Home-Independent   Housing / Facility 2 Story House   Steps Into Home 1   Steps In Home   (flight of stairs)   Equipment Owned None   Lives with - Patient's Self Care Capacity Spouse;Adult Children   Comments pt reports Son is able to assist intermittently (he is a [place] resident)   Prior Level of Functional Mobility   Bed Mobility Independent   Transfer Status Independent   Ambulation Independent   Distance Ambulation (Feet)   (community)   Assistive Devices Used None   Stairs Independent   Cognition    Cognition / Consciousness WDL   Level of Consciousness Alert   Comments pleasant, reports feeling good post op   Active ROM Lower Body    Active ROM Lower Body  WDL   Strength Lower Body   Lower Body  Strength  WDL   Balance Assessment   Sitting Balance (Static) Good   Sitting Balance (Dynamic) Good   Standing Balance (Static) Good   Standing Balance (Dynamic) Fair +   Weight Shift Sitting Good   Weight Shift Standing Fair   Comments w/o AD   Gait Analysis   Gait Level Of Assist Supervised   Assistive Device None   Distance (Feet) 200   # of Times Distance was Traveled 1   Deviation   (slight antalgic w/o LOB)   # of Stairs Climbed 2  (x2 trials, intermittent use of railings)   Level of Assist with Stairs Supervised   Weight Bearing Status No restrictions   Bed Mobility    Scooting Supervised   Comments up to chair pre/post PT session   Functional Mobility   Sit to Stand Supervised   Bed, Chair, Wheelchair Transfer Supervised   Transfer Method Stand Step

## 2021-02-18 NOTE — DISCHARGE SUMMARY
Discharge Summary    CHIEF COMPLAINT ON ADMISSION  No chief complaint on file.      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  He undergone a previous C5-6 ACDF in 2006.  He did well.  In December 2020 had increasing left arm symptoms after moving here.  Her left parascapular pain.  He had pain and weakness in his left hand including but not pressure.  He continued with symptoms in his left arm.  On exam he had 3-4/5 weakness in his left upper extremity particular external rotation, biceps, triceps, wrist extension, his left finger extension and left interossei and  strength.  He had numbness in the C6-7-8 dermatomes on the left.  He had stenosis with a disc herniation with inferiorly migrated fragment.  Offered him surgery the risk benefits alternatives outlined the consultation note he consented he had a preoperative clearance for his vocal cords as well.  We used vocal cord monitoring during the case.    Tre performed:  · Exploration is previous C5-6 fusion-this was solid  · Removal of C5-6 anterior cervical plate   · C6-7 anterior cervical decompression using a left sided approach and the microscope (adjacent partial corpectomies performed with greater than 50% vertebral body resection as part of the decompression and complete discectomy.)  · C6-7 interbody fusion using titanium interbody cages and bone graft substitute.  · C6-7 anterior segmental fixation using a cervical locking plate.  · Microscopic microdissection.  · Fluoroscopic guidance for placement of the screws.    He underwent above operation without complications. On POD #1 his drain was discontinued. He was hemodynamically stable and met Criteria for discharge.    Therefore, he is discharged in good and stable condition to home with close outpatient follow-up.    The patient recovered much more quickly than  anticipated on admission.    Discharge Date  2/18/21    FOLLOW UP ITEMS POST DISCHARGE  F/U with Dr. Rodriguez in 2 and 6 weeks time  81mg ASA ok to start 2/22/21  Plavix ok to start 2/26/21  Ok To shower on Saturday    DISCHARGE DIAGNOSES  Active Problems:    * No active hospital problems. *  Resolved Problems:    * No resolved hospital problems. *      FOLLOW UP  Future Appointments   Date Time Provider Department Center   4/27/2021 12:40 PM Amrit Adkins M.D. [facility] None     No follow-up provider specified.    MEDICATIONS ON DISCHARGE     Medication List      START taking these medications      Instructions   cephALEXin 500 MG Caps  Commonly known as: KEFLEX   Take 1 capsule by mouth 4 times a day.  Dose: 500 mg     cyclobenzaprine 10 mg Tabs  Commonly known as: Flexeril   Take 1 tablet by mouth every 8 hours as needed for Muscle Spasms for up to 7 days.  Dose: 10 mg     HYDROcodone-acetaminophen 5-325 MG Tabs per tablet  Commonly known as: NORCO   Take 1 tablet by mouth every 8 hours as needed for up to 7 days.  Dose: 1 tablet        CHANGE how you take these medications      Instructions   glucose blood strip  What changed: when to take this   Doctor's comments: Once touch test strips  1 Strip by Other route every day. One touch test strips  Dose: 1 Each     metoprolol  MG Tb24  What changed: additional instructions  Commonly known as: TOPROL XL   Take 1 Tab by mouth every day.  Dose: 100 mg     OneTouch Delica Lancets 33G Misc  What changed: when to take this   1 Each every day.  Dose: 1 Each        CONTINUE taking these medications      Instructions   amLODIPine 10 MG Tabs  Commonly known as: NORVASC   Take 10 mg by mouth every day.  Dose: 10 mg     Invokana 100 MG Tabs  Generic drug: Canagliflozin   Take 100 mg by mouth every day.  Dose: 100 mg     isosorbide mononitrate SR 60 MG Tb24  Commonly known as: IMDUR   Take 60 mg by mouth every day.  Dose: 60 mg     MAGNESIUM PO   Take 1 Tab by mouth every  day.  Dose: 1 tablet     metFORMIN  MG Tb24  Commonly known as: GLUCOPHAGE XR   Take 500 mg by mouth 3 times a day.  Dose: 500 mg     omeprazole 40 MG delayed-release capsule  Commonly known as: PRILOSEC   Take 40 mg by mouth 2 times a day.  Dose: 40 mg     rosuvastatin 40 MG tablet  Commonly known as: CRESTOR   Take 40 mg by mouth every evening.  Dose: 40 mg        STOP taking these medications    aspirin EC 81 MG Tbec  Commonly known as: ECOTRIN     clopidogrel 75 MG Tabs  Commonly known as: PLAVIX     prasugrel 10 MG Tabs  Commonly known as: EFFIENT     predniSONE 20 MG Tabs  Commonly known as: DELTASONE     traMADol 50 MG Tabs  Commonly known as: ULTRAM            Allergies  No Known Allergies    DIET  Orders Placed This Encounter   Procedures   • Diet Order Diet: Regular; Miscellaneous modifications: (optional): Vegetarian     Standing Status:   Standing     Number of Occurrences:   1     Order Specific Question:   Diet:     Answer:   Regular [1]     Order Specific Question:   Miscellaneous modifications: (optional)     Answer:   Vegetarian [13]       ACTIVITY  As tolerated.  Weight bearing as tolerated    CONSULTATIONS  None    PROCEDURES  Removacl of C5-C6 cervical plate  C6-C7 ACDF    LABORATORY  Lab Results   Component Value Date    SODIUM 134 (L) 02/18/2021    POTASSIUM 4.2 02/18/2021    CHLORIDE 101 02/18/2021    CO2 18 (L) 02/18/2021    GLUCOSE 245 (H) 02/18/2021    BUN 12 02/18/2021    CREATININE 0.79 02/18/2021        Lab Results   Component Value Date    WBC 13.3 (H) 02/18/2021    HEMOGLOBIN 10.4 (L) 02/18/2021    HEMATOCRIT 34.1 (L) 02/18/2021    PLATELETCT 330 02/18/2021        Total time of the discharge process exceeds 35 minutes.

## 2021-02-18 NOTE — DISCHARGE INSTRUCTIONS
Discharge Instructions    Discharged to home by car with relative. Discharged via wheelchair, hospital escort: Yes.  Special equipment needed: Not Applicable    Be sure to schedule a follow-up appointment with your primary care doctor or any specialists as instructed.     Discharge Plan:   Diet Plan: Discussed  Activity Level: Discussed  Confirmed Follow up Appointment: Patient to Call and Schedule Appointment  Confirmed Symptoms Management: Discussed  Medication Reconciliation Updated: Yes  Influenza Vaccine Indication: Not indicated: Previously immunized this influenza season and > 8 years of age    I understand that a diet low in cholesterol, fat, and sodium is recommended for good health. Unless I have been given specific instructions below for another diet, I accept this instruction as my diet prescription.   Other diet: Diabetic    • Special Instructions:  •  No lifting, bending, or twisting  • No lifting greater than 10 lbs  • No repetitive arm movements  • Keep incision clean & dry  • No hot tubs, baths or submerging in water  • Over the counter stool softener daily & as needed while taking narcotics (colace, miralax, or sennakot)  • NO aspirin, blood thinners or NSAID's (Ibuprofen, motrin, Celebrex, etc) until cleared by surgeon's office  • Do not drive or drink alcohol while taking narcotics  • Take narcotics as prescribed  · Ok to start 81mg ASA on 2/22/21; Ok to start Plavix 2/26/21        · Is patient discharged on Warfarin / Coumadin?   No     Depression / Suicide Risk    As you are discharged from this [facility] facility, it is important to learn how to keep safe from harming yourself.    Recognize the warning signs:  · Abrupt changes in personality, positive or negative- including increase in energy   · Giving away possessions  · Change in eating patterns- significant weight changes-  positive or negative  · Change in sleeping patterns- unable to sleep or sleeping all the time   · Unwillingness  or inability to communicate  · Depression  · Unusual sadness, discouragement and loneliness  · Talk of wanting to die  · Neglect of personal appearance   · Rebelliousness- reckless behavior  · Withdrawal from people/activities they love  · Confusion- inability to concentrate     If you or a loved one observes any of these behaviors or has concerns about self-harm, here's what you can do:  · Talk about it- your feelings and reasons for harming yourself  · Remove any means that you might use to hurt yourself (examples: pills, rope, extension cords, firearm)  · Get professional help from the community (Mental Health, Substance Abuse, psychological counseling)  · Do not be alone:Call your Safe Contact- someone whom you trust who will be there for you.  · Call your local CRISIS HOTLINE 478-8080 or 299-813-1557  · Call your local Children's Mobile Crisis Response Team [place] (744) 491-2797 or www.SaveUp  · Call the toll free National Suicide Prevention Hotlines   · National Suicide Prevention Lifeline 726-925-CLRY (6594)  · National Hope Line Network 800-SUICIDE (101-3600)

## 2021-02-18 NOTE — PROGRESS NOTES
Neurosurgery Progress Note    Subjective:  POD #1 seen with Dr. Rodriguez  Pain controlled w/ orals  Mobilizing  Voiding  Eating and Drinking   No N/V    Exam:  Wound: C/D/I  Labs stable  VSS  Drain: minimal output    BP  Min: 75/49  Max: 122/74  Pulse  Av.9  Min: 64  Max: 94  Resp  Avg: 15  Min: 12  Max: 18  Temp  Av.1 °C (97 °F)  Min: 35.8 °C (96.5 °F)  Max: 36.6 °C (97.9 °F)  SpO2  Av.9 %  Min: 96 %  Max: 100 %    No data recorded    Recent Labs     21  0353   WBC 13.3*   RBC 4.61*   HEMOGLOBIN 10.4*   HEMATOCRIT 34.1*   MCV 74.0*   MCH 22.6*   MCHC 30.5*   RDW 47.3   PLATELETCT 330   MPV 10.8     Recent Labs     21  0353   SODIUM 134*   POTASSIUM 4.2   CHLORIDE 101   CO2 18*   GLUCOSE 245*   BUN 12   CREATININE 0.79   CALCIUM 9.3               Intake/Output       21 0700 - 21 0659 21 0700 - 21 0659      [id number] 7233-7931 Total 4128-6154 8120-0660 Total       Intake    P.O.  193  -- 193  --  -- --    P.O. 193 -- 193 -- -- --    I.V.  1300  -- 1300  --  -- --    Volume (mL) (lactated ringers infusion) 1300 -- 1300 -- -- --    Total Intake 1493 -- 1493 -- -- --       Output    Urine  --  600 600  --  -- --    Number of Times Voided -- 2 x 2 x -- -- --    Urine Void (mL) -- 600 600 -- -- --    Drains  --  0 0  --  -- --    Output (mL) (Closed/Suction Drain Anterior Neck Hemovac) -- 0 0 -- -- --    Blood  20  -- 20  --  -- --    Est. Blood Loss 20 -- 20 -- -- --    Total Output 20 600 620 -- -- --       Net I/O     1473 -600 873 -- -- --            Intake/Output Summary (Last 24 hours) at 2021 0802  Last data filed at 2021 0400  Gross per 24 hour   Intake 1493 ml   Output 620 ml   Net 873 ml            • amLODIPine  10 mg DAILY   • isosorbide mononitrate SR  60 mg Nightly   • metFORMIN ER  500 mg TID   • metoprolol SR  100 mg DAILY   • omeprazole  40 mg BID   • rosuvastatin  40 mg Q EVENING   • Pharmacy Consult Request  1 Each PHARMACY TO DOSE   • MD  ALERT...DO NOT ADMINISTER NSAIDS or ASPIRIN unless ORDERED By Neurosurgery  1 Each PRN   • docusate sodium  100 mg BID   • senna-docusate  1 tablet Nightly   • senna-docusate  1 tablet Q24HRS PRN   • polyethylene glycol/lytes  1 Packet BID PRN   • magnesium hydroxide  30 mL QDAY PRN   • bisacodyl  10 mg Q24HRS PRN   • fleet  1 Each Once PRN   • 0.9 % NaCl with KCl 20 mEq 1,000 mL   Continuous   • diphenhydrAMINE  25 mg Q6HRS PRN    Or   • diphenhydrAMINE  25 mg Q6HRS PRN   • ondansetron  4 mg Q4HRS PRN   • ondansetron  4 mg Q4HRS PRN   • promethazine  12.5-25 mg Q4HRS PRN   • promethazine  12.5-25 mg Q4HRS PRN   • prochlorperazine  5-10 mg Q4HRS PRN   • cyclobenzaprine  10 mg Q8HRS PRN   • ALPRAZolam  0.25 mg BID PRN   • hydrALAZINE  10 mg Q HOUR PRN   • benzocaine-menthol  1 Lozenge Q2HRS PRN   • vitamin D  5,000 Units DAILY   • oxyCODONE-acetaminophen  1-2 tablet Q4HRS PRN   • HYDROcodone-acetaminophen  1-2 tablet Q4HRS PRN   • morphine injection  2 mg Q2HRS PRN       Assessment and Plan:  Hospital day #2  POD #1  Prophylactic anticoagulation: no    Plan:  1. Work with PT/OT  2. D/C hemovac this am  3. D/C home this afternoon  4. D/C instructions: Ok to start 81mg ASA on 2/22/21; Ok to start Plavix 2/26/21

## 2021-02-18 NOTE — CARE PLAN
Problem: Safety  Goal: Will remain free from falls  Outcome: PROGRESSING AS EXPECTED  Note: Bed in lowest position, locked, upper side rails up, treaded socks in place, bed near nursing station, using the call light appropriately      Problem: Infection  Goal: Will remain free from infection  Outcome: PROGRESSING AS EXPECTED  Note: Afebrile, educated about handwashing and signs of infection

## 2021-02-18 NOTE — PROGRESS NOTES
0030 During hourly rounding patient verbalizes experiencing double vision for a couple of hours. Patient denies chest pain, headache, SOB. Pt resting comfortly in bed watching TV, no signs of distress noted.Patient declines any other symptom. Patient educated to report any other symptom. Pt verbalizes understanding. Call light within reach.

## 2021-02-18 NOTE — CARE PLAN
Problem: Communication  Goal: The ability to communicate needs accurately and effectively will improve  Outcome: PROGRESSING AS EXPECTED  Intervention: Educate patient and significant other/support system about the plan of care, procedures, treatments, medications and allow for questions  Note: Plan of Care discussed, all questions answered. Pt effectively communicates needs to staff.       Problem: Discharge Barriers/Planning  Goal: Patient's continuum of care needs will be met  Outcome: PROGRESSING AS EXPECTED  Intervention: Assess potential discharge barriers on admission and throughout hospital stay  Note: Pt expected to DC home today with no needs.

## 2021-02-18 NOTE — PROGRESS NOTES
Dr. Rafat Syed and I reviewed patient. He is sitting up in bed. His pain is very well controlled. His left upper extremity symptoms are much improved.  Drains are functioning plan: #1. Continue with routine postop orders  2.

## 2021-02-18 NOTE — THERAPY
"Occupational Therapy   Initial Evaluation     Patient Name: Gabino Mckeon  Age:  60 y.o., Sex:  male  Medical Record #: [id number]  Today's Date: 2/18/2021       Precautions: Spinal / Back Precautions , Cervical Collar    Comments: Soft collar, off for meals    Assessment  Patient is 60 y.o. male with h/o C5-C6 ACDF, admitted for C6-C7 fusion. Seen now for OT eval. Pt educated on: neutral spine, lifting restriction, compensatory ADL. Pt is completing basic ADL and transfers with no more than supv. He has shower chair in place and plans to sleep in recliner. Reports good support from spouse. No further acute OT needs at this time.     Plan    Recommend Occupational Therapy for Evaluation only    DC Equipment Recommendations: None  Discharge Recommendations: Anticipate that the patient will have no further occupational therapy needs after discharge from the hospital     Subjective    \"My arms and hands are much better than before surgery.\"     Objective       02/18/21 0907   Prior Living Situation   Housing / Facility 2 Story House   Steps Into Home 1   Steps In Home 13   Bathroom Set up Bathtub / Shower Combination;Shower Chair   Comments Pt resides with spouse and 8 y.o. son. Reports spouse can assist if needed with I-ADL.    Prior Level of ADL Function   Comments Pt was indpendent with BADL PTA    Prior Level of IADL Function   Comments Pt was indpendent with I-ADL and functional mobility PTA    Active ROM Upper Body   Active ROM Upper Body  WDL   Strength Upper Body   Upper Body Strength  WDL   Comments reports much improved strength    Sensation Upper Body   Upper Extremity Sensation  WDL   Comments reports much improved paresthesias    Coordination Upper Body   Coordination WDL   Balance Assessment   Sitting Balance (Static) Good   Sitting Balance (Dynamic) Good   Standing Balance (Static) Good   Standing Balance (Dynamic) Fair +   Weight Shift Sitting Good   Weight Shift Standing Fair   Comments no AD, no LOB "   Bed Mobility    Comments up to chair pre and post; plans to sleep in recliner    Functional Mobility   Sit to Stand Supervised   Bed, Chair, Wheelchair Transfer Supervised   Transfer Method Stand Step  (no AD)   Mobility Short gait and transfers without AD

## 2021-02-18 NOTE — CARE PLAN
Problem: Communication  Goal: The ability to communicate needs accurately and effectively will improve  Outcome: PROGRESSING AS EXPECTED  Note: Patient able to communicate needs. All questions answered at this time.      Problem: Safety  Goal: Will remain free from injury  Outcome: PROGRESSING AS EXPECTED  Note: Non-slip socks are on, bed is locked and in lowest position, personal belongings are within reach, room is free of clutter and spills, call light is in place. Patient educated on how to use the call light and how to call for assistance. Patient verbalized understanding.

## 2021-02-19 NOTE — PROGRESS NOTES
Pt discharged home via walking with son and an soft collar brace. IV d/c'd. Prescriptions escribed, all personal belongings sent with pt including backpack. Discharge summary reviewed with pt and all questions answered. Pt agreeable to D/C plan.

## 2021-02-22 DIAGNOSIS — M54.12 CERVICAL RADICULOPATHY: ICD-10-CM

## 2021-02-22 RX ORDER — TRAMADOL HYDROCHLORIDE 50 MG/1
50 TABLET ORAL EVERY 4 HOURS PRN
Qty: 90 TABLET | Refills: 0 | OUTPATIENT
Start: 2021-02-22 | End: 2021-05-23

## 2021-02-22 NOTE — TELEPHONE ENCOUNTER
Received request via: Patient    Was the patient seen in the last year in this department? Yes    Does the patient have an active prescription (recently filled or refills available) for medication(s) requested? No     Requested Prescriptions     Pending Prescriptions Disp Refills   • traMADol (ULTRAM) 50 MG Tab 90 tablet 0     Sig: Take 1 tablet by mouth every four hours as needed for up to 90 days.

## 2021-03-09 ENCOUNTER — OFFICE VISIT (OUTPATIENT)
Dept: MEDICAL GROUP | Facility: PHYSICIAN GROUP | Age: 61
End: 2021-03-09
Payer: MEDICARE

## 2021-03-09 ENCOUNTER — HOSPITAL ENCOUNTER (OUTPATIENT)
Facility: MEDICAL CENTER | Age: 61
End: 2021-03-09
Attending: FAMILY MEDICINE
Payer: MEDICARE

## 2021-03-09 VITALS
DIASTOLIC BLOOD PRESSURE: 78 MMHG | WEIGHT: 159 LBS | OXYGEN SATURATION: 99 % | HEIGHT: 62 IN | SYSTOLIC BLOOD PRESSURE: 136 MMHG | BODY MASS INDEX: 29.26 KG/M2 | HEART RATE: 100 BPM | TEMPERATURE: 98 F | RESPIRATION RATE: 16 BRPM

## 2021-03-09 DIAGNOSIS — Z98.890 HISTORY OF SPINAL SURGERY: ICD-10-CM

## 2021-03-09 DIAGNOSIS — M54.50 CHRONIC MIDLINE LOW BACK PAIN, UNSPECIFIED WHETHER SCIATICA PRESENT: ICD-10-CM

## 2021-03-09 DIAGNOSIS — Z79.899 HIGH RISK MEDICATION USE: ICD-10-CM

## 2021-03-09 DIAGNOSIS — G89.29 CHRONIC MIDLINE LOW BACK PAIN, UNSPECIFIED WHETHER SCIATICA PRESENT: ICD-10-CM

## 2021-03-09 DIAGNOSIS — E11.40 TYPE 2 DIABETES MELLITUS WITH DIABETIC NEUROPATHY, WITHOUT LONG-TERM CURRENT USE OF INSULIN (HCC): ICD-10-CM

## 2021-03-09 PROBLEM — I20.0 UNSTABLE ANGINA (HCC): Status: RESOLVED | Noted: 2020-10-01 | Resolved: 2021-03-09

## 2021-03-09 PROCEDURE — G0480 DRUG TEST DEF 1-7 CLASSES: HCPCS

## 2021-03-09 PROCEDURE — 80307 DRUG TEST PRSMV CHEM ANLYZR: CPT

## 2021-03-09 PROCEDURE — 99214 OFFICE O/P EST MOD 30 MIN: CPT | Performed by: FAMILY MEDICINE

## 2021-03-09 RX ORDER — TRAMADOL HYDROCHLORIDE 50 MG/1
50 TABLET ORAL 2 TIMES DAILY PRN
Qty: 60 TABLET | Refills: 0 | Status: SHIPPED | OUTPATIENT
Start: 2021-03-09 | End: 2021-04-09 | Stop reason: SDUPTHER

## 2021-03-09 ASSESSMENT — ENCOUNTER SYMPTOMS
BACK PAIN: 1
FEVER: 0
BRUISES/BLEEDS EASILY: 0
CONSTITUTIONAL NEGATIVE: 1
CARDIOVASCULAR NEGATIVE: 1
DEPRESSION: 0
MYALGIAS: 0
PALPITATIONS: 0
NAUSEA: 0
TINGLING: 0
HEMOPTYSIS: 0
NEUROLOGICAL NEGATIVE: 1
RESPIRATORY NEGATIVE: 1
HEARTBURN: 0
DOUBLE VISION: 0
COUGH: 0
EYES NEGATIVE: 1
CHILLS: 0
HEADACHES: 0
GASTROINTESTINAL NEGATIVE: 1
BLURRED VISION: 0
PSYCHIATRIC NEGATIVE: 1
DIZZINESS: 0

## 2021-03-09 ASSESSMENT — FIBROSIS 4 INDEX: FIB4 SCORE: 0.43

## 2021-03-10 NOTE — PROGRESS NOTES
Subjective:      Gabino Mckeon is a 60 y.o. male who presents with Medication Refill (tramadol)            1. Chronic midline low back pain, unspecified whether sciatica present  Improved neck pain since surgery, still with some chronic lbp that has been present for years since lumbar surgery  This patient is continuing to use a controlled substance (CS) on a long term basis.  The patient is thoroughly aware of the goals of treatment with the CS  The patient is aware that yearly and random urine drug screens are required.  The patient has been instructed to take the CS only as prescribed.  The patient is prohibited from sharing the CS with any other person.  The patient is instructed to inform the provider if any other CS is taken, of any alcohol or cannabis or other recreational drug use, any treatment for side effects of the CS or complications, if they have CS active rx in other states  The patient has evidence for a reason for the CS  The treatment plan has been discussed with the patient  The  report has been reviewed    - traMADol (ULTRAM) 50 MG Tab; Take 1 tablet by mouth 2 times a day as needed for Severe Pain for up to 30 days.  Dispense: 60 tablet; Refill: 0  - Consent for Opiate Prescription  - PAIN MANAGEMENT SCRN, W/ RFLX TO QNT; Future    2. History of spinal surgery    - traMADol (ULTRAM) 50 MG Tab; Take 1 tablet by mouth 2 times a day as needed for Severe Pain for up to 30 days.  Dispense: 60 tablet; Refill: 0  - Consent for Opiate Prescription  - PAIN MANAGEMENT SCRN, W/ RFLX TO QNT; Future    3. Type 2 diabetes mellitus with diabetic neuropathy, without long-term current use of insulin (HCC)  Currently treated for DM, taking meds and checking bs at home, trying to do DM diet.controlled    4. High risk medication use    - traMADol (ULTRAM) 50 MG Tab; Take 1 tablet by mouth 2 times a day as needed for Severe Pain for up to 30 days.  Dispense: 60 tablet; Refill: 0  - Consent for Opiate  "Prescription  - PAIN MANAGEMENT SCRN, W/ RFLX TO QNT; Future    Past Medical History:  No date: Achalasia of esophagus  No date: Anginal syndrome (HCC)      Comment:  cleared for surgery  No date: At risk for sleep apnea  No date: Back pain  2021: Breath shortness      Comment:  With exertion and related to \"my heart problems\"  No date: Diabetes (HCC)  No date: Fall  No date: H/O heart artery stent      Comment:  multiple  No date: High cholesterol  2021: [place] (hard of hearing)      Comment:  Left ear  No date: Hypertension  No date: Indigestion  2021: Pain      Comment:  Lower back pain and neck.  2021: Snoring      Comment:  No sleep study  No date: Urinary incontinence  Past Surgical History:  2021: CERVICAL DISK AND FUSION ANTERIOR      Comment:  Procedure: DISCECTOMY, SPINE, CERVICAL, ANTERIOR                APPROACH, WITH FUSION - REMOVAL OF C5-C6 CERVICAL PLATE                WITH C6-C7 CERVICAL DECOMPRESSION AND FUSION;  Surgeon:                Heavenly Rodriguez M.D.;  Location: SURGERY [facility];                 Service: Neurosurgery  2014: OTHER CARDIAC SURGERY      Comment:  Heart surgery and multiple stents.  No date: ANAL FISTULECTOMY      Comment:  sphincerectomy  No date: APPENDECTOMY  No date: OTHER CARDIAC SURGERY  No date: OTHER ORTHOPEDIC SURGERY      Comment:  ACDF, L4-5 x2  Social History    Tobacco Use      Smoking status: Former Smoker        Types: Cigarettes        Quit date: 10/1/2000        Years since quittin.4      Smokeless tobacco: Never Used    Alcohol use: Never    Drug use: Never    Review of patient's family history indicates:  Problem: Heart Disease      Relation: Mother          Age of Onset: (Not Specified)  Problem: Hypertension      Relation: Mother          Age of Onset: (Not Specified)  Problem: Heart Disease      Relation: Father          Age of Onset: (Not Specified)  Problem: Hypertension      Relation: Father          Age of Onset: (Not " Specified)  Problem: Heart Disease      Relation: Brother          Age of Onset: (Not Specified)      Current Outpatient Medications: •  traMADol (ULTRAM) 50 MG Tab, Take 1 tablet by mouth 2 times a day as needed for Severe Pain for up to 30 days., Disp: 60 tablet, Rfl: 0•  OneTouch Delica Lancets 33G Misc, 1 Each every day., Disp: 100 Each, Rfl: 3•  glucose blood strip, 1 Strip by Other route every day. One touch test strips, Disp: 100 Strip, Rfl: 3•  Canagliflozin (INVOKANA) 100 MG Tab, Take 100 mg by mouth every day., Disp: 100 Tab, Rfl: 3•  metoprolol SR (TOPROL XL) 100 MG TABLET SR 24 HR, Take 1 Tab by mouth every day. (Patient taking differently: Take 100 mg by mouth every day. Pt took 50 mg morning of Surgery), Disp: 30 Tab, Rfl: 1•  isosorbide mononitrate SR (IMDUR) 60 MG TABLET SR 24 HR, Take 60 mg by mouth every day., Disp: , Rfl: •  metFORMIN ER (GLUCOPHAGE XR) 500 MG TABLET SR 24 HR, Take 500 mg by mouth 3 times a day., Disp: , Rfl: •  omeprazole (PRILOSEC) 40 MG delayed-release capsule, Take 40 mg by mouth 2 times a day., Disp: , Rfl: •  rosuvastatin (CRESTOR) 40 MG tablet, Take 40 mg by mouth every evening., Disp: , Rfl: •  MAGNESIUM PO, Take 1 Tab by mouth every day., Disp: , Rfl: •  amLODIPine (NORVASC) 10 MG Tab, Take 10 mg by mouth every day., Disp: , Rfl:     Patient was instructed on the use of medications, either prescriptions or OTC and informed on when the appropriate follow up time period should be. In addition, patient was also instructed that should any acute worsening occur that they should notify this clinic asap or call 911.          Review of Systems   Constitutional: Negative.  Negative for chills and fever.   HENT: Negative.  Negative for hearing loss.    Eyes: Negative.  Negative for blurred vision and double vision.   Respiratory: Negative.  Negative for cough and hemoptysis.    Cardiovascular: Negative.  Negative for chest pain and palpitations.   Gastrointestinal: Negative.   "Negative for heartburn and nausea.   Genitourinary: Negative.  Negative for dysuria.   Musculoskeletal: Positive for back pain. Negative for myalgias.   Skin: Negative.  Negative for rash.   Neurological: Negative.  Negative for dizziness, tingling and headaches.   Endo/Heme/Allergies: Negative.  Does not bruise/bleed easily.   Psychiatric/Behavioral: Negative.  Negative for depression and suicidal ideas.   All other systems reviewed and are negative.         Objective:     /78 (BP Location: Left arm, Patient Position: Sitting, BP Cuff Size: Adult)   Pulse 100   Temp 36.7 °C (98 °F) (Temporal)   Resp 16   Ht 1.575 m (5' 2\")   Wt 72.1 kg (159 lb)   SpO2 99%   BMI 29.08 kg/m²      Physical Exam  Vitals and nursing note reviewed.   Constitutional:       General: He is not in acute distress.     Appearance: He is well-developed. He is not diaphoretic.   HENT:      Head: Normocephalic and atraumatic.      Mouth/Throat:      Pharynx: No oropharyngeal exudate.   Eyes:      Pupils: Pupils are equal, round, and reactive to light.   Cardiovascular:      Rate and Rhythm: Normal rate and regular rhythm.      Heart sounds: Normal heart sounds. No murmur. No friction rub. No gallop.    Pulmonary:      Effort: Pulmonary effort is normal. No respiratory distress.      Breath sounds: Normal breath sounds. No wheezing or rales.   Chest:      Chest wall: No tenderness.   Musculoskeletal:      Comments: Healed anterior approach neck scar   Neurological:      Mental Status: He is alert and oriented to person, place, and time.   Psychiatric:         Behavior: Behavior normal.         Thought Content: Thought content normal.         Judgment: Judgment normal.                 Assessment/Plan:        1. Chronic midline low back pain, unspecified whether sciatica present    - traMADol (ULTRAM) 50 MG Tab; Take 1 tablet by mouth 2 times a day as needed for Severe Pain for up to 30 days.  Dispense: 60 tablet; Refill: 0  - Consent " for Opiate Prescription  - PAIN MANAGEMENT SCRN, W/ RFLX TO QNT; Future    2. History of spinal surgery    - traMADol (ULTRAM) 50 MG Tab; Take 1 tablet by mouth 2 times a day as needed for Severe Pain for up to 30 days.  Dispense: 60 tablet; Refill: 0  - Consent for Opiate Prescription  - PAIN MANAGEMENT SCRN, W/ RFLX TO QNT; Future    3. Type 2 diabetes mellitus with diabetic neuropathy, without long-term current use of insulin ([facility])      4. High risk medication use  - traMADol (ULTRAM) 50 MG Tab; Take 1 tablet by mouth 2 times a day as needed for Severe Pain for up to 30 days.  Dispense: 60 tablet; Refill: 0  - Consent for Opiate Prescription  - PAIN MANAGEMENT SCRN, W/ RFLX TO QNT; Future

## 2021-03-12 LAB

## 2021-03-14 LAB
NORTRAMADOL UR-MCNC: NORMAL NG/ML
TRAMADOL UR-MCNC: NORMAL NG/ML

## 2021-03-15 DIAGNOSIS — Z23 NEED FOR VACCINATION: ICD-10-CM

## 2021-03-17 LAB
NDESMETH URQUAL 2003130: NEGATIVE
TAPENOSUL URQL 2005879: NEGATIVE
TAPENT GLUC URQNT 2005875: <100 NG/ML
TAPENTADOL UR-MCNC: <50 NG/ML

## 2021-03-22 RX ORDER — ISOSORBIDE MONONITRATE 60 MG/1
60 TABLET, EXTENDED RELEASE ORAL DAILY
Qty: 90 TABLET | Refills: 0 | Status: SHIPPED | OUTPATIENT
Start: 2021-03-22 | End: 2021-05-24 | Stop reason: SDUPTHER

## 2021-03-22 NOTE — TELEPHONE ENCOUNTER
Received request via: Pharmacy    Was the patient seen in the last year in this department? Yes    Does the patient have an active prescription (recently filled or refills available) for medication(s) requested? No     Requested Prescriptions     Pending Prescriptions Disp Refills   • isosorbide mononitrate SR (IMDUR) 60 MG TABLET SR 24 HR 90 tablet 0     Sig: Take 1 tablet by mouth every day.

## 2021-04-01 ENCOUNTER — HOSPITAL ENCOUNTER (OUTPATIENT)
Dept: RADIOLOGY | Facility: MEDICAL CENTER | Age: 61
End: 2021-04-01
Attending: PHYSICIAN ASSISTANT
Payer: COMMERCIAL

## 2021-04-01 DIAGNOSIS — M48.02 SPINAL STENOSIS IN CERVICAL REGION: ICD-10-CM

## 2021-04-01 PROCEDURE — 72050 X-RAY EXAM NECK SPINE 4/5VWS: CPT

## 2021-04-09 DIAGNOSIS — M54.50 CHRONIC MIDLINE LOW BACK PAIN, UNSPECIFIED WHETHER SCIATICA PRESENT: ICD-10-CM

## 2021-04-09 DIAGNOSIS — Z79.899 HIGH RISK MEDICATION USE: ICD-10-CM

## 2021-04-09 DIAGNOSIS — Z98.890 HISTORY OF SPINAL SURGERY: ICD-10-CM

## 2021-04-09 DIAGNOSIS — E11.40 TYPE 2 DIABETES MELLITUS WITH DIABETIC NEUROPATHY, WITHOUT LONG-TERM CURRENT USE OF INSULIN (HCC): ICD-10-CM

## 2021-04-09 DIAGNOSIS — G89.29 CHRONIC MIDLINE LOW BACK PAIN, UNSPECIFIED WHETHER SCIATICA PRESENT: ICD-10-CM

## 2021-04-12 RX ORDER — LANCETS 33 GAUGE
1 EACH MISCELLANEOUS DAILY
Qty: 100 EACH | Refills: 3 | Status: SHIPPED | OUTPATIENT
Start: 2021-04-12

## 2021-04-12 RX ORDER — TRAMADOL HYDROCHLORIDE 50 MG/1
50 TABLET ORAL 2 TIMES DAILY PRN
Qty: 60 TABLET | Refills: 0 | Status: SHIPPED | OUTPATIENT
Start: 2021-04-12 | End: 2021-05-17

## 2021-05-02 NOTE — PROCEDURES
DATE OF SERVICE:  10/02/2020    REFERRING PHYSICIAN:  Augusto Good MD    PROCEDURES:  1.  Left heart catheterization.  2.  Coronary angiography.  3.  Graft angiography.  4.  Attempted PCI of the ostial diagonal branch.  5.  Left ventriculogram.  6.  Monitor conscious sedation.    PREPROCEDURE DIAGNOSIS:  Unstable angina.    POSTPROCEDURE DIAGNOSES:   1. Coronary artery disease with patent left internal mammary artery   to the left anterior descending artery, patent stent in the ostial to mid left   anterior descending artery, patent stent in the diagonal branch originating   through the left anterior descending artery stent strut into the mid portion   with high-grade ostial concentric stenosis, patent stent in the proximal   circumflex artery and patent stent in the right coronary artery.  2. Unsuccessful PCI of ostial ramus instent restenosis.  3. Ejection fraction was estimated to be 45%.    4. Elevated left ventricular end diastolic pressure.    INDICATION:  The patient is a 60-year-old male with prior coronary artery   bypass graft in 2014 with patent left internal mammary artery and occluded   saphenous vein graft, multiple percutaneous interventions.  The patient was   readmitted with chest pain and scheduled for cardiac catheterization.    PROCEDURE:  After informed consent was signed by the patient, patient was   brought to the cardiac catheterization laboratory.  He was prepped and draped   in usual sterile manner.  The left inguinal area was anesthetized with 2%   Xylocaine.  A 4-Chilean sheath was inserted into the left femoral artery using   the modified Seldinger technique under ultrasound guidance.  A 4-Chilean   pigtail catheter was positioned into the left ventricle.  Left angiography was   performed.  This was exchanged for a 4-Chilean JL-4 catheter, which was   positioned into the left main coronary artery.  Coronary angiography was   performed.  This catheter was exchanged over for a 4-Chilean  RECEIVED REPORT, WILL ASSUME CARE OF PT, ASKING FOR SANDWICH, PROVIDED,
COLLECTED UA, TOOK TO LAB, DENIES ANY OTHER NEEDS AT THIS TIME, BED IS LOW,
SRX2, CALL LIGHT IN REACH, WILL CONTINUE PLAN OF CARE JR-4 catheter,   which was positioned into the left internal mammary artery and graft   angiography was performed.  This catheter was exchanged for a 4-Albanian 3DRC   catheter, which was positioned into the right coronary artery.  Coronary   angiography was performed.  The case was discussed with Dr. Good.  This   catheter was removed.  The sheath was upgraded to 6-Albanian.  An EBU 3.5 guide   catheter was positioned into the left main coronary artery.  IV Angiomax was   started.  A  150 wire was used to cross the stents into the diagonal   branch.  A Guidezilla catheter was positioned for support.  Attempts were made   to deliver a  balloon; however, this was unsuccessful.  The balloon   and wire was removed.  A Fielder XT wire was attempted to cross the stents;   however, this was unsuccessful.  The case was rediscussed with Dr. Good, who   had elected to pursue further percutaneous intervention with possible laser   therapy.  The wire was removed.  The case was turned over to Dr. Good's care.    HEMODYNAMIC DATA:  Hemodynamic data shows aortic pressures of 100/60 with mean   of 80 mmHg and /0 with LVEDP of 20 mmHg.    AORTIC VALVE:  There is no significant gradient noted.    LEFT VENTRICULOGRAM:  10 mL of contrast was delivered for 2 seconds.  Ejection   fraction was estimated to be 45%.  There was global hypokinesis noted.    ANGIOGRAM:  1.  Left main coronary artery:  Left main coronary artery is a long,   moderate-to-large caliber vessel with distal 20% taper.  It then trifurcates.  2.  Left anterior descending artery:  Left anterior descending artery is a   long large-caliber vessel, which wraps around the apex.  Ostial to proximal   portion of the vessel, there are patent stents.  There is competitive flow on   the midportion.  Distally, there is flow from patent left internal mammary   artery graft.  3.  Ramus intermedius:  Ramus intermedius is a moderate-caliber bifurcating   vessel  with stent originating through the stent strut of the left anterior   descending artery.  Contains ostial concentric 99% stenosis.  4.  Left circumflex artery:  Left circumflex artery is a nondominant   moderate-caliber vessel with patent stent in the proximal portion.  There is a   moderate-caliber obtuse marginal branch noted free of disease.  5.  Right coronary artery:  Right coronary artery is a dominant large-caliber   vessel with patent stents in the proximal portion.  Mid portion of the vessel   is ectatic and distally, there are diffuse 20-30% stenosis.  There is a long   moderate-caliber posterior descending artery branch with patent stents in the   proximal portion.    IMPRESSION:    1. Coronary artery disease with patent left internal mammary artery   to the left anterior descending artery, patent stent in the ostial to mid left   anterior descending artery, patent stent in the diagonal branch originating   through the left anterior descending artery stent strut into the mid portion   with high-grade ostial concentric stenosis, patent stent in the proximal   circumflex artery and patent stent in the right coronary artery.  2. Unsuccessful PCI of ostial ramus instent restenosis.  3. Ejection fraction was estimated to be 45%.    4. Elevated left ventricular end diastolic pressure.    RECOMMENDATIONS:  Recommend percutaneous intervention by Dr. Good.    SEDATION TIME: The patient's sedation was managed by myself with continuous   face to face time with the patient for 15 minutes from 13:50 to 14:05.         ____________________________________     MD JOSE LIMA / PARKER    DD:  10/02/2020 14:33:24  DT:  10/02/2020 15:33:32    D#:  [id number]  Job#:  [id number]

## 2021-05-06 PROBLEM — Z95.1 HX OF CABG: Status: ACTIVE | Noted: 2021-05-06

## 2021-05-06 PROBLEM — Z95.5 STENTED CORONARY ARTERY: Status: ACTIVE | Noted: 2021-05-06

## 2021-05-06 PROBLEM — I10 ESSENTIAL HYPERTENSION, BENIGN: Status: ACTIVE | Noted: 2021-05-06

## 2021-05-06 PROBLEM — E78.5 DYSLIPIDEMIA: Status: ACTIVE | Noted: 2021-05-06

## 2021-05-06 PROBLEM — I25.10 CORONARY ARTERY DISEASE INVOLVING NATIVE CORONARY ARTERY WITHOUT ANGINA PECTORIS: Status: ACTIVE | Noted: 2021-05-06

## 2021-05-10 RX ORDER — METFORMIN HYDROCHLORIDE 500 MG/1
500 TABLET, EXTENDED RELEASE ORAL 3 TIMES DAILY
Qty: 90 TABLET | Refills: 3 | Status: SHIPPED | OUTPATIENT
Start: 2021-05-10 | End: 2021-06-28 | Stop reason: SDUPTHER

## 2021-05-10 NOTE — TELEPHONE ENCOUNTER
Received request via: Patient    Was the patient seen in the last year in this department? Yes    Does the patient have an active prescription (recently filled or refills available) for medication(s) requested? No     Requested Prescriptions     Pending Prescriptions Disp Refills   • metFORMIN ER (GLUCOPHAGE XR) 500 MG TABLET SR 24 HR 90 tablet 3     Sig: Take 1 tablet by mouth 3 times a day.

## 2021-05-14 DIAGNOSIS — G89.29 CHRONIC MIDLINE LOW BACK PAIN, UNSPECIFIED WHETHER SCIATICA PRESENT: ICD-10-CM

## 2021-05-14 DIAGNOSIS — Z79.899 HIGH RISK MEDICATION USE: ICD-10-CM

## 2021-05-14 DIAGNOSIS — Z98.890 HISTORY OF SPINAL SURGERY: ICD-10-CM

## 2021-05-14 DIAGNOSIS — M54.50 CHRONIC MIDLINE LOW BACK PAIN, UNSPECIFIED WHETHER SCIATICA PRESENT: ICD-10-CM

## 2021-05-17 RX ORDER — TRAMADOL HYDROCHLORIDE 50 MG/1
TABLET ORAL
Qty: 60 TABLET | Refills: 0 | Status: SHIPPED | OUTPATIENT
Start: 2021-05-17 | End: 2021-06-21

## 2021-05-25 ENCOUNTER — TELEPHONE (OUTPATIENT)
Dept: MEDICAL GROUP | Facility: PHYSICIAN GROUP | Age: 61
End: 2021-05-25

## 2021-05-25 RX ORDER — ROSUVASTATIN CALCIUM 40 MG/1
40 TABLET, COATED ORAL EVERY EVENING
Qty: 90 TABLET | Refills: 3 | Status: SHIPPED | OUTPATIENT
Start: 2021-05-25 | End: 2022-01-11

## 2021-05-25 RX ORDER — CANAGLIFLOZIN 100 MG/1
100 TABLET, FILM COATED ORAL DAILY
Qty: 100 TABLET | Refills: 3 | Status: SHIPPED | OUTPATIENT
Start: 2021-05-25 | End: 2022-02-10

## 2021-05-25 RX ORDER — ISOSORBIDE MONONITRATE 60 MG/1
60 TABLET, EXTENDED RELEASE ORAL DAILY
Qty: 90 TABLET | Refills: 3 | Status: SHIPPED | OUTPATIENT
Start: 2021-05-25 | End: 2022-01-11

## 2021-05-25 RX ORDER — METOPROLOL SUCCINATE 100 MG/1
100 TABLET, EXTENDED RELEASE ORAL DAILY
Qty: 30 TABLET | Refills: 1 | Status: SHIPPED | OUTPATIENT
Start: 2021-05-25 | End: 2021-09-20 | Stop reason: SDUPTHER

## 2021-06-02 RX ORDER — METOPROLOL SUCCINATE 100 MG/1
TABLET, EXTENDED RELEASE ORAL
Qty: 30 TABLET | Refills: 11 | OUTPATIENT
Start: 2021-06-02

## 2021-06-02 NOTE — TELEPHONE ENCOUNTER
Received request via: Patient    Was the patient seen in the last year in this department? Yes    Does the patient have an active prescription (recently filled or refills available) for medication(s) requested? No     Requested Prescriptions     Pending Prescriptions Disp Refills   • amLODIPine (NORVASC) 10 MG Tab 30 tablet 3     Sig: Take 1 tablet by mouth every day.     Refused Prescriptions Disp Refills   • metoprolol SR (TOPROL XL) 100 MG TABLET SR 24 HR [Pharmacy Med Name: METOPROLOL SUCC ER 100MG TABLET] 30 tablet 11     Sig: TAKE 1 TABLET BY MOUTH  DAILY     Refused By: YESSICA BERNAL     Reason for Refusal: Patient has requested refill too soon.

## 2021-06-03 RX ORDER — AMLODIPINE BESYLATE 10 MG/1
10 TABLET ORAL DAILY
Qty: 30 TABLET | Refills: 3 | Status: SHIPPED | OUTPATIENT
Start: 2021-06-03 | End: 2021-07-21

## 2021-06-04 ENCOUNTER — TELEPHONE (OUTPATIENT)
Dept: CARDIOLOGY | Facility: MEDICAL CENTER | Age: 61
End: 2021-06-04

## 2021-06-04 DIAGNOSIS — Z79.899 HIGH RISK MEDICATION USE: ICD-10-CM

## 2021-06-04 RX ORDER — OMEPRAZOLE 40 MG/1
40 CAPSULE, DELAYED RELEASE ORAL 2 TIMES DAILY
Qty: 60 CAPSULE | Refills: 3 | Status: SHIPPED | OUTPATIENT
Start: 2021-06-04

## 2021-06-04 NOTE — TELEPHONE ENCOUNTER
LVM for pt in regards to lab work that was ordered at previous OV. Office number given for call back if pt has any questions or has had lab work done outside of [facility]. Pt has follow up appointment scheduled with Dr. Good on 6/8/21.  
Alert/Cooperative/Awake

## 2021-06-04 NOTE — TELEPHONE ENCOUNTER
Received request via: Patient    Was the patient seen in the last year in this department? Yes    Does the patient have an active prescription (recently filled or refills available) for medication(s) requested? No     Requested Prescriptions     Pending Prescriptions Disp Refills   • omeprazole (PRILOSEC) 40 MG delayed-release capsule 60 capsule 3     Sig: Take 1 capsule by mouth 2 times a day.

## 2021-06-17 NOTE — TELEPHONE ENCOUNTER
Received request via: Patient    Was the patient seen in the last year in this department? Yes    Does the patient have an active prescription (recently filled or refills available) for medication(s) requested? No     Requested Prescriptions     Pending Prescriptions Disp Refills   • clopidogrel (PLAVIX) 75 MG Tab 90 tablet 0     Sig: Take 1 tablet by mouth every day.   • Empagliflozin 10 MG Tab 180 tablet 0     Sig: Take 1 Each by mouth 2 times a day.

## 2021-06-18 RX ORDER — CLOPIDOGREL BISULFATE 75 MG/1
75 TABLET ORAL DAILY
Qty: 90 TABLET | Refills: 0 | Status: SHIPPED | OUTPATIENT
Start: 2021-06-18 | End: 2021-09-16 | Stop reason: SDUPTHER

## 2021-06-20 DIAGNOSIS — Z79.899 HIGH RISK MEDICATION USE: ICD-10-CM

## 2021-06-20 DIAGNOSIS — G89.29 CHRONIC MIDLINE LOW BACK PAIN, UNSPECIFIED WHETHER SCIATICA PRESENT: ICD-10-CM

## 2021-06-20 DIAGNOSIS — Z98.890 HISTORY OF SPINAL SURGERY: ICD-10-CM

## 2021-06-20 DIAGNOSIS — M54.50 CHRONIC MIDLINE LOW BACK PAIN, UNSPECIFIED WHETHER SCIATICA PRESENT: ICD-10-CM

## 2021-06-21 RX ORDER — TRAMADOL HYDROCHLORIDE 50 MG/1
TABLET ORAL
Qty: 60 TABLET | Refills: 0 | Status: SHIPPED | OUTPATIENT
Start: 2021-06-21 | End: 2021-07-21

## 2021-06-28 RX ORDER — METFORMIN HYDROCHLORIDE 500 MG/1
500 TABLET, EXTENDED RELEASE ORAL 3 TIMES DAILY
Qty: 270 TABLET | Refills: 3 | Status: SHIPPED | OUTPATIENT
Start: 2021-06-28 | End: 2021-07-01 | Stop reason: SDUPTHER

## 2021-06-28 NOTE — TELEPHONE ENCOUNTER
Received request via: Pharmacy    Was the patient seen in the last year in this department? Yes    Does the patient have an active prescription (recently filled or refills available) for medication(s) requested? No     Requested Prescriptions     Pending Prescriptions Disp Refills   • metFORMIN ER (GLUCOPHAGE XR) 500 MG TABLET SR 24  tablet 3     Sig: Take 1 tablet by mouth 3 times a day.

## 2021-07-01 DIAGNOSIS — E11.40 TYPE 2 DIABETES MELLITUS WITH DIABETIC NEUROPATHY, WITHOUT LONG-TERM CURRENT USE OF INSULIN (HCC): ICD-10-CM

## 2021-07-01 RX ORDER — METFORMIN HYDROCHLORIDE 500 MG/1
500 TABLET, EXTENDED RELEASE ORAL 3 TIMES DAILY
Qty: 270 TABLET | Refills: 3 | Status: SHIPPED | OUTPATIENT
Start: 2021-07-01 | End: 2021-09-16 | Stop reason: SDUPTHER

## 2021-07-13 ENCOUNTER — APPOINTMENT (OUTPATIENT)
Dept: MEDICAL GROUP | Facility: PHYSICIAN GROUP | Age: 61
End: 2021-07-13
Payer: COMMERCIAL

## 2021-07-21 RX ORDER — AMLODIPINE BESYLATE 10 MG/1
10 TABLET ORAL DAILY
Qty: 90 TABLET | Refills: 3 | Status: SHIPPED | OUTPATIENT
Start: 2021-07-21

## 2021-09-16 DIAGNOSIS — E11.40 TYPE 2 DIABETES MELLITUS WITH DIABETIC NEUROPATHY, WITHOUT LONG-TERM CURRENT USE OF INSULIN (HCC): ICD-10-CM

## 2021-09-17 RX ORDER — CLOPIDOGREL BISULFATE 75 MG/1
75 TABLET ORAL DAILY
Qty: 90 TABLET | Refills: 1 | Status: SHIPPED | OUTPATIENT
Start: 2021-09-17 | End: 2021-11-29

## 2021-09-17 RX ORDER — METFORMIN HYDROCHLORIDE 500 MG/1
500 TABLET, EXTENDED RELEASE ORAL 3 TIMES DAILY
Qty: 270 TABLET | Refills: 1 | Status: SHIPPED | OUTPATIENT
Start: 2021-09-17 | End: 2021-11-29

## 2021-09-20 RX ORDER — METOPROLOL SUCCINATE 100 MG/1
100 TABLET, EXTENDED RELEASE ORAL DAILY
Qty: 90 TABLET | Refills: 1 | Status: SHIPPED | OUTPATIENT
Start: 2021-09-20 | End: 2021-12-07

## 2021-10-28 ENCOUNTER — HOSPITAL ENCOUNTER (EMERGENCY)
Facility: MEDICAL CENTER | Age: 61
End: 2021-10-28
Attending: EMERGENCY MEDICINE
Payer: COMMERCIAL

## 2021-10-28 ENCOUNTER — APPOINTMENT (OUTPATIENT)
Dept: RADIOLOGY | Facility: MEDICAL CENTER | Age: 61
End: 2021-10-28
Attending: EMERGENCY MEDICINE
Payer: COMMERCIAL

## 2021-10-28 VITALS
HEIGHT: 62 IN | OXYGEN SATURATION: 98 % | SYSTOLIC BLOOD PRESSURE: 110 MMHG | DIASTOLIC BLOOD PRESSURE: 80 MMHG | WEIGHT: 160.94 LBS | RESPIRATION RATE: 19 BRPM | BODY MASS INDEX: 29.62 KG/M2 | TEMPERATURE: 98 F | HEART RATE: 70 BPM

## 2021-10-28 DIAGNOSIS — R51.9 ACUTE NONINTRACTABLE HEADACHE, UNSPECIFIED HEADACHE TYPE: ICD-10-CM

## 2021-10-28 DIAGNOSIS — R07.9 CHEST PAIN, UNSPECIFIED TYPE: ICD-10-CM

## 2021-10-28 DIAGNOSIS — I25.118 CORONARY ARTERY DISEASE OF NATIVE ARTERY OF NATIVE HEART WITH STABLE ANGINA PECTORIS (HCC): ICD-10-CM

## 2021-10-28 DIAGNOSIS — H57.11 ACUTE RIGHT EYE PAIN: ICD-10-CM

## 2021-10-28 LAB
ALBUMIN SERPL BCP-MCNC: 4.9 G/DL (ref 3.2–4.9)
ALBUMIN/GLOB SERPL: 1.5 G/DL
ALP SERPL-CCNC: 92 U/L (ref 30–99)
ALT SERPL-CCNC: 12 U/L (ref 2–50)
ANION GAP SERPL CALC-SCNC: 19 MMOL/L (ref 7–16)
ANISOCYTOSIS BLD QL SMEAR: ABNORMAL
APTT PPP: 30 SEC (ref 24.7–36)
AST SERPL-CCNC: 16 U/L (ref 12–45)
BASOPHILS # BLD AUTO: 0 % (ref 0–1.8)
BASOPHILS # BLD: 0 K/UL (ref 0–0.12)
BILIRUB SERPL-MCNC: 0.4 MG/DL (ref 0.1–1.5)
BUN SERPL-MCNC: 12 MG/DL (ref 8–22)
CALCIUM SERPL-MCNC: 10 MG/DL (ref 8.5–10.5)
CHLORIDE SERPL-SCNC: 103 MMOL/L (ref 96–112)
CO2 SERPL-SCNC: 19 MMOL/L (ref 20–33)
CREAT SERPL-MCNC: 0.85 MG/DL (ref 0.5–1.4)
EKG IMPRESSION: NORMAL
EOSINOPHIL # BLD AUTO: 0.1 K/UL (ref 0–0.51)
EOSINOPHIL NFR BLD: 0.9 % (ref 0–6.9)
ERYTHROCYTE [DISTWIDTH] IN BLOOD BY AUTOMATED COUNT: 46.2 FL (ref 35.9–50)
GLOBULIN SER CALC-MCNC: 3.2 G/DL (ref 1.9–3.5)
GLUCOSE SERPL-MCNC: 215 MG/DL (ref 65–99)
HCT VFR BLD AUTO: 36.3 % (ref 42–52)
HGB BLD-MCNC: 10.7 G/DL (ref 14–18)
HYPOCHROMIA BLD QL SMEAR: ABNORMAL
INR PPP: 1.06 (ref 0.87–1.13)
LYMPHOCYTES # BLD AUTO: 1.65 K/UL (ref 1–4.8)
LYMPHOCYTES NFR BLD: 15.6 % (ref 22–41)
MANUAL DIFF BLD: NORMAL
MCH RBC QN AUTO: 20.6 PG (ref 27–33)
MCHC RBC AUTO-ENTMCNC: 29.5 G/DL (ref 33.7–35.3)
MCV RBC AUTO: 69.9 FL (ref 81.4–97.8)
MICROCYTES BLD QL SMEAR: ABNORMAL
MONOCYTES # BLD AUTO: 0.28 K/UL (ref 0–0.85)
MONOCYTES NFR BLD AUTO: 2.6 % (ref 0–13.4)
MORPHOLOGY BLD-IMP: NORMAL
NEUTROPHILS # BLD AUTO: 8.58 K/UL (ref 1.82–7.42)
NEUTROPHILS NFR BLD: 80.9 % (ref 44–72)
NRBC # BLD AUTO: 0 K/UL
NRBC BLD-RTO: 0 /100 WBC
OVALOCYTES BLD QL SMEAR: NORMAL
PLATELET # BLD AUTO: 379 K/UL (ref 164–446)
PLATELET BLD QL SMEAR: NORMAL
PMV BLD AUTO: 9.6 FL (ref 9–12.9)
POIKILOCYTOSIS BLD QL SMEAR: NORMAL
POTASSIUM SERPL-SCNC: 4.1 MMOL/L (ref 3.6–5.5)
PROT SERPL-MCNC: 8.1 G/DL (ref 6–8.2)
PROTHROMBIN TIME: 13.5 SEC (ref 12–14.6)
RBC # BLD AUTO: 5.19 M/UL (ref 4.7–6.1)
RBC BLD AUTO: PRESENT
SODIUM SERPL-SCNC: 141 MMOL/L (ref 135–145)
TROPONIN T SERPL-MCNC: 10 NG/L (ref 6–19)
TROPONIN T SERPL-MCNC: 11 NG/L (ref 6–19)
WBC # BLD AUTO: 10.6 K/UL (ref 4.8–10.8)

## 2021-10-28 PROCEDURE — A9270 NON-COVERED ITEM OR SERVICE: HCPCS | Performed by: INTERNAL MEDICINE

## 2021-10-28 PROCEDURE — 85007 BL SMEAR W/DIFF WBC COUNT: CPT

## 2021-10-28 PROCEDURE — 700105 HCHG RX REV CODE 258: Performed by: EMERGENCY MEDICINE

## 2021-10-28 PROCEDURE — 99284 EMERGENCY DEPT VISIT MOD MDM: CPT | Performed by: INTERNAL MEDICINE

## 2021-10-28 PROCEDURE — 700102 HCHG RX REV CODE 250 W/ 637 OVERRIDE(OP): Performed by: EMERGENCY MEDICINE

## 2021-10-28 PROCEDURE — 93005 ELECTROCARDIOGRAM TRACING: CPT | Performed by: EMERGENCY MEDICINE

## 2021-10-28 PROCEDURE — 96375 TX/PRO/DX INJ NEW DRUG ADDON: CPT

## 2021-10-28 PROCEDURE — 84484 ASSAY OF TROPONIN QUANT: CPT

## 2021-10-28 PROCEDURE — 70450 CT HEAD/BRAIN W/O DYE: CPT | Mod: ME

## 2021-10-28 PROCEDURE — 80053 COMPREHEN METABOLIC PANEL: CPT

## 2021-10-28 PROCEDURE — 700102 HCHG RX REV CODE 250 W/ 637 OVERRIDE(OP): Performed by: INTERNAL MEDICINE

## 2021-10-28 PROCEDURE — 700111 HCHG RX REV CODE 636 W/ 250 OVERRIDE (IP): Performed by: EMERGENCY MEDICINE

## 2021-10-28 PROCEDURE — A9270 NON-COVERED ITEM OR SERVICE: HCPCS | Performed by: EMERGENCY MEDICINE

## 2021-10-28 PROCEDURE — 93005 ELECTROCARDIOGRAM TRACING: CPT

## 2021-10-28 PROCEDURE — 96374 THER/PROPH/DIAG INJ IV PUSH: CPT

## 2021-10-28 PROCEDURE — 85730 THROMBOPLASTIN TIME PARTIAL: CPT

## 2021-10-28 PROCEDURE — 700101 HCHG RX REV CODE 250: Performed by: EMERGENCY MEDICINE

## 2021-10-28 PROCEDURE — 85027 COMPLETE CBC AUTOMATED: CPT

## 2021-10-28 PROCEDURE — 71045 X-RAY EXAM CHEST 1 VIEW: CPT

## 2021-10-28 PROCEDURE — 85610 PROTHROMBIN TIME: CPT

## 2021-10-28 PROCEDURE — 99285 EMERGENCY DEPT VISIT HI MDM: CPT

## 2021-10-28 RX ORDER — SODIUM CHLORIDE 9 MG/ML
1000 INJECTION, SOLUTION INTRAVENOUS ONCE
Status: COMPLETED | OUTPATIENT
Start: 2021-10-28 | End: 2021-10-28

## 2021-10-28 RX ORDER — ACETAMINOPHEN 500 MG
1000 TABLET ORAL ONCE
Status: COMPLETED | OUTPATIENT
Start: 2021-10-28 | End: 2021-10-28

## 2021-10-28 RX ORDER — RANOLAZINE 500 MG/1
500 TABLET, EXTENDED RELEASE ORAL 2 TIMES DAILY
Qty: 60 TABLET | Refills: 0 | Status: SHIPPED | OUTPATIENT
Start: 2021-10-28

## 2021-10-28 RX ORDER — METOCLOPRAMIDE HYDROCHLORIDE 5 MG/ML
10 INJECTION INTRAMUSCULAR; INTRAVENOUS ONCE
Status: COMPLETED | OUTPATIENT
Start: 2021-10-28 | End: 2021-10-28

## 2021-10-28 RX ORDER — ERYTHROMYCIN 5 MG/G
1 OINTMENT OPHTHALMIC 4 TIMES DAILY
Qty: 3.5 G | Refills: 0 | Status: SHIPPED | OUTPATIENT
Start: 2021-10-28 | End: 2021-10-31

## 2021-10-28 RX ORDER — EMPAGLIFLOZIN 10 MG/1
10 TABLET, FILM COATED ORAL DAILY
Status: SHIPPED | COMMUNITY
End: 2022-02-10

## 2021-10-28 RX ORDER — PROPARACAINE HYDROCHLORIDE 5 MG/ML
1 SOLUTION/ DROPS OPHTHALMIC ONCE
Status: COMPLETED | OUTPATIENT
Start: 2021-10-28 | End: 2021-10-28

## 2021-10-28 RX ORDER — RANOLAZINE 500 MG/1
500 TABLET, EXTENDED RELEASE ORAL 2 TIMES DAILY
Status: DISCONTINUED | OUTPATIENT
Start: 2021-10-28 | End: 2021-10-29 | Stop reason: HOSPADM

## 2021-10-28 RX ORDER — DIPHENHYDRAMINE HYDROCHLORIDE 50 MG/ML
25 INJECTION INTRAMUSCULAR; INTRAVENOUS ONCE
Status: COMPLETED | OUTPATIENT
Start: 2021-10-28 | End: 2021-10-28

## 2021-10-28 RX ORDER — ISOSORBIDE MONONITRATE 60 MG/1
120 TABLET, EXTENDED RELEASE ORAL
Status: DISCONTINUED | OUTPATIENT
Start: 2021-10-28 | End: 2021-10-29 | Stop reason: HOSPADM

## 2021-10-28 RX ADMIN — ISOSORBIDE MONONITRATE 120 MG: 60 TABLET, EXTENDED RELEASE ORAL at 22:41

## 2021-10-28 RX ADMIN — RANOLAZINE 500 MG: 500 TABLET, EXTENDED RELEASE ORAL at 22:41

## 2021-10-28 RX ADMIN — DIPHENHYDRAMINE HYDROCHLORIDE 25 MG: 50 INJECTION INTRAMUSCULAR; INTRAVENOUS at 18:50

## 2021-10-28 RX ADMIN — SODIUM CHLORIDE 1000 ML: 9 INJECTION, SOLUTION INTRAVENOUS at 18:27

## 2021-10-28 RX ADMIN — PROPARACAINE HYDROCHLORIDE 1 DROP: 5 SOLUTION/ DROPS OPHTHALMIC at 18:45

## 2021-10-28 RX ADMIN — ACETAMINOPHEN 1000 MG: 500 TABLET ORAL at 18:27

## 2021-10-28 RX ADMIN — FLUORESCEIN SODIUM 1 MG: 1 STRIP OPHTHALMIC at 22:15

## 2021-10-28 RX ADMIN — METOCLOPRAMIDE 10 MG: 5 INJECTION, SOLUTION INTRAMUSCULAR; INTRAVENOUS at 18:27

## 2021-10-28 ASSESSMENT — FIBROSIS 4 INDEX: FIB4 SCORE: 0.44

## 2021-10-28 NOTE — ED TRIAGE NOTES
"Chief Complaint   Patient presents with   • Chest Pain     HA/RT sided eye pain x3 days. reports BP keeps increasing. started to have intermittent CP x2 days. reports he is losing sleep with everything going on.    • Headache   • Eye Pain   • Blood Pressure Problem     Pt to triage for above. EKG completed. Protocol ordered.     Pt returned to [facility]. Educated on triage process and to inform staff of any changes.     /96   Pulse (!) 104   Temp 36.3 °C (97.3 °F) (Temporal)   Resp 17   Ht 1.575 m (5' 2\")   Wt 73 kg (160 lb 15 oz)   SpO2 98%   BMI 29.44 kg/m²     "

## 2021-10-29 NOTE — ED NOTES
Medicated per MAR. DC instructions reviewed with pt. Pt verbalized understanding. Pt ambulated to [facility] with steady gait.

## 2021-10-29 NOTE — ED PROVIDER NOTES
ED Provider Note    Scribed for Tonia Esquivel M.D. by Bárbara Carrera. 10/28/2021  6:06 PM    Means of arrival: Walk-in  History obtained from: Patient  History limited by: None noted      CHIEF COMPLAINT  Chief Complaint   Patient presents with   • Chest Pain     HA/RT sided eye pain x3 days. reports BP keeps increasing. started to have intermittent CP x2 days. reports he is losing sleep with everything going on.    • Headache   • Eye Pain   • Blood Pressure Problem       HPI  Gabino Mckeon is a 61 y.o. male with history of coronary artery disease who presents to the Emergency Department with intermittent chest pain onset 2 days ago. He adds that his blood pressure has been increasing, with a maximum blood pressure of 145/90. He notes that his blood pressure is 120/70 baseline, and that when it gets any higher it is often accompanied with chest pain. He states he took 2 Nitro, with no relief of his chest pain. He adds that his last dose of Nitro was 1 hour prior to arrival. He reports his chest pain is currently mild. Patient endorses associated right sided eye pain and sudden headache onset 3 days ago. He notes he was seen by his optometrist a few days ago, and headache started soon after this. Reports normal exam other than signs of cataract.  No history of glaucoma. Patient notes a history of migraines, but that his last one was many years ago. He reports taking Tylenol 9 hours ago, with no relief of his headache. Patient state he has a cardiac history of 7 stents and sees Dr. Good.    REVIEW OF SYSTEMS  Pertinent positive include chest pain, hypertension, right sided eye pain, and headache. Pertinent negative include fevers, shortness of breath, leg swelling. All other systems reviewed and are negative.    PAST MEDICAL HISTORY   has a past medical history of Achalasia of esophagus, Anginal syndrome (HCC), At risk for sleep apnea, Back pain, Breath shortness (02/12/2021), Coronary artery disease of native artery  "of native heart with stable angina pectoris (HCC) (10/28/2021), Diabetes (HCC), Fall, H/O heart artery stent, High cholesterol, [place] (hard of hearing) (2021), Hypertension, Indigestion, Pain (2021), Snoring (2021), and Urinary incontinence.    SOCIAL HISTORY  Social History     Tobacco Use   • Smoking status: Former Smoker     Types: Cigarettes     Quit date: 10/1/2000     Years since quittin.0   • Smokeless tobacco: Never Used   Vaping Use   • Vaping Use: Never used   Substance and Sexual Activity   • Alcohol use: Never   • Drug use: Never       SURGICAL HISTORY   has a past surgical history that includes other cardiac surgery; other cardiac surgery (); appendectomy; other orthopedic surgery; anal fistulectomy; and cervical disk and fusion anterior (2021).    CURRENT MEDICATIONS  Home Medications     Reviewed by Chencho Robledo (Pharmacy Tech) on 10/28/21 at 2012  Med List Status: Complete   Medication Last Dose Status   amLODIPine (NORVASC) 10 MG Tab 10/28/2021 Active   Canagliflozin (INVOKANA) 100 MG Tab 10/27/2021 Active   clopidogrel (PLAVIX) 75 MG Tab 10/28/2021 Active   Empagliflozin (JARDIANCE) 10 MG Tab 10/27/2021 Active   glucose blood strip supply Active   isosorbide mononitrate SR (IMDUR) 60 MG TABLET SR 24 HR 10/27/2021 Active   metFORMIN ER (GLUCOPHAGE XR) 500 MG TABLET SR 24 HR 10/28/2021 Active   metoprolol SR (TOPROL XL) 100 MG TABLET SR 24 HR 10/28/2021 Active   omeprazole (PRILOSEC) 40 MG delayed-release capsule 10/28/2021 Active   OneTouch Delica Lancets 33G Misc supply Active   rosuvastatin (CRESTOR) 40 MG tablet 10/27/2021 Active                ALLERGIES  No Known Allergies    PHYSICAL EXAM   VITAL SIGNS: /74   Pulse 86   Temp 36.3 °C (97.3 °F) (Temporal)   Resp 17   Ht 1.575 m (5' 2\")   Wt 73 kg (160 lb 15 oz)   SpO2 100%   BMI 29.44 kg/m²    Constitutional: Nontoxic appearing male, Alert in no apparent distress.  HENT: Normocephalic, " Atraumatic. Bilateral external ears normal. Nose normal.  Moist mucous membranes.  Oropharynx clear.  Eyes: Pupils are equal and reactive. Conjunctiva normal  Extraocular movements intact without pain.  Neck: Supple, full range of motion  Heart: Regular rate and rhythm.  No murmurs.    Lungs: No respiratory distress, normal work of breathing. Lungs clear to auscultation bilaterally.  Abdomen Soft, no distention.  No tenderness to palpation.  Musculoskeletal: Atraumatic. No obvious deformities noted.  No lower extremity edema.  Skin: Warm, Dry.  No erythema, No rash.   Neurologic: Moving all extremities spontaneously without focal deficits. Alert and oriented x3.  Cranial nerves II-XII intact.  Strength 5/5 and sensation intact throughout all 4 extremities.  No pronator drift.  No dysmetria.  Normal speech. Normal gait.  Psychiatric: Affect normal, Mood normal, Appears appropriate and not intoxicated.    DIAGNOSTIC STUDIES    EKG  Results for orders placed or performed during the hospital encounter of 10/28/21   EKG (NOW)   Result Value Ref Range    Report       [facility] Emergency Dept.    Test Date:  2021-10-28  Pt Name:    RORO HAMILTON                Department: ER  MRN:        [id number]                      Room:  Gender:     Male                         Technician: HRR  :        1960                   Requested By:ER TRIAGE PROTOCOL  Order #:    [id number]                    Reading MD: Tonia Esquivel MD    Measurements  Intervals                                Axis  Rate:       94                           P:          46  AK:         144                          QRS:        27  QRSD:       84                           T:          -15  QT:         360  QTc:        451    Interpretive Statements  SINUS RHYTHM  No ectopy or hypertrophy  BORDERLINE T ABNORMALITIES, INFERIOR LEADS  No acute ST changes  Compared to ECG 2021 13:55:31  Minor T wave flattening inferior leads, no other  acute change  Electronically Signed On 10- 19:20:19 PDT by Tonia Esquivel MD       LABS  Personally reviewed by me  Labs Reviewed   CBC WITH DIFFERENTIAL - Abnormal; Notable for the following components:       Result Value    Hemoglobin 10.7 (*)     Hematocrit 36.3 (*)     MCV 69.9 (*)     MCH 20.6 (*)     MCHC 29.5 (*)     Neutrophils-Polys 80.90 (*)     Lymphocytes 15.60 (*)     Neutrophils (Absolute) 8.58 (*)     All other components within normal limits   COMP METABOLIC PANEL - Abnormal; Notable for the following components:    Co2 19 (*)     Anion Gap 19.0 (*)     Glucose 215 (*)     All other components within normal limits   TROPONIN   ESTIMATED GFR   DIFFERENTIAL MANUAL   PERIPHERAL SMEAR REVIEW   PLATELET ESTIMATE   MORPHOLOGY   TROPONIN   PROTHROMBIN TIME   APTT       RADIOLOGY  Personally reviewed by me  CT-HEAD W/O   Final Result      No CT evidence of acute infarct, hemorrhage or mass.      DX-CHEST-PORTABLE (1 VIEW)   Final Result      No acute cardiopulmonary abnormality.           ED COURSE  Vitals:    10/28/21 1900 10/28/21 2000 10/28/21 2100 10/28/21 2234   BP: 124/67 114/59 105/55 110/80   Pulse: 76 78 78 70   Resp: 20 20 19 19   Temp:    36.7 °C (98 °F)   TempSrc:    Temporal   SpO2: 99% 99% 98% 98%   Weight:       Height:             Medications administered:  Medications   isosorbide mononitrate SR (IMDUR) tablet 120 mg (120 mg Oral Given 10/28/21 2241)   ranolazine (RANEXA) 500 MG SR tablet TB12 500 mg (500 mg Oral Given 10/28/21 2241)   proparacaine (OPTHAINE) 0.5 % ophthalmic solution 1 Drop (1 Drop Both Eyes Given 10/28/21 1845)   NS infusion 1,000 mL (0 mL Intravenous Stopped 10/28/21 1900)   metoclopramide (REGLAN) injection 10 mg (10 mg Intravenous Given 10/28/21 1827)   diphenhydrAMINE (BENADRYL) injection 25 mg (25 mg Intravenous Given 10/28/21 1850)   acetaminophen (TYLENOL) tablet 1,000 mg (1,000 mg Oral Given 10/28/21 1827)   fluorescein ophthalmic strip 1 mg (1 mg Both Eyes  Given 10/28/21 0241)     Patient was given IV fluids for headache.  IV hydration was used because oral hydration was not adequate alone.  Following fluid administration patient's symptoms were improved.    6:06 PM Patient seen and examined at bedside. The patient presents with chest pain. Ordered for EKG, Troponin, CMP, CBC with differential, Morphology, Platelet Estimate, Peripheral Smear Review, Differential Manual, Dx-chest, APTT, and Prothrombin Time to evaluate. Patient will be treated with Tylenol 1,000 mg, Benadryl 25 mg, Reglan 10 mg, and Opthaine 0.5 % for his symptoms.       MEDICAL DECISION MAKING  Patient with significant cardiac history who presents with few day history of chest pain as well as headaches and right eye pain after recently seeing his eye doctor.  He is concerned about elevated blood pressure however his vital signs are normal here and he is well-appearing.  He has no focal neurologic deficits on exam concerning for stroke.  Imaging was performed without evidence of intracranial hemorrhage.  My suspicion for subarachnoid hemorrhage is low.  EKG is unchanged from prior without acute ischemia or arrhythmia.  Troponins are negative x2.  Labs are reassuring other than mild anemia.  Chest x-ray does not show pneumonia, pneumothorax, pulmonary edema.    10:00 PM - I discussed the patient's case and the above findings with Dr. Chang (cardiology) who does not recommend further intervention including stress test or angiography at this point. Last angiography was one year ago with no further intervention possible. He feels comfortable sending him home. He is going to come see the patinet and make some recomendations on medication changes.    9:50 PM - Patient seen at bedside. He reports improvement of his headache and vital signs remain normal.  Further eye exam shows Intraocular pressure of 18 on right and 19 on the left.  Fluorescein exam does not show obvious cornea abrasion and anterior chamber  clear. Patient's pain cleared significantly after proparacaine therefore will treat with antibiotic ointment in case of small corneal abrasion and instructions on follow up with is eye doctor. I informed him of the plan for discharge home. He was advised of all return precautions. Patient verbalizes understanding and agreement to this plan of care.         DISPOSITION:  Patient will be discharged home in stable condition.    FOLLOW UP:  Oleg Lara M.D.  [address] [facility] [id number] [phone]    Schedule an appointment as soon as possible for a visit       Augusto Good M.D.  [address]  [facility] [address]  [facility] [id number] [phone]    Schedule an appointment as soon as possible for a visit       [facility]OUR EYE DOCTOR    Schedule an appointment as soon as possible for a visit       [facility], Emergency Dept  [address] [facility] [id number] [phone]    If symptoms worsen      OUTPATIENT MEDICATIONS:  Discharge Medication List as of 10/28/2021 10:35 PM      START taking these medications    Details   ranolazine (RANEXA) 500 MG TABLET SR 12 HR Take 1 Tablet by mouth 2 times a day., Disp-60 Tablet, R-0, Normal      erythromycin 5 MG/GM Ointment Apply 1 Application to right eye 4 times a day for 3 days., Disp-3.5 g, R-0, Normal             IMPRESSION  (R07.9) Chest pain, unspecified type  (R51.9) Acute nonintractable headache, unspecified headache type  (I25.118) Coronary artery disease of native artery of native heart with stable angina pectoris (HCC)  (H57.11) Acute right eye pain    Results, diagnoses, and treatment options were discussed with the patient and/or family. Patient verbalized understanding of plan of care.           Bárbara DOSS (Miky), am scribing for, and in the presence of, Tonia Esquivel M.D..    Electronically signed by: Bárbara Kimball), 10/28/2021    Tonia DOSS M.D. personally performed the services described in this  documentation, as scribed by Bárbara Carrera in my presence, and it is both accurate and complete. C.    The note accurately reflects work and decisions made by me.  Tonia Esquivel M.D.  10/29/2021  3:31 AM

## 2021-10-29 NOTE — ED NOTES
Pt reports headache different than usual headaches as his blood pressure has improved and usually headache would resolve. Pt also reports taking nitro for intermittent CP. Currently denies CP.

## 2021-10-29 NOTE — CONSULTS
Cardiology Consult Note:    Kenneth Chang M.D.  Date & Time note created:    10/28/2021   8:57 PM     Referring MD:  Dr. Esquivel    Patient ID:   Name:             Gabino Mckeon     YOB: 1960  Age:                 61 y.o.  male   MRN:               [id number]                                                             Chief Complaint / Reason for consult:  Chest pain, stable angina.    History of Present Illness:    This is a 61 years old man with prior history of established coronary artery disease status post CABG, stable angina, hypertension, hyperlipidemia, diabetes, presented to the hospital with recurrent of stable angina.  Of note, in October 2020, patient presented with similar chest pain presentation and underwent coronary angiogram which found patent JACOB to LAD, patent stent in ostial to mid LAD, patent stent in the diagonal branch.  Patient did have unsuccessful PCI of the ostial ramus in-stent restenosis.  Patient was opted for medical management.    I personally interpreted the images of his coronary angiogram.  I personally interpreted the images of his transthoracic echocardiogram along with blood test results.    His troponin T is negative x2.  Normal kidney function with normal GFR.    His transthoracic echocardiogram showed normal LV function and no significant valvular disease in October 2020.    His EKG tracing shows sinus rhythm without evidence of coronary ischemia.  Unchanged from prior EKG.      Review of Systems:      Constitutional: Denies fevers, Denies weight changes  Eyes: Denies changes in vision, no eye pain  Ears/Nose/Throat/Mouth: Denies nasal congestion or sore throat   Cardiovascular: yes chest pain, no palpitations   Respiratory: no shortness of breath , Denies cough  Gastrointestinal/Hepatic: Denies abdominal pain, nausea, vomiting, diarrhea, constipation or GI bleeding   Genitourinary: Denies dysuria or frequency  Musculoskeletal/Rheum: Denies  joint pain and  "swelling   Skin: Denies rash  Neurological: Denies headache, confusion, memory loss or focal weakness/parasthesias  Psychiatric: denies mood disorder   Endocrine: Violet thyroid problems  Heme/Oncology/Lymph Nodes: Denies enlarged lymph nodes, denies brusing or known bleeding disorder  All other systems were reviewed and are negative (AMA/CMS criteria)                Past Medical History:   Past Medical History:   Diagnosis Date   • Achalasia of esophagus    • Anginal syndrome (Formerly [facility])     cleared for surgery   • At risk for sleep apnea    • Back pain    • Breath shortness 02/12/2021    With exertion and related to \"my heart problems\"   • Coronary artery disease of native artery of native heart with stable angina pectoris (Formerly [facility]) 10/28/2021   • Diabetes (Formerly [facility])    • Fall    • H/O heart artery stent     multiple   • High cholesterol    • [place] (hard of hearing) 02/17/2021    Left ear   • Hypertension    • Indigestion    • Pain 02/12/2021    Lower back pain and neck.   • Snoring 02/12/2021    No sleep study   • Urinary incontinence      Active Hospital Problems    Diagnosis    • Coronary artery disease of native artery of native heart with stable angina pectoris (Formerly [facility]) [I25.118]    • Stented coronary artery [Z95.5]    • Hx of CABG [Z95.1]    • Essential hypertension, benign [I10]    • Type 2 diabetes mellitus, without long-term current use of insulin (Formerly [facility]) [E11.9]        Past Surgical History:  Past Surgical History:   Procedure Laterality Date   • CERVICAL DISK AND FUSION ANTERIOR  2/17/2021    Procedure: DISCECTOMY, SPINE, CERVICAL, ANTERIOR APPROACH, WITH FUSION - REMOVAL OF C5-C6 CERVICAL PLATE WITH C6-C7 CERVICAL DECOMPRESSION AND FUSION;  Surgeon: Heavenly Rodriguez M.D.;  Location: SURGERY [facility];  Service: Neurosurgery   • OTHER CARDIAC SURGERY  2014    Heart surgery and multiple stents.   • ANAL FISTULECTOMY      sphincerectomy   • APPENDECTOMY     • OTHER CARDIAC SURGERY     • OTHER ORTHOPEDIC SURGERY      ACDF, " L4-5 x2       Hospital Medications:    Current Facility-Administered Medications:   •  proparacaine (OPTHAINE) 0.5 % ophthalmic solution 1 Drop, 1 Drop, Both Eyes, Once, Tonia Esquivel M.D.  •  isosorbide mononitrate SR (IMDUR) tablet 120 mg, 120 mg, Oral, Q DAY, Kenneth Chang M.D.  •  ranolazine (RANEXA) 500 MG SR tablet TB12 500 mg, 500 mg, Oral, BID, Kenneth Chang M.D.    Current Outpatient Medications:   •  Empagliflozin (JARDIANCE) 10 MG Tab, Take 10 mg by mouth every day., Disp: , Rfl:   •  metoprolol SR (TOPROL XL) 100 MG TABLET SR 24 HR, Take 1 Tablet by mouth every day., Disp: 90 Tablet, Rfl: 1  •  metFORMIN ER (GLUCOPHAGE XR) 500 MG TABLET SR 24 HR, Take 1 Tablet by mouth 3 times a day., Disp: 270 Tablet, Rfl: 1  •  glucose blood strip, 1 Strip by Other route every day. One touch test strips, Disp: 100 Strip, Rfl: 3  •  clopidogrel (PLAVIX) 75 MG Tab, Take 1 Tablet by mouth every day., Disp: 90 Tablet, Rfl: 1  •  amLODIPine (NORVASC) 10 MG Tab, Take 1 tablet by mouth every day. TAKE 1 TABLET BY MOUTH DAILY, Disp: 90 tablet, Rfl: 3  •  omeprazole (PRILOSEC) 40 MG delayed-release capsule, Take 1 capsule by mouth 2 times a day., Disp: 60 capsule, Rfl: 3  •  isosorbide mononitrate SR (IMDUR) 60 MG TABLET SR 24 HR, Take 1 tablet by mouth every day., Disp: 90 tablet, Rfl: 3  •  Canagliflozin (INVOKANA) 100 MG Tab, Take 100 mg by mouth every day., Disp: 100 tablet, Rfl: 3  •  rosuvastatin (CRESTOR) 40 MG tablet, Take 1 tablet by mouth every evening., Disp: 90 tablet, Rfl: 3  •  OneTouch Delica Lancets 33G Misc, 1 Each every day., Disp: 100 Each, Rfl: 3    Current Outpatient Medications:  (Not in a hospital admission)      Medication Allergy:  No Known Allergies    Family History:  Family History   Problem Relation Age of Onset   • Heart Disease Mother    • Hypertension Mother    • Heart Disease Father    • Hypertension Father    • Heart Disease Brother        Social History:  Social History  "    Socioeconomic History   • Marital status:      Spouse name: Not on file   • Number of children: Not on file   • Years of education: Not on file   • Highest education level: Not on file   Occupational History   • Not on file   Tobacco Use   • Smoking status: Former Smoker     Types: Cigarettes     Quit date: 10/1/2000     Years since quittin.0   • Smokeless tobacco: Never Used   Vaping Use   • Vaping Use: Never used   Substance and Sexual Activity   • Alcohol use: Never   • Drug use: Never   • Sexual activity: Not on file   Other Topics Concern   • Not on file   Social History Narrative   • Not on file     Social Determinants of Health     Financial Resource Strain:    • Difficulty of Paying Living Expenses:    Food Insecurity:    • Worried About Running Out of Food in the Last Year:    • Ran Out of Food in the Last Year:    Transportation Needs:    • Lack of Transportation (Medical):    • Lack of Transportation (Non-Medical):    Physical Activity:    • Days of Exercise per Week:    • Minutes of Exercise per Session:    Stress:    • Feeling of Stress :    Social Connections:    • Frequency of Communication with Friends and Family:    • Frequency of Social Gatherings with Friends and Family:    • Attends Druze Services:    • Active Member of Clubs or Organizations:    • Attends Club or Organization Meetings:    • Marital Status:    Intimate Partner Violence:    • Fear of Current or Ex-Partner:    • Emotionally Abused:    • Physically Abused:    • Sexually Abused:          Physical Exam:  Vitals/ General Appearance:   Weight/BMI: Body mass index is 29.44 kg/m².  /67   Pulse 76   Temp 36.3 °C (97.3 °F) (Temporal)   Resp 20   Ht 1.575 m (5' 2\")   Wt 73 kg (160 lb 15 oz)   SpO2 99%   Vitals:    10/28/21 1456 10/28/21 1700 10/28/21 1800 10/28/21 1900   BP:  132/74 109/58 124/67   Pulse:  86 84 76   Resp:  18 18 20   Temp:       TempSrc:       SpO2:  100% 97% 99%   Weight: 73 kg (160 lb 15 " "oz)      Height: 1.575 m (5' 2\")        Oxygen Therapy:  Pulse Oximetry: 99 %, O2 Delivery Device: None - Room Air    Constitutional:   Well developed, Well nourished, No acute distress  HENMT:  Normocephalic, Atraumatic, Oropharynx moist mucous membranes, No oral exudates, Nose normal.  No thyromegaly.  Eyes:  EOMI, Conjunctiva normal, No discharge.  Neck:  Normal range of motion, No cervical tenderness,  no JVD.  Cardiovascular:  Regular rate and rhythm.  Lungs:  no distress.  Abdomen: Soft, No tenderness, No guarding, No rebound, No masses, No hepatosplenomegaly.  Skin: Warm, Dry, No erythema, No rash, no induration.  Neurologic: Alert & oriented x 3, No focal deficits noted, cranial nerves II through X are intact.  Psychiatric: Affect normal, Judgment normal, Mood normal.      MDM (Data Review):     Records reviewed and summarized in current documentation    Lab Data Review:  Recent Results (from the past 24 hour(s))   EKG (NOW)    Collection Time: 10/28/21  2:52 PM   Result Value Ref Range    Report       [facility] Emergency Dept.    Test Date:  2021-10-28  Pt Name:    RORO HAMILTON                Department: ER  MRN:        [id number]                      Room:  Gender:     Male                         Technician: HRR  :        1960                   Requested By:ER TRIAGE PROTOCOL  Order #:    [id number]                    Reading MD: Tonia Esquivel MD    Measurements  Intervals                                Axis  Rate:       94                           P:          46  AL:         144                          QRS:        27  QRSD:       84                           T:          -15  QT:         360  QTc:        451    Interpretive Statements  SINUS RHYTHM  No ectopy or hypertrophy  BORDERLINE T ABNORMALITIES, INFERIOR LEADS  No acute ST changes  Compared to ECG 2021 13:55:31  Minor T wave flattening inferior leads, no other acute change  Electronically Signed On 10- " 19:20:19 PDT by Tonia Esquivel MD     CBC with Differential    Collection Time: 10/28/21  3:18 PM   Result Value Ref Range    WBC 10.6 4.8 - 10.8 K/uL    RBC 5.19 4.70 - 6.10 M/uL    Hemoglobin 10.7 (L) 14.0 - 18.0 g/dL    Hematocrit 36.3 (L) 42.0 - 52.0 %    MCV 69.9 (L) 81.4 - 97.8 fL    MCH 20.6 (L) 27.0 - 33.0 pg    MCHC 29.5 (L) 33.7 - 35.3 g/dL    RDW 46.2 35.9 - 50.0 fL    Platelet Count 379 164 - 446 K/uL    MPV 9.6 9.0 - 12.9 fL    Neutrophils-Polys 80.90 (H) 44.00 - 72.00 %    Lymphocytes 15.60 (L) 22.00 - 41.00 %    Monocytes 2.60 0.00 - 13.40 %    Eosinophils 0.90 0.00 - 6.90 %    Basophils 0.00 0.00 - 1.80 %    Nucleated RBC 0.00 /100 WBC    Neutrophils (Absolute) 8.58 (H) 1.82 - 7.42 K/uL    Lymphs (Absolute) 1.65 1.00 - 4.80 K/uL    Monos (Absolute) 0.28 0.00 - 0.85 K/uL    Eos (Absolute) 0.10 0.00 - 0.51 K/uL    Baso (Absolute) 0.00 0.00 - 0.12 K/uL    NRBC (Absolute) 0.00 K/uL    Hypochromia 1+     Anisocytosis 1+     Microcytosis 1+    Complete Metabolic Panel (CMP)    Collection Time: 10/28/21  3:18 PM   Result Value Ref Range    Sodium 141 135 - 145 mmol/L    Potassium 4.1 3.6 - 5.5 mmol/L    Chloride 103 96 - 112 mmol/L    Co2 19 (L) 20 - 33 mmol/L    Anion Gap 19.0 (H) 7.0 - 16.0    Glucose 215 (H) 65 - 99 mg/dL    Bun 12 8 - 22 mg/dL    Creatinine 0.85 0.50 - 1.40 mg/dL    Calcium 10.0 8.5 - 10.5 mg/dL    AST(SGOT) 16 12 - 45 U/L    ALT(SGPT) 12 2 - 50 U/L    Alkaline Phosphatase 92 30 - 99 U/L    Total Bilirubin 0.4 0.1 - 1.5 mg/dL    Albumin 4.9 3.2 - 4.9 g/dL    Total Protein 8.1 6.0 - 8.2 g/dL    Globulin 3.2 1.9 - 3.5 g/dL    A-G Ratio 1.5 g/dL   Troponin    Collection Time: 10/28/21  3:18 PM   Result Value Ref Range    Troponin T 11 6 - 19 ng/L   ESTIMATED GFR    Collection Time: 10/28/21  3:18 PM   Result Value Ref Range    GFR If African American >60 >60 mL/min/1.73 m 2    GFR If Non African American >60 >60 mL/min/1.73 m 2   DIFFERENTIAL MANUAL    Collection Time: 10/28/21  3:18 PM    Result Value Ref Range    Manual Diff Status PERFORMED    PERIPHERAL SMEAR REVIEW    Collection Time: 10/28/21  3:18 PM   Result Value Ref Range    Peripheral Smear Review see below    PLATELET ESTIMATE    Collection Time: 10/28/21  3:18 PM   Result Value Ref Range    Plt Estimation Normal    MORPHOLOGY    Collection Time: 10/28/21  3:18 PM   Result Value Ref Range    RBC Morphology Present     Poikilocytosis 1+     Ovalocytes 1+    TROPONIN    Collection Time: 10/28/21  5:02 PM   Result Value Ref Range    Troponin T 10 6 - 19 ng/L   Prothrombin Time    Collection Time: 10/28/21  5:02 PM   Result Value Ref Range    PT 13.5 12.0 - 14.6 sec    INR 1.06 0.87 - 1.13   APTT    Collection Time: 10/28/21  5:02 PM   Result Value Ref Range    APTT 30.0 24.7 - 36.0 sec       Imaging/Procedures Review:    Chest Xray:  Reviewed    EKG:   As in HPI.     MDM (Assessment and Plan):     Active Hospital Problems    Diagnosis    • Coronary artery disease of native artery of native heart with stable angina pectoris (HCC) [I25.118]    • Stented coronary artery [Z95.5]    • Hx of CABG [Z95.1]    • Essential hypertension, benign [I10]    • Type 2 diabetes mellitus, without long-term current use of insulin (HCC) [E11.9]          At this time, patient is stable angina.  There is no evidence of Acute coronary syndrome.  Patient does not meet criteria for inpatient admission.  In the setting of stable angina along with no EKG changes, normal LV function, negative troponin T x2, further invasive cardiac work-up will bring more harm than benefits to this patient.  Avoiding hospitalization is the best option for him.  At this time, we will optimize medical therapy for stable angina.  We will increase Imdur to 120 mg p.o. once a day.  We will start Ranexa 500 mg p.o. twice a day.  Patient will be closely monitor in the outpatient setting with his primary cardiology care team.  Patient is deemed to be stable to be discharged from the ER  today.      Kenneth Chang MD.   Cardiology Inpatient Service.  [facility].  [phone].  Katy Mccall.

## 2021-10-29 NOTE — DISCHARGE INSTRUCTIONS
You were seen in the Emergency Department for eye pain, headache, chest pain.    EKG, labs, chest x-ray, CT head were completed without significant acute abnormalities.    Please use 1,000mg of tylenol  every 6 hours as needed for pain.    Use eye ointment as directed.  Start Ranexa again at recommendation from cardiology.  Increase your Imdur or isosorbide nitrate to 120mg once a day.    Please follow up with your primary care physician, cardiologist and eye docto.    Return to the Emergency Department with worsening chest pain, headache, vision changes, or other concerns.

## 2021-11-26 DIAGNOSIS — E11.40 TYPE 2 DIABETES MELLITUS WITH DIABETIC NEUROPATHY, WITHOUT LONG-TERM CURRENT USE OF INSULIN (HCC): ICD-10-CM

## 2021-11-29 RX ORDER — METFORMIN HYDROCHLORIDE 500 MG/1
TABLET, EXTENDED RELEASE ORAL
Qty: 270 TABLET | Refills: 3 | Status: SHIPPED | OUTPATIENT
Start: 2021-11-29

## 2021-11-29 RX ORDER — CLOPIDOGREL BISULFATE 75 MG/1
TABLET ORAL
Qty: 90 TABLET | Refills: 3 | Status: SHIPPED | OUTPATIENT
Start: 2021-11-29

## 2021-12-07 RX ORDER — METOPROLOL SUCCINATE 100 MG/1
TABLET, EXTENDED RELEASE ORAL
Qty: 90 TABLET | Refills: 3 | Status: SHIPPED | OUTPATIENT
Start: 2021-12-07

## 2021-12-20 ENCOUNTER — APPOINTMENT (OUTPATIENT)
Dept: CARDIOLOGY | Facility: MEDICAL CENTER | Age: 61
End: 2021-12-20
Payer: COMMERCIAL

## 2022-01-11 RX ORDER — ROSUVASTATIN CALCIUM 40 MG/1
TABLET, COATED ORAL
Qty: 90 TABLET | Refills: 3 | Status: SHIPPED | OUTPATIENT
Start: 2022-01-11

## 2022-01-11 RX ORDER — ISOSORBIDE MONONITRATE 60 MG/1
TABLET, EXTENDED RELEASE ORAL
Qty: 90 TABLET | Refills: 3 | Status: SHIPPED | OUTPATIENT
Start: 2022-01-11

## 2022-01-11 NOTE — TELEPHONE ENCOUNTER
Received request via: Pharmacy    Was the patient seen in the last year in this department? Yes    Does the patient have an active prescription (recently filled or refills available) for medication(s) requested? No        yes

## 2022-02-10 ENCOUNTER — OFFICE VISIT (OUTPATIENT)
Dept: CARDIOLOGY | Facility: MEDICAL CENTER | Age: 62
End: 2022-02-10
Payer: COMMERCIAL

## 2022-02-10 VITALS
SYSTOLIC BLOOD PRESSURE: 120 MMHG | DIASTOLIC BLOOD PRESSURE: 80 MMHG | RESPIRATION RATE: 16 BRPM | OXYGEN SATURATION: 100 % | HEART RATE: 88 BPM | WEIGHT: 161 LBS | BODY MASS INDEX: 29.63 KG/M2 | HEIGHT: 62 IN

## 2022-02-10 DIAGNOSIS — I25.10 CORONARY ARTERY DISEASE INVOLVING NATIVE CORONARY ARTERY OF NATIVE HEART WITHOUT ANGINA PECTORIS: ICD-10-CM

## 2022-02-10 DIAGNOSIS — I10 ESSENTIAL HYPERTENSION, BENIGN: ICD-10-CM

## 2022-02-10 DIAGNOSIS — E78.5 DYSLIPIDEMIA: ICD-10-CM

## 2022-02-10 PROCEDURE — 99214 OFFICE O/P EST MOD 30 MIN: CPT | Performed by: INTERNAL MEDICINE

## 2022-02-10 RX ORDER — NITROGLYCERIN 0.4 MG/1
0.4 TABLET SUBLINGUAL
COMMUNITY
End: 2022-09-26 | Stop reason: SDUPTHER

## 2022-02-10 RX ORDER — PREDNISONE 20 MG/1
TABLET ORAL
COMMUNITY
Start: 2021-11-15 | End: 2022-02-10

## 2022-02-10 RX ORDER — TRAMADOL HYDROCHLORIDE 50 MG/1
50 TABLET ORAL EVERY 12 HOURS PRN
COMMUNITY
Start: 2022-01-25

## 2022-02-10 RX ORDER — GABAPENTIN 300 MG/1
300 CAPSULE ORAL
COMMUNITY
Start: 2022-02-04

## 2022-02-10 RX ORDER — ONDANSETRON 8 MG/1
TABLET, ORALLY DISINTEGRATING ORAL
COMMUNITY
Start: 2021-11-15 | End: 2022-02-10

## 2022-02-10 RX ORDER — EMPAGLIFLOZIN 25 MG/1
25 TABLET, FILM COATED ORAL EVERY MORNING
COMMUNITY
Start: 2021-12-14

## 2022-02-10 RX ORDER — RIMEGEPANT SULFATE 75 MG/75MG
TABLET, ORALLY DISINTEGRATING ORAL
COMMUNITY
Start: 2021-12-26 | End: 2022-02-10

## 2022-02-10 RX ORDER — LEVOFLOXACIN 500 MG/1
TABLET, FILM COATED ORAL
COMMUNITY
Start: 2021-11-16 | End: 2022-02-10

## 2022-02-10 ASSESSMENT — FIBROSIS 4 INDEX: FIB4 SCORE: 0.76

## 2022-02-11 NOTE — PROGRESS NOTES
"CARDIOLOGY OUTPATIENT FOLLOWUP    PCP: Josep Figueroa M.D.    1. Coronary artery disease involving native coronary artery of native heart without angina pectoris    2. Dyslipidemia    3. Essential hypertension, benign        Gabino Mckeon stable from a cardiovascular standpoint with chronic stable angina, class I-II in severity.  He recently acquired another refill of Ranexa and will try this to see if angina is improved, if not he may discontinue the medication.  I did order a lipid profile.  I will continue the other medications.     Follow up: 1 year    Chief Complaint   Patient presents with   • Coronary Artery Disease     F/V Dx: Coronary artery disease involving native coronary artery without angina pectoris   • Premature Ventricular Contractions (PVCs)   • Dyslipidemia       History: Gabino Mckeon is a 61 y.o. male with history of multiple PCI procedures and CABG presenting for follow-up.  The coronary tree is well revascularized aside from a ramus branch which was treated with a angioplasty in October 2020, but we were unable to fully cross the lesion.  LV ejection fraction is normal.  Since the last evaluation he has experienced \"angina\" at night primarily which seems to be alleviated by a sublingual nitroglycerin tablet.  He has no problems with activities during the day and continues to find a benefit from Imdur.  He has been evaluated in the emergency department at [facility] as well as our emergency department.  One occurrence was for transient neurologic symptoms which were deemed not related to ischemic cerebrovascular disease.      ROS:   All other systems reviewed and negative except as per the HPI    PE:  /80 (BP Location: Left arm, Patient Position: Sitting, BP Cuff Size: Adult)   Pulse 88   Resp 16   Ht 1.575 m (5' 2\")   Wt 73 kg (161 lb)   SpO2 100%   BMI 29.45 kg/m²   Gen: no acute distress  HEENT: Symmetric face. Anicteric sclerae. Moist mucus membranes  NECK: No JVD. No " "lymphadenopathy  CARDIAC: Regular, Normal S1, S2, No murmur  VASCULATURE: carotids are normal bilaterally without bruit  RESP: Clear to auscultation bilaterally  ABD: Soft, non-tender, non-distended  EXT: No edema, no clubbing or cyanosis  SKIN: Warm and dry  NEURO: No gross deficits  PSYCH: Appropriate affect, participates in conversation    The ASCVD Risk score ([place] YANDEL Jr, et al., 2013) failed to calculate.    Past Medical History:   Diagnosis Date   • Achalasia of esophagus    • Anginal syndrome (Formerly [facility])     cleared for surgery   • At risk for sleep apnea    • Back pain    • Breath shortness 02/12/2021    With exertion and related to \"my heart problems\"   • Coronary artery disease of native artery of native heart with stable angina pectoris (Formerly [facility]) 10/28/2021   • Diabetes (Formerly [facility])    • Fall    • H/O heart artery stent     multiple   • High cholesterol    • [place] (hard of hearing) 02/17/2021    Left ear   • Hypertension    • Indigestion    • Pain 02/12/2021    Lower back pain and neck.   • Snoring 02/12/2021    No sleep study   • Urinary incontinence      No Known Allergies  Outpatient Encounter Medications as of 2/10/2022   Medication Sig Dispense Refill   • gabapentin (NEURONTIN) 300 MG Cap Take 300 mg by mouth at bedtime.     • traMADol (ULTRAM) 50 MG Tab Take 50 mg by mouth every 12 hours as needed. as needed for pain     • JARDIANCE 25 MG Tab      • nitroglycerin (NITROSTAT) 0.4 MG SL Tab Place 0.4 mg under the tongue every 5 minutes as needed for Chest Pain.     • isosorbide mononitrate SR (IMDUR) 60 MG TABLET SR 24 HR TAKE 1 TABLET BY MOUTH  DAILY 90 Tablet 3   • rosuvastatin (CRESTOR) 40 MG tablet TAKE 1 TABLET BY MOUTH IN  THE EVENING 90 Tablet 3   • metoprolol SR (TOPROL XL) 100 MG TABLET SR 24 HR TAKE 1 TABLET BY MOUTH  DAILY 90 Tablet 3   • clopidogrel (PLAVIX) 75 MG Tab TAKE 1 TABLET BY MOUTH  DAILY 90 Tablet 3   • metFORMIN ER (GLUCOPHAGE XR) 500 MG TABLET SR 24 HR TAKE 1 TABLET BY MOUTH 3  TIMES DAILY 270 " Tablet 3   • ranolazine (RANEXA) 500 MG TABLET SR 12 HR Take 1 Tablet by mouth 2 times a day. 60 Tablet 0   • glucose blood strip 1 Strip by Other route every day. One touch test strips 100 Strip 3   • amLODIPine (NORVASC) 10 MG Tab Take 1 tablet by mouth every day. TAKE 1 TABLET BY MOUTH DAILY 90 tablet 3   • omeprazole (PRILOSEC) 40 MG delayed-release capsule Take 1 capsule by mouth 2 times a day. 60 capsule 3   • OneTouch Delica Lancets 33G Misc 1 Each every day. 100 Each 3   • [DISCONTINUED] levoFLOXacin (LEVAQUIN) 500 MG tablet  (Patient not taking: Reported on 2/10/2022)     • [DISCONTINUED] ondansetron (ZOFRAN ODT) 8 MG TABLET DISPERSIBLE  (Patient not taking: Reported on 2/10/2022)     • [DISCONTINUED] predniSONE (DELTASONE) 20 MG Tab  (Patient not taking: Reported on 2/10/2022)     • [DISCONTINUED] NURTEC 75 MG TABLET DISPERSIBLE DISSOLVE 1 TABLET BY MOUTH EVERY 48 HOURS AS NEEDED FOR HEADACHE (Patient not taking: Reported on 2/10/2022)     • [DISCONTINUED] Empagliflozin (JARDIANCE) 10 MG Tab Take 10 mg by mouth every day. (Patient not taking: Reported on 2/10/2022)     • [DISCONTINUED] Canagliflozin (INVOKANA) 100 MG Tab Take 100 mg by mouth every day. (Patient not taking: Reported on 2/10/2022) 100 tablet 3     No facility-administered encounter medications on file as of 2/10/2022.     Social History     Socioeconomic History   • Marital status:      Spouse name: Not on file   • Number of children: Not on file   • Years of education: Not on file   • Highest education level: Not on file   Occupational History   • Not on file   Tobacco Use   • Smoking status: Former Smoker     Types: Cigarettes     Quit date: 10/1/2000     Years since quittin.3   • Smokeless tobacco: Never Used   Vaping Use   • Vaping Use: Never used   Substance and Sexual Activity   • Alcohol use: Never   • Drug use: Never   • Sexual activity: Not on file   Other Topics Concern   • Not on file   Social History Narrative   • Not  on file     Social Determinants of Health     Financial Resource Strain:    • Difficulty of Paying Living Expenses: Not on file   Food Insecurity:    • Worried About Running Out of Food in the Last Year: Not on file   • Ran Out of Food in the Last Year: Not on file   Transportation Needs:    • Lack of Transportation (Medical): Not on file   • Lack of Transportation (Non-Medical): Not on file   Physical Activity:    • Days of Exercise per Week: Not on file   • Minutes of Exercise per Session: Not on file   Stress:    • Feeling of Stress : Not on file   Social Connections:    • Frequency of Communication with Friends and Family: Not on file   • Frequency of Social Gatherings with Friends and Family: Not on file   • Attends Tenriism Services: Not on file   • Active Member of Clubs or Organizations: Not on file   • Attends Club or Organization Meetings: Not on file   • Marital Status: Not on file   Intimate Partner Violence:    • Fear of Current or Ex-Partner: Not on file   • Emotionally Abused: Not on file   • Physically Abused: Not on file   • Sexually Abused: Not on file   Housing Stability:    • Unable to Pay for Housing in the Last Year: Not on file   • Number of Places Lived in the Last Year: Not on file   • Unstable Housing in the Last Year: Not on file       Studies  No results found for: CHOLSTRLTOT, LDL, HDL, TRIGLYCERIDE    Lab Results   Component Value Date/Time    SODIUM 141 10/28/2021 03:18 PM    POTASSIUM 4.1 10/28/2021 03:18 PM    CHLORIDE 103 10/28/2021 03:18 PM    CO2 19 (L) 10/28/2021 03:18 PM    GLUCOSE 215 (H) 10/28/2021 03:18 PM    BUN 12 10/28/2021 03:18 PM    CREATININE 0.85 10/28/2021 03:18 PM     Lab Results   Component Value Date/Time    ALKPHOSPHAT 92 10/28/2021 03:18 PM    ASTSGOT 16 10/28/2021 03:18 PM    ALTSGPT 12 10/28/2021 03:18 PM    TBILIRUBIN 0.4 10/28/2021 03:18 PM        For this encounter I reviewed the following medical records recent hospital stay progress notes, BMP and LFT

## 2022-02-17 ENCOUNTER — PHARMACY VISIT (OUTPATIENT)
Dept: PHARMACY | Facility: MEDICAL CENTER | Age: 62
End: 2022-02-17
Payer: COMMERCIAL

## 2022-02-17 PROCEDURE — RXMED WILLOW AMBULATORY MEDICATION CHARGE: Performed by: INTERNAL MEDICINE

## 2022-02-17 RX ORDER — CX-024414 0.2 MG/ML
0.5 INJECTION, SUSPENSION INTRAMUSCULAR
Qty: 0.5 ML | Refills: 0 | Status: SHIPPED | OUTPATIENT
Start: 2022-02-17 | End: 2022-09-26

## 2022-07-08 ENCOUNTER — TELEPHONE (OUTPATIENT)
Dept: CARDIOLOGY | Facility: MEDICAL CENTER | Age: 62
End: 2022-07-08
Payer: COMMERCIAL

## 2022-07-08 NOTE — TELEPHONE ENCOUNTER
BE-    Caller: Gabino Mckeon Dr. Name and Speciality: DR SHEA MARTINEZ With [facility]  Calling about: Patient was admitted for Unstable Angina. Requested Call back to discuss  Callback Number (Personal numbers in routing comments): [phone]  Is this a possible transfer? NO  • If yes, transfer call to transfer center    Thank you    -Zbigniew LOUIS

## 2022-07-08 NOTE — TELEPHONE ENCOUNTER
Attempted to reach provider, but call continued to ring and was unable to leave message.           To BE: Please assist

## 2022-07-19 ENCOUNTER — TELEPHONE (OUTPATIENT)
Dept: CARDIOLOGY | Facility: MEDICAL CENTER | Age: 62
End: 2022-07-19
Payer: COMMERCIAL

## 2022-07-19 NOTE — TELEPHONE ENCOUNTER
Spoke to patient over phone. Patient states he was recently hospitalized in [place], [place]. Per patient, he was at the airport and started to experience chest pain. He took four 81 mg Aspirin and two Nitro before he was transported to [facility]. His EKG and enzymes were normal, but his BP was elevated during episode. Patient states he later underwent an angiogram. He believes he may have had an artery spasm. Patient is now back in [place] and states he is stable. Per patient, site of angiogram is looking good.     Patient wanted BE to be aware. Advised BE does not have sooner appointment available, but assured him I would have him placed on cancellation list. Recommended he reach out sooner if he experiences any symptoms. Patient verbalizes understanding. He will bring imaging CD's to scheduled appointment.     Stat request for records sent to [phone], receipt confirmed.

## 2022-07-19 NOTE — TELEPHONE ENCOUNTER
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    Callback Number: [phone]

## 2022-09-09 ENCOUNTER — PHARMACY VISIT (OUTPATIENT)
Dept: PHARMACY | Facility: MEDICAL CENTER | Age: 62
End: 2022-09-09
Payer: COMMERCIAL

## 2022-09-09 PROCEDURE — RXMED WILLOW AMBULATORY MEDICATION CHARGE: Performed by: INTERNAL MEDICINE

## 2022-09-09 RX ORDER — CX-024414 0.2 MG/ML
INJECTION, SUSPENSION INTRAMUSCULAR
Qty: 0.5 ML | Refills: 0 | Status: SHIPPED | OUTPATIENT
Start: 2022-09-09 | End: 2022-09-26

## 2022-09-26 ENCOUNTER — OFFICE VISIT (OUTPATIENT)
Dept: CARDIOLOGY | Facility: MEDICAL CENTER | Age: 62
End: 2022-09-26
Payer: COMMERCIAL

## 2022-09-26 VITALS
HEART RATE: 77 BPM | HEIGHT: 62 IN | DIASTOLIC BLOOD PRESSURE: 84 MMHG | RESPIRATION RATE: 14 BRPM | OXYGEN SATURATION: 99 % | WEIGHT: 163 LBS | BODY MASS INDEX: 30 KG/M2 | SYSTOLIC BLOOD PRESSURE: 140 MMHG

## 2022-09-26 DIAGNOSIS — I25.10 CORONARY ARTERY DISEASE INVOLVING NATIVE CORONARY ARTERY OF NATIVE HEART WITHOUT ANGINA PECTORIS: ICD-10-CM

## 2022-09-26 DIAGNOSIS — I20.89 ANGINA OF EFFORT (HCC): ICD-10-CM

## 2022-09-26 DIAGNOSIS — Z95.1 HX OF CABG: ICD-10-CM

## 2022-09-26 DIAGNOSIS — E78.5 DYSLIPIDEMIA: ICD-10-CM

## 2022-09-26 DIAGNOSIS — I10 ESSENTIAL HYPERTENSION, BENIGN: ICD-10-CM

## 2022-09-26 PROCEDURE — 99214 OFFICE O/P EST MOD 30 MIN: CPT | Performed by: INTERNAL MEDICINE

## 2022-09-26 RX ORDER — NITROGLYCERIN 0.4 MG/1
0.4 TABLET SUBLINGUAL PRN
Qty: 25 TABLET | Refills: 11 | Status: SHIPPED | OUTPATIENT
Start: 2022-09-26

## 2022-09-26 ASSESSMENT — FIBROSIS 4 INDEX: FIB4 SCORE: 0.77

## 2022-09-26 NOTE — PROGRESS NOTES
"CARDIOLOGY OUTPATIENT FOLLOWUP    PCP: Josep Figueroa M.D.    1. Angina of effort (HCC)    2. Hx of CABG    3. Coronary artery disease involving native coronary artery of native heart without angina pectoris    4. Dyslipidemia    5. Essential hypertension, benign        Gabino Mckeon is escalation of angina to class III in severity and is interested in pursuing revascularization.  I therefore scheduled him for another coronary angiogram with possible PCI.  I will make an attempt to review the films and echocardiogram which were obtained in [facility] recently.  He will continue on the present medication regimen but I did provide a refill for Imdur.    Follow up: 3 months    Chief Complaint   Patient presents with    Coronary Artery Disease     F/V Dx: Coronary artery disease involving native coronary artery of native heart without angina pectoris       History: Gabino Mckeon is a 62 y.o. male with history of multiple PCI procedures and LIMA to LAD, normal LVEF presenting for follow-up.  In October 2020 he underwent failed PTCA of the restenosis of the first diagonal.  Laser atherectomy was attempted but the equipment failed and has not been replaced.  While traveling to [place] he developed recurrent angina and now is symptoms much more frequently.  He has been using more nitroglycerin than in the past.  He did undergo an angiogram but no PCI.      ROS:   10 point review systems is otherwise negative except as per the HPI    PE:  BP (!) 140/84 (BP Location: Left arm, Patient Position: Sitting, BP Cuff Size: Adult)   Pulse 77   Resp 14   Ht 1.575 m (5' 2\")   Wt 73.9 kg (163 lb)   SpO2 99%   BMI 29.81 kg/m²   Gen: no acute distress  HEENT: Symmetric face. Anicteric sclerae. Moist mucus membranes  NECK: No JVD. No lymphadenopathy  CARDIAC: Regular, Normal S1, S2, No murmur  VASCULATURE: carotids are normal bilaterally without bruit  RESP: Clear to auscultation bilaterally  ABD: Soft, non-tender, " "non-distended  EXT: No edema, no clubbing or cyanosis  SKIN: Warm and dry  NEURO: No gross deficits  PSYCH: Appropriate affect, participates in conversation    The ASCVD Risk score (Meade DC Jr, et al., 2013) failed to calculate.    Past Medical History:   Diagnosis Date    Achalasia of esophagus     Anginal syndrome (HCC)     cleared for surgery    At risk for sleep apnea     Back pain     Breath shortness 02/12/2021    With exertion and related to \"my heart problems\"    Coronary artery disease of native artery of native heart with stable angina pectoris ([facility]) 10/28/2021    Diabetes ([facility])     Fall     H/O heart artery stent     multiple    High cholesterol     [place] (hard of hearing) 02/17/2021    Left ear    Hypertension     Indigestion     Pain 02/12/2021    Lower back pain and neck.    Snoring 02/12/2021    No sleep study    Urinary incontinence      No Known Allergies  Outpatient Encounter Medications as of 9/26/2022   Medication Sig Dispense Refill    nitroglycerin (NITROSTAT) 0.4 MG SL Tab Place 1 Tablet under the tongue as needed for Chest Pain. 25 Tablet 11    gabapentin (NEURONTIN) 300 MG Cap Take 300 mg by mouth at bedtime.      traMADol (ULTRAM) 50 MG Tab Take 50 mg by mouth every 12 hours as needed. as needed for pain      JARDIANCE 25 MG Tab       isosorbide mononitrate SR (IMDUR) 60 MG TABLET SR 24 HR TAKE 1 TABLET BY MOUTH  DAILY 90 Tablet 3    rosuvastatin (CRESTOR) 40 MG tablet TAKE 1 TABLET BY MOUTH IN  THE EVENING 90 Tablet 3    metoprolol SR (TOPROL XL) 100 MG TABLET SR 24 HR TAKE 1 TABLET BY MOUTH  DAILY 90 Tablet 3    clopidogrel (PLAVIX) 75 MG Tab TAKE 1 TABLET BY MOUTH  DAILY 90 Tablet 3    metFORMIN ER (GLUCOPHAGE XR) 500 MG TABLET SR 24 HR TAKE 1 TABLET BY MOUTH 3  TIMES DAILY 270 Tablet 3    ranolazine (RANEXA) 500 MG TABLET SR 12 HR Take 1 Tablet by mouth 2 times a day. 60 Tablet 0    glucose blood strip 1 Strip by Other route every day. One touch test strips 100 Strip 3    amLODIPine " (NORVASC) 10 MG Tab Take 1 tablet by mouth every day. TAKE 1 TABLET BY MOUTH DAILY 90 tablet 3    omeprazole (PRILOSEC) 40 MG delayed-release capsule Take 1 capsule by mouth 2 times a day. 60 capsule 3    OneTouch Delica Lancets 33G Misc 1 Each every day. 100 Each 3    [DISCONTINUED] COVID-19 mRNA vaccine, Moderna, (MODERNA COVID-19 VACCINE) 100 MCG/0.5ML Suspension injection Inject  into the shoulder, thigh, or buttocks. (Patient not taking: Reported on 2022) 0.5 mL 0    [DISCONTINUED] COVID-19 mRNA vaccine, Moderna, (MODERNA COVID-19 VACCINE) 100 MCG/0.5ML Suspension injection Inject 0.5 mL into the shoulder, thigh, or buttocks. (Patient not taking: Reported on 2022) 0.5 mL 0    [DISCONTINUED] nitroglycerin (NITROSTAT) 0.4 MG SL Tab Place 0.4 mg under the tongue every 5 minutes as needed for Chest Pain.       No facility-administered encounter medications on file as of 2022.     Social History     Socioeconomic History    Marital status:      Spouse name: Not on file    Number of children: Not on file    Years of education: Not on file    Highest education level: Not on file   Occupational History    Not on file   Tobacco Use    Smoking status: Former     Types: Cigarettes     Quit date: 10/1/2000     Years since quittin.0    Smokeless tobacco: Never   Vaping Use    Vaping Use: Never used   Substance and Sexual Activity    Alcohol use: Never    Drug use: Never    Sexual activity: Not on file   Other Topics Concern    Not on file   Social History Narrative    Not on file     Social Determinants of Health     Financial Resource Strain: Not on file   Food Insecurity: Not on file   Transportation Needs: Not on file   Physical Activity: Not on file   Stress: Not on file   Social Connections: Not on file   Intimate Partner Violence: Not on file   Housing Stability: Not on file       Studies  No results found for: CHOLSTRLTOT, LDL, HDL, TRIGLYCERIDE    Lab Results   Component Value Date/Time     SODIUM 141 10/28/2021 03:18 PM    POTASSIUM 4.1 10/28/2021 03:18 PM    CHLORIDE 103 10/28/2021 03:18 PM    CO2 19 (L) 10/28/2021 03:18 PM    GLUCOSE 215 (H) 10/28/2021 03:18 PM    BUN 12 10/28/2021 03:18 PM    CREATININE 0.85 10/28/2021 03:18 PM     Lab Results   Component Value Date/Time    ALKPHOSPHAT 92 10/28/2021 03:18 PM    ASTSGOT 16 10/28/2021 03:18 PM    ALTSGPT 12 10/28/2021 03:18 PM    TBILIRUBIN 0.4 10/28/2021 03:18 PM        For this encounter I reviewed the following medical records BMP, Lipid profile, and LFT   For this encounter I directly reviewed  prior angiograms .  Severe stenosis of the diffusely diseased first diagonal/ramus. I otherwise agree with the interpretation in the EHR.    40-54 minutes of physician total time spent on the date of the encounter ([id number])

## 2022-09-27 ENCOUNTER — TELEPHONE (OUTPATIENT)
Dept: CARDIOLOGY | Facility: MEDICAL CENTER | Age: 62
End: 2022-09-27
Payer: COMMERCIAL

## 2022-09-27 NOTE — TELEPHONE ENCOUNTER
Patient is  scheduled on 10-13-22 for a [facility] w/poss with Dr. Good. Patient was told to hold jardiance and metformin day of procedure and to hold metformin for 48hrs after. Patient to check in at 6:30 for an 8:30 procedure. H&P was done on 9-26-22 by Dr. Good. Pre admit to call patient.

## 2022-10-06 ENCOUNTER — PATIENT MESSAGE (OUTPATIENT)
Dept: CARDIOLOGY | Facility: MEDICAL CENTER | Age: 62
End: 2022-10-06
Payer: COMMERCIAL

## 2022-10-06 NOTE — PATIENT COMMUNICATION
FW: Blood Pressure Check  Received: Today  ROSA Pereira.  Roxanna Gilman R.N.  Have him continue to monitor BP daily and OK to take an extra metoprolol 25-50 mg if needed for SBP >130. SC

## 2022-10-11 ENCOUNTER — PRE-ADMISSION TESTING (OUTPATIENT)
Dept: ADMISSIONS | Facility: MEDICAL CENTER | Age: 62
End: 2022-10-11
Attending: INTERNAL MEDICINE
Payer: COMMERCIAL

## 2022-10-11 DIAGNOSIS — Z01.812 PRE-OPERATIVE LABORATORY EXAMINATION: ICD-10-CM

## 2022-10-11 DIAGNOSIS — Z01.810 PRE-OPERATIVE CARDIOVASCULAR EXAMINATION: ICD-10-CM

## 2022-10-11 LAB
ALBUMIN SERPL BCP-MCNC: 4.7 G/DL (ref 3.2–4.9)
ALBUMIN/GLOB SERPL: 2 G/DL
ALP SERPL-CCNC: 74 U/L (ref 30–99)
ALT SERPL-CCNC: 7 U/L (ref 2–50)
ANION GAP SERPL CALC-SCNC: 11 MMOL/L (ref 7–16)
APTT PPP: 30.6 SEC (ref 24.7–36)
AST SERPL-CCNC: 12 U/L (ref 12–45)
BILIRUB SERPL-MCNC: 0.3 MG/DL (ref 0.1–1.5)
BUN SERPL-MCNC: 12 MG/DL (ref 8–22)
CALCIUM SERPL-MCNC: 9.5 MG/DL (ref 8.5–10.5)
CHLORIDE SERPL-SCNC: 104 MMOL/L (ref 96–112)
CO2 SERPL-SCNC: 23 MMOL/L (ref 20–33)
CREAT SERPL-MCNC: 0.76 MG/DL (ref 0.5–1.4)
EKG IMPRESSION: NORMAL
ERYTHROCYTE [DISTWIDTH] IN BLOOD BY AUTOMATED COUNT: 47.1 FL (ref 35.9–50)
GFR SERPLBLD CREATININE-BSD FMLA CKD-EPI: 101 ML/MIN/1.73 M 2
GLOBULIN SER CALC-MCNC: 2.4 G/DL (ref 1.9–3.5)
GLUCOSE SERPL-MCNC: 189 MG/DL (ref 65–99)
HCT VFR BLD AUTO: 38.3 % (ref 42–52)
HGB BLD-MCNC: 12 G/DL (ref 14–18)
INR PPP: 1.1 (ref 0.87–1.13)
MCH RBC QN AUTO: 23.9 PG (ref 27–33)
MCHC RBC AUTO-ENTMCNC: 31.3 G/DL (ref 33.7–35.3)
MCV RBC AUTO: 76.3 FL (ref 81.4–97.8)
PLATELET # BLD AUTO: 305 K/UL (ref 164–446)
PMV BLD AUTO: 10.5 FL (ref 9–12.9)
POTASSIUM SERPL-SCNC: 4.2 MMOL/L (ref 3.6–5.5)
PROT SERPL-MCNC: 7.1 G/DL (ref 6–8.2)
PROTHROMBIN TIME: 14.1 SEC (ref 12–14.6)
RBC # BLD AUTO: 5.02 M/UL (ref 4.7–6.1)
SODIUM SERPL-SCNC: 138 MMOL/L (ref 135–145)
WBC # BLD AUTO: 5.7 K/UL (ref 4.8–10.8)

## 2022-10-11 PROCEDURE — 93005 ELECTROCARDIOGRAM TRACING: CPT

## 2022-10-11 PROCEDURE — 93010 ELECTROCARDIOGRAM REPORT: CPT | Performed by: INTERNAL MEDICINE

## 2022-10-11 PROCEDURE — 80053 COMPREHEN METABOLIC PANEL: CPT

## 2022-10-11 PROCEDURE — 85027 COMPLETE CBC AUTOMATED: CPT

## 2022-10-11 PROCEDURE — 85610 PROTHROMBIN TIME: CPT

## 2022-10-11 PROCEDURE — 85730 THROMBOPLASTIN TIME PARTIAL: CPT

## 2022-10-11 PROCEDURE — 36415 COLL VENOUS BLD VENIPUNCTURE: CPT

## 2022-10-11 RX ORDER — RIMEGEPANT SULFATE 75 MG/75MG
75 TABLET, ORALLY DISINTEGRATING ORAL
COMMUNITY
Start: 2022-10-04

## 2022-10-11 RX ORDER — TIZANIDINE 4 MG/1
4 TABLET ORAL 2 TIMES DAILY PRN
COMMUNITY
Start: 2022-09-22

## 2022-10-11 ASSESSMENT — FIBROSIS 4 INDEX: FIB4 SCORE: 0.77

## 2022-10-13 ENCOUNTER — APPOINTMENT (OUTPATIENT)
Dept: CARDIOLOGY | Facility: MEDICAL CENTER | Age: 62
End: 2022-10-13
Attending: INTERNAL MEDICINE
Payer: COMMERCIAL

## 2022-10-13 ENCOUNTER — HOSPITAL ENCOUNTER (OUTPATIENT)
Facility: MEDICAL CENTER | Age: 62
End: 2022-10-13
Attending: INTERNAL MEDICINE | Admitting: INTERNAL MEDICINE
Payer: COMMERCIAL

## 2022-10-13 VITALS
BODY MASS INDEX: 29.66 KG/M2 | DIASTOLIC BLOOD PRESSURE: 96 MMHG | HEIGHT: 62 IN | RESPIRATION RATE: 16 BRPM | SYSTOLIC BLOOD PRESSURE: 161 MMHG | HEART RATE: 72 BPM | TEMPERATURE: 96.5 F | OXYGEN SATURATION: 97 % | WEIGHT: 161.16 LBS

## 2022-10-13 DIAGNOSIS — I25.118 CORONARY ARTERY DISEASE OF NATIVE ARTERY OF NATIVE HEART WITH STABLE ANGINA PECTORIS (HCC): ICD-10-CM

## 2022-10-13 DIAGNOSIS — I20.89 ANGINA OF EFFORT (HCC): ICD-10-CM

## 2022-10-13 LAB — EKG IMPRESSION: NORMAL

## 2022-10-13 PROCEDURE — 160046 HCHG PACU - 1ST 60 MINS PHASE II

## 2022-10-13 PROCEDURE — 160036 HCHG PACU - EA ADDL 30 MINS PHASE I

## 2022-10-13 PROCEDURE — 93571 IV DOP VEL&/PRESS C FLO 1ST: CPT | Mod: 26,52,LC | Performed by: INTERNAL MEDICINE

## 2022-10-13 PROCEDURE — 700111 HCHG RX REV CODE 636 W/ 250 OVERRIDE (IP)

## 2022-10-13 PROCEDURE — 93459 L HRT ART/GRFT ANGIO: CPT | Mod: 26,59 | Performed by: INTERNAL MEDICINE

## 2022-10-13 PROCEDURE — A9270 NON-COVERED ITEM OR SERVICE: HCPCS | Performed by: INTERNAL MEDICINE

## 2022-10-13 PROCEDURE — 160047 HCHG PACU  - EA ADDL 30 MINS PHASE II

## 2022-10-13 PROCEDURE — 160035 HCHG PACU - 1ST 60 MINS PHASE I

## 2022-10-13 PROCEDURE — 700102 HCHG RX REV CODE 250 W/ 637 OVERRIDE(OP): Performed by: INTERNAL MEDICINE

## 2022-10-13 PROCEDURE — 92924 PRQ TRLUML C ATHRC 1 ART&/BR: CPT | Mod: LD | Performed by: INTERNAL MEDICINE

## 2022-10-13 PROCEDURE — 700117 HCHG RX CONTRAST REV CODE 255: Performed by: INTERNAL MEDICINE

## 2022-10-13 PROCEDURE — 93010 ELECTROCARDIOGRAM REPORT: CPT | Mod: 59 | Performed by: INTERNAL MEDICINE

## 2022-10-13 PROCEDURE — 99152 MOD SED SAME PHYS/QHP 5/>YRS: CPT | Performed by: INTERNAL MEDICINE

## 2022-10-13 PROCEDURE — 700101 HCHG RX REV CODE 250

## 2022-10-13 PROCEDURE — 93005 ELECTROCARDIOGRAM TRACING: CPT | Performed by: INTERNAL MEDICINE

## 2022-10-13 PROCEDURE — 99153 MOD SED SAME PHYS/QHP EA: CPT

## 2022-10-13 PROCEDURE — 160002 HCHG RECOVERY MINUTES (STAT)

## 2022-10-13 RX ORDER — BIVALIRUDIN 250 MG/5ML
INJECTION, POWDER, LYOPHILIZED, FOR SOLUTION INTRAVENOUS
Status: COMPLETED
Start: 2022-10-13 | End: 2022-10-13

## 2022-10-13 RX ORDER — LIDOCAINE HYDROCHLORIDE 20 MG/ML
INJECTION, SOLUTION INFILTRATION; PERINEURAL
Status: COMPLETED
Start: 2022-10-13 | End: 2022-10-13

## 2022-10-13 RX ORDER — MIDAZOLAM HYDROCHLORIDE 1 MG/ML
INJECTION INTRAMUSCULAR; INTRAVENOUS
Status: COMPLETED
Start: 2022-10-13 | End: 2022-10-13

## 2022-10-13 RX ORDER — DIPHENHYDRAMINE HYDROCHLORIDE 50 MG/ML
INJECTION INTRAMUSCULAR; INTRAVENOUS
Status: COMPLETED
Start: 2022-10-13 | End: 2022-10-13

## 2022-10-13 RX ORDER — HEPARIN SODIUM 200 [USP'U]/100ML
INJECTION, SOLUTION INTRAVENOUS
Status: COMPLETED
Start: 2022-10-13 | End: 2022-10-13

## 2022-10-13 RX ORDER — SODIUM CHLORIDE 9 MG/ML
1000 INJECTION, SOLUTION INTRAVENOUS CONTINUOUS
Status: DISCONTINUED | OUTPATIENT
Start: 2022-10-13 | End: 2022-10-13 | Stop reason: HOSPADM

## 2022-10-13 RX ORDER — SODIUM CHLORIDE 9 MG/ML
INJECTION, SOLUTION INTRAVENOUS CONTINUOUS
Status: ACTIVE | OUTPATIENT
Start: 2022-10-13 | End: 2022-10-13

## 2022-10-13 RX ORDER — HEPARIN SODIUM 1000 [USP'U]/ML
INJECTION, SOLUTION INTRAVENOUS; SUBCUTANEOUS
Status: COMPLETED
Start: 2022-10-13 | End: 2022-10-13

## 2022-10-13 RX ORDER — CLOPIDOGREL BISULFATE 75 MG/1
75 TABLET ORAL DAILY
Status: DISCONTINUED | OUTPATIENT
Start: 2022-10-14 | End: 2022-10-13 | Stop reason: HOSPADM

## 2022-10-13 RX ADMIN — BIVALIRUDIN 250 MG: 250 INJECTION, POWDER, LYOPHILIZED, FOR SOLUTION INTRAVENOUS at 09:51

## 2022-10-13 RX ADMIN — MIDAZOLAM 1 MG: 1 INJECTION, SOLUTION INTRAMUSCULAR; INTRAVENOUS at 10:40

## 2022-10-13 RX ADMIN — HEPARIN SODIUM 2000 UNITS: 200 INJECTION, SOLUTION INTRAVENOUS at 09:29

## 2022-10-13 RX ADMIN — LIDOCAINE HYDROCHLORIDE: 20 INJECTION, SOLUTION INFILTRATION; PERINEURAL at 09:29

## 2022-10-13 RX ADMIN — BIVALIRUDIN 250 MG: 250 INJECTION, POWDER, LYOPHILIZED, FOR SOLUTION INTRAVENOUS at 10:39

## 2022-10-13 RX ADMIN — IOHEXOL 100 ML: 350 INJECTION, SOLUTION INTRAVENOUS at 10:41

## 2022-10-13 RX ADMIN — HEPARIN SODIUM: 1000 INJECTION, SOLUTION INTRAVENOUS; SUBCUTANEOUS at 09:29

## 2022-10-13 RX ADMIN — DIPHENHYDRAMINE HYDROCHLORIDE 50 MG: 50 INJECTION INTRAMUSCULAR; INTRAVENOUS at 09:28

## 2022-10-13 RX ADMIN — NITROGLYCERIN 10 ML: 20 INJECTION INTRAVENOUS at 09:29

## 2022-10-13 RX ADMIN — FENTANYL CITRATE 100 MCG: 50 INJECTION, SOLUTION INTRAMUSCULAR; INTRAVENOUS at 10:29

## 2022-10-13 RX ADMIN — ASPIRIN 81 MG: 81 TABLET, COATED ORAL at 11:52

## 2022-10-13 RX ADMIN — MIDAZOLAM 2 MG: 1 INJECTION, SOLUTION INTRAMUSCULAR; INTRAVENOUS at 09:29

## 2022-10-13 ASSESSMENT — PAIN DESCRIPTION - PAIN TYPE
TYPE: ACUTE PAIN;REFERRED PAIN
TYPE: CHRONIC PAIN
TYPE: SURGICAL PAIN
TYPE: ACUTE PAIN
TYPE: SURGICAL PAIN
TYPE: ACUTE PAIN
TYPE: SURGICAL PAIN;ACUTE PAIN
TYPE: SURGICAL PAIN;ACUTE PAIN
TYPE: SURGICAL PAIN
TYPE: ACUTE PAIN
TYPE: SURGICAL PAIN

## 2022-10-13 ASSESSMENT — FIBROSIS 4 INDEX: FIB4 SCORE: 0.94

## 2022-10-13 NOTE — DISCHARGE INSTRUCTIONS
HOME CARE INSTRUCTIONS    ACTIVITY: Rest and take it easy for the first 24 hours.  A responsible adult is recommended to remain with you during that time.  It is normal to feel sleepy.  We encourage you to not do anything that requires balance, judgment or coordination.    FOR 24 HOURS DO NOT:  Drive, operate machinery or run household appliances.  Drink beer or alcoholic beverages.  Make important decisions or sign legal documents.    SPECIAL INSTRUCTIONS:     General Care Instructions  Maintain a bandage over the incision site for 24 hours.  It's normal to find a small bruise or dime-sized lump at the insertion site. This should disappear within a few weeks.  Do not apply lotions or powders to the site.  Do not immerse the catheter insertion site in water (bathtub/swimming) for five days. It is ok to shower 24 hours after the procedure.  You may resume your normal diet immediately; on the day of your procedure, drink 6-10 glasses of water to help flush the contrast liquid out of your system.  If the doctor inserted the catheter in through your groin:  Walking short distances on a flat surface is OK. Limit going up/down stairs for the first 2 days.  DO NOT do yard work, drive, squat, lift heavy objects, or play sports for 2 days; or until your health care provider tells you it is OK.    Medications  If your current medications need to be changed, you will be provided with an updated list of your medications prior to discharge.  If you take warfarin (Coumadin), resume taking your usual dose the evening after the procedure.  DO NOT STOP taking prescribed blood thinning (anti-platelet) medications unless instructed by your cardiologist.  These medications include:  Aspirin, Clopidogrel (Plavix), Ticagrelor (Brilinta), or Prasugrel (Effient)   If you take one of the following anticoagulants, RESUME 24 HOURS after your procedure:  Apixiban (Eliquis), Rivaroxaban (Xarelto), Dabigatran (Pradaxa), Edoxaban (Savaysa)  If  you take metformin (Glucophage), RESUME 48 HOURS after your procedure.    When to call your healthcare provider  Call your cardiologist right away at [phone] if you have any of the following:   Problems/Concerns taking any of your prescribed heart medicines.   The insertion site has increasing pain, swelling, redness, bleeding, or drainage.   Your arm or leg below where the insertion site changes color, is cool, or is numb.   You have chest pain or shortness of breath that does not go away with rest.   Your pulse feels irregular -- very slow (less than 60 beats/minute) or very fast (over 100 beats/minute).   You have dizziness, fainting, or you are very tired.   You are coughing up blood or yellow or green mucus.   You have chills or a fever over 101°F (38.3°C).    If there is bleeding at the catheter insertion site, apply pressure for 10 minutes.  If bleeding persists, call 911, and continue to hold pressure until advanced medical support arrives.        Exercising Safely After Percutaneous Coronary Intervention (PCI)  After percutaneous coronary intervention (PCI), which involves angioplasty and often stenting, it's important to focus on your heart health. Exercise can help strengthen your heart. It can also help you feel good and improve your overall health. Talk with your health care provider or cardiac rehab team member about good options for you.  Start slowly. Work up to more vigorous exercise as you get stronger. Aim for at least 150 minutes of exercise each week.  Include aerobic activities. These make the heart beat faster. They work the heart and lungs, and improve the body's ability to use oxygen. Good choices include walking, swimming, and biking .  Always follow your doctor's recommendation for exercise.   You have been referred to cardiac rehabilitation, which is important for your recovery.  You may contact [facility]'s Intensive Cardiac Rehab Program at [phone] to learn more and schedule a  visit.        Lifestyle Management After Percutaneous Coronary Intervention (PCI)  Percutaneous coronary intervention (PCI)  involves angioplasty and often stenting. This procedure can open arteries and relieve symptoms. But, it doesn't cure coronary artery disease. New blockages can still form. You need to take steps to prevent this by managing risk factors. Doing so will help make your heart and arteries healthier. Your doctor may prescribe cardiac rehabilitation to help with this lifelong process.  Understanding risk factors  Some risk factors for coronary artery disease can be controlled. These include smoking, high blood pressure, cholesterol, diabetes, and obesity. They can be managed with medication, diet, and exercise. Support and counseling can also play a role. The effort will pay off! Managing risk factors can help you be more active, feel better, and reduce the risk of heart attack.    If you smoke, quit!  If your doctor has been urging you to quit smoking, it's for good reasons. Smoking damages your heart, blood vessels, and lungs. The good news is that quitting can halt or even reverse the damage of smoking. To quit now:  Get medical help. Ask your doctor for advice on stop-smoking programs. Also ask about medications or nicotine replacement therapy products that may help you quit smoking.  Get support. Join a support group. Ask for help from your family and friends.  Don't give up. It often takes several tries to succeed in quitting smoking.  Avoid secondhand smoke. Ask family and friends not to smoke around you.    DIET: To avoid nausea, slowly advance diet as tolerated, avoiding spicy or greasy foods for the first day.  Add more substantial food to your diet according to your physician's instructions.  Babies can be fed formula or breast milk as soon as they are hungry.  INCREASE FLUIDS AND FIBER TO AVOID CONSTIPATION.    SURGICAL DRESSING/BATHING: May remove dressing in 24 hours and shower.  Do not  submerge in water such as bath tubs, hot tubs, or swimming pools until cleared by your provider.     MEDICATIONS: Resume taking daily medication.  Take prescribed pain medication with food.  If no medication is prescribed, you may take non-aspirin pain medication if needed.  PAIN MEDICATION CAN BE VERY CONSTIPATING.  Take a stool softener or laxative such as senokot, pericolace, or milk of magnesia if needed.    Prescription given: no new medication ordered    Last pain medication given: no pain medication given in recovery    A follow-up appointment should be arranged with your doctor; call to schedule.    You should CALL YOUR PHYSICIAN if you develop:  Fever greater than 101 degrees F.  Pain not relieved by medication, or persistent nausea or vomiting.  Excessive bleeding (blood soaking through dressing) or unexpected drainage from the wound.  Extreme redness or swelling around the incision site, drainage of pus or foul smelling drainage.  Inability to urinate or empty your bladder within 8 hours.  Problems with breathing or chest pain.    You should call 911 if you develop problems with breathing or chest pain.  If you are unable to contact your doctor or surgical center, you should go to the nearest emergency room or urgent care center.  Physician's telephone #: [phone]    MILD FLU-LIKE SYMPTOMS ARE NORMAL.  YOU MAY EXPERIENCE GENERALIZED MUSCLE ACHES, THROAT IRRITATION, HEADACHE AND/OR SOME NAUSEA.    If any questions arise, call your doctor.  If your doctor is not available, please feel free to call the Surgical Center at [phone].  The Center is open Monday through Friday from 7AM to 7PM.      A registered nurse may call you a few days after your surgery to see how you are doing after your procedure.    You may also receive a survey in the mail within the next two weeks and we ask that you take a few moments to complete the survey and return it to us.  Our goal is to provide you with very good care  and we value your comments.     Depression / Suicide Risk    As you are discharged from this [facility], it is important to learn how to keep safe from harming yourself.    Recognize the warning signs:  Abrupt changes in personality, positive or negative- including increase in energy   Giving away possessions  Change in eating patterns- significant weight changes-  positive or negative  Change in sleeping patterns- unable to sleep or sleeping all the time   Unwillingness or inability to communicate  Depression  Unusual sadness, discouragement and loneliness  Talk of wanting to die  Neglect of personal appearance   Rebelliousness- reckless behavior  Withdrawal from people/activities they love  Confusion- inability to concentrate     If you or a loved one observes any of these behaviors or has concerns about self-harm, here's what you can do:  Talk about it- your feelings and reasons for harming yourself  Remove any means that you might use to hurt yourself (examples: pills, rope, extension cords, firearm)  Get professional help from the community (Mental Health, Substance Abuse, psychological counseling)  Do not be alone:Call your Safe Contact- someone whom you trust who will be there for you.  Call your local CRISIS HOTLINE 025-3298 or 467-676-8032  Call your local Children's Mobile Crisis Response Team [place] (849) 631-1882 or www.WebEvents  Call the toll free National Suicide Prevention Hotlines   National Suicide Prevention Lifeline 449-302-PLNE (8817)  National Hope Line Network 800-SUICIDE (531-2442)    I acknowledge receipt and understanding of these Home Care instructions.

## 2022-10-13 NOTE — OR NURSING
Pt arrived from PACU to phase II. Up to chair. Gauze CDI. No CO pain. Call light within reach.    Notified son that pt will be ready for DC at 1700.    Pt up ambulating to restroom SBA    DC paperwork discussed with pt and son, questions encouraged and answered. Dc'd via wheelchair with son.

## 2022-10-13 NOTE — OR NURSING
Patient arrived to PACU in stable condition. Site to Right groin. CDI and soft. He has been up to bedside and walked. Doing great. He is ready for stage 2.

## 2022-10-13 NOTE — PROCEDURES
"CARDIAC CATHETERIZATION REPORT    Referring Provider: Augusto Good M.D.    PROCEDURE PHYSICIAN: Augusto Good MD, [facility], [facility]  ASSISTANT: None    IMPRESSIONS:  1.  Progressive angina related to obstructive two-vessel coronary disease  2.  Unsuccessful complete crossing of the lesion in the diagonal despite angioplasty of the proximal lesion edge as well as laser atherectomy   3.  Severe stenosis in the OM1 by IFR, failure of angioplasty percutaneous intervention due to inability to deliver equipment    Recommendations:  Medical therapy of angina    Pre-procedure diagnosis:  Progressive angina  Post-procedure diagnosis: Same    Procedure performed  Selective coronary angiography  Left heart catheterization  Bypass graft angiography  ELCA laser atherectomy-diagonal  PTCA of the diagonal  IFR of the OM1  Perclose of the right common femoral artery    Conscious sedation was supervised by myself and administered by trained personnel using fentanyl and versed between 0924 and 1042. The patient tolerated sedation without complication.     Procedure Description  1. Access: 6 Jordanian right femoral artery Micropuncture technique was utilized following local anesthesia with lidocaine.  Fluoroscopic guidance was utilized for femoral access Dynamic ultrasound was utilized to gain access    2. Diagnostic description: The catheter was passed to the central circulation with the aide of J tipped 0.35\" wire. A 6F JR4, 6F JL4, and 6F LIMA were used to inject the coronary circulation , bypass grafts.     3. Description of Intervention: After confirming therapeutic anticoagulation intervention proceeded. A  7 Jordanian EBU 3.5 guiding catheter  was used. The lesion in the diagonal 1  was crossed with a 0.014\" BMW wire.  A second BMW wire was passed into the LAD for added support.  A 1.5 balloon failed to cross the lesion in total.  I wedged into the proximal edge and performed repeated rounds of angioplasty.  I then performed " atherectomy using a 0.9 ELCA device with 2 passes at 40/40.  I then attempted to cross the lesion with a 1.2 balloon which appeared to straddle the jailing stent struts and performed angioplasty.  Next, I attempted to pass a 1.5 balloon though repeated attempts failed and ultimately the equipment prolapsed.  I then abandon efforts to repair the diagonal and then performed IFR of the OM1 vessel.  A PhoneAndPhone Omni wire was passed and the IFR recorded at 0.88.  I then made efforts to deliver a 2.5 compliant and then noncompliant balloon however these were unsuccessful due to the acute angulation into the circumflex.  Despite the added support of a guide extension catheter the balloon would not traverse into the circumflex vessel.  I then abandon efforts to intervene on this.    4. Closing: At completion of the procedure the relevant equipment was removed from the body and hemostasis achieved by Perclose    Findings   Hemodynamics:   Aorta: 106/66 mmHg  LVEDP: 22 mmHg  No significant pullback gradient across the aortic valve    Coronary Anatomy   Left Main:  Distal 30% stenosis   LAD:  Proximal 30% stenosis in-stent.  Competitive filling distally via LIMA.  The first diagonal is jailed and has 99% ostial stenosis and diffuse 40% in-stent restenosis.  There appears to be an occlusion of a subbranch with collateral filling.  The second diagonal has 80% stenosis and is stable from 2020   LCx:  Nondominant vessel with 30% ostial stenosis, widely patent stent in the midportion.  Just after the stent and extending into the first obtuse marginal vessel there is a 70% stenosis.  The IFR is 0.88.  This lesion has progressed compared to the 2020 study   RCA: Dominant, minimal luminal irregularities in the AV groove.  The right sided posterolateral extension has a long 40% stenosis.  The PDA is normal    Results of intervention to the  daigonal 1 :  Pre: 99% stenosis and KITA III flow  Post: 99% residual stenosis and KITA III flow.  No dissection or distal embolization.    Technical Factors  1. Complications: None  2. Estimated Blood Loss: <50 cc  3. Specimens: None  4. Contrast Volume: 100 ml  5. Medications: Heparin 1000 units Bivalirudin

## 2022-10-13 NOTE — OR NURSING
Arrived to PACU in stable condition. Site to RFA stable with Perclose device in place. 2+ pulses to Right pedal. Denies pain or nausea. Bedrest till 3pm. Wife called and updated.

## 2022-11-10 ENCOUNTER — PATIENT MESSAGE (OUTPATIENT)
Dept: HEALTH INFORMATION MANAGEMENT | Facility: OTHER | Age: 62
End: 2022-11-10

## 2022-11-17 ENCOUNTER — HOSPITAL ENCOUNTER (OUTPATIENT)
Dept: RADIOLOGY | Facility: MEDICAL CENTER | Age: 62
End: 2022-11-17
Attending: PHYSICIAN ASSISTANT
Payer: COMMERCIAL

## 2022-11-17 DIAGNOSIS — G89.29 CHRONIC PAIN OF LEFT KNEE: ICD-10-CM

## 2022-11-17 DIAGNOSIS — M25.562 CHRONIC PAIN OF LEFT KNEE: ICD-10-CM

## 2022-11-17 PROCEDURE — 73721 MRI JNT OF LWR EXTRE W/O DYE: CPT | Mod: LT

## 2023-06-29 NOTE — H&P
Hospital Medicine History & Physical Note    Date of Service  10/1/2020    Primary Care Physician  No primary care provider on file.    Consultants  Cardiology    Code Status  No Order    Chief Complaint  Chief Complaint   Patient presents with   • Chest Pain     Pt reports CP and SOB that has increased the past 2 wks. pt reports extensive heart hx with multiple stents. Pt reports near syncopal episode upon exertion.    • Near Syncopal       History of Presenting Illness  Mr. Gabino Mckeon is a 60 y.o. male history hypertension, diabetes, coronary disease status post CABG who presented on 10/1/2020 with chest pain x2 weeks.  Chest pain is left-sided radiates to right arm.  Described as dull and intermittent multiple times per day.  He does endorse shortness of breath, diaphoresis.   Patient states he has been using his outpatient nitroglycerin with some relief but her chest pain was recurrent so he came to the emergency room.  Patient also had an episode of presyncope 2 days ago.  He states he was talking with his neighbor and then felt dizzy/lightheaded, sweats and palpitations so he got up to go home and felt like he was going to pass out started taking 10-15 steps he did lay on the floor until his symptoms resolved.  Patient did call his daughter during this time and was lying down for 10-15 minutes.  Denies loss of consciousness.  Initial troponins negative.  EKG with no specific ST changes.  Chest x-ray showed no acute cardiopulmonary disease.  Patient did have mild hyponatremia at 134 and a high anion gap metabolic acidosis.  Cardiology was consulted and patient was started on a heparin drip.    Review of Systems  Review of Systems   Constitutional: Positive for diaphoresis. Negative for chills and fever.   HENT: Negative for congestion and sore throat.    Eyes: Negative for blurred vision.   Respiratory: Positive for shortness of breath. Negative for cough.    Cardiovascular: Positive for chest pain and  palpitations. Negative for leg swelling.   Gastrointestinal: Negative for abdominal pain, constipation, diarrhea, nausea and vomiting.   Genitourinary: Negative for dysuria, frequency and urgency.   Musculoskeletal: Negative for falls.   Skin: Negative for rash.   Neurological: Positive for dizziness. Negative for loss of consciousness, weakness and headaches.   Psychiatric/Behavioral: Negative for depression. The patient is not nervous/anxious.    All other systems reviewed and are negative.      Past Medical History   has a past medical history of H/O heart artery stent.    Surgical History  History of cardiac catheterization with stent and CABG    Family History  Father had myocardial infarction    Social History   reports that he has quit smoking. He has never used smokeless tobacco. He reports that he does not drink alcohol or use drugs.    Allergies  No Known Allergies    Medications  Prior to Admission Medications   Prescriptions Last Dose Informant Patient Reported? Taking?   ALPRAZolam (XANAX) 0.25 MG Tab 9/29/2020 at am Patient Yes No   Sig: Take 1 mg by mouth 1 time daily as needed.   HYDROcodone-acetaminophen (NORCO) 5-325 MG Tab per tablet 9/27/2020 at Unknown time Patient Yes Yes   Sig: Take 1 Tab by mouth 1 time daily as needed.   MAGNESIUM PO 9/29/2020 at 0930 Patient Yes Yes   Sig: Take 1 Tab by mouth every day.   Ranolazine 1000 MG TABLET SR 12 HR 9/30/2020 at 0930 Patient Yes No   Sig: Take 1,000 mg by mouth 2 Times a Day.   amLODIPine (NORVASC) 10 MG Tab 9/30/2020 at 0930 Patient Yes Yes   Sig: Take 10 mg by mouth every day.   aspirin EC (ECOTRIN) 81 MG Tablet Delayed Response 10/1/2020 at 0200 Patient Yes Yes   Sig: Take 81 mg by mouth every day. Took 2 tabs 10/1/2020   clopidogrel (PLAVIX) 75 MG Tab 9/30/2020 at 0930 Patient Yes No   Sig: Take 75 mg by mouth every day.   isosorbide mononitrate SR (IMDUR) 60 MG TABLET SR 24 HR 9/30/2020 at 2100 Patient Yes No   Sig: Take 60 mg by mouth every  day.   metFORMIN ER (GLUCOPHAGE XR) 500 MG TABLET SR 24 HR 9/30/2020 at 2100 Patient Yes No   Sig: Take 500 mg by mouth 3 times a day.   metoprolol SR (TOPROL XL) 50 MG TABLET SR 24 HR 9/30/2020 at 0930 Patient Yes No   Sig: Take 50 mg by mouth every day.   nitroglycerin (NITROSTAT) 0.4 MG SL Tab 9/30/2020 at 2100 Patient Yes No   Sig: Place 0.4 mg under tongue as needed.   omeprazole (PRILOSEC) 40 MG delayed-release capsule 9/30/2020 at 2100 Patient Yes No   Sig: Take 40 mg by mouth 2 times a day.   rosuvastatin (CRESTOR) 40 MG tablet 9/29/2020 at 2100 Patient Yes No   Sig: Take 40 mg by mouth every evening.   tramadol (ULTRAM) 50 MG Tab 10/1/2020 at 0500 Patient Yes No   Sig: Take 50 mg by mouth 3 times a day as needed.      Facility-Administered Medications: None       Physical Exam  Temp:  [36 °C (96.8 °F)] 36 °C (96.8 °F)  Pulse:  [79-94] 79  Resp:  [16-29] 20  BP: (118-160)/(70-92) 118/70  SpO2:  [98 %-99 %] 98 %    Physical Exam  Vitals signs and nursing note reviewed.   Constitutional:       General: He is not in acute distress.     Appearance: Normal appearance.   HENT:      Head: Normocephalic and atraumatic.      Nose: Nose normal.      Mouth/Throat:      Mouth: Mucous membranes are moist.      Pharynx: Oropharynx is clear.   Eyes:      Extraocular Movements: Extraocular movements intact.      Conjunctiva/sclera: Conjunctivae normal.   Neck:      Musculoskeletal: Normal range of motion and neck supple.   Cardiovascular:      Rate and Rhythm: Normal rate and regular rhythm.      Pulses: Normal pulses.      Heart sounds: Normal heart sounds. No murmur. No friction rub. No gallop.    Pulmonary:      Effort: Pulmonary effort is normal. No respiratory distress.      Breath sounds: Normal breath sounds. No wheezing or rales.   Chest:      Chest wall: No tenderness.   Abdominal:      General: Abdomen is flat. Bowel sounds are normal. There is no distension.      Palpations: Abdomen is soft. There is no mass.       Tenderness: There is no abdominal tenderness. There is no guarding.   Musculoskeletal: Normal range of motion.   Skin:     General: Skin is warm.      Capillary Refill: Capillary refill takes less than 2 seconds.   Neurological:      General: No focal deficit present.      Mental Status: He is alert and oriented to person, place, and time. Mental status is at baseline.      Cranial Nerves: No cranial nerve deficit.      Motor: No weakness.   Psychiatric:         Mood and Affect: Mood normal.         Behavior: Behavior normal.         Laboratory:  Recent Labs     10/01/20  0825   WBC 8.5   RBC 5.45   HEMOGLOBIN 13.0*   HEMATOCRIT 41.3*   MCV 75.8*   MCH 23.9*   MCHC 31.5*   RDW 46.0   PLATELETCT 405   MPV 10.4     Recent Labs     10/01/20  0825   SODIUM 134*   POTASSIUM 4.4   CHLORIDE 98   CO2 19*   GLUCOSE 151*   BUN 13   CREATININE 0.89   CALCIUM 9.8     Recent Labs     10/01/20  0825   ALTSGPT 13   ASTSGOT 19   ALKPHOSPHAT 91   TBILIRUBIN 0.4   GLUCOSE 151*         No results for input(s): NTPROBNP in the last 72 hours.      Recent Labs     10/01/20  0825   TROPONINT 11       Imaging:  DX-CHEST-PORTABLE (1 VIEW)   Final Result      1.  There is no acute cardiopulmonary process.            Assessment/Plan:  I anticipate this patient will require at least two midnights for appropriate medical management, necessitating inpatient admission.    Type 2 diabetes mellitus, without long-term current use of insulin (HCC)  Assessment & Plan  Hold outpatient metformin.  Start on insulin sliding scale.  Check A1c    GERD (gastroesophageal reflux disease)  Assessment & Plan  Continue outpatient omeprazole    High anion gap metabolic acidosis  Assessment & Plan  Mild hyponatremia at 134.  Fluid resuscitation.  Check lactic acid and monitor BMP    Postural dizziness with presyncope  Assessment & Plan  Monitor on telemetry  Echocardiogram.  Check orthostatics.  Fluid resuscitation.    Unstable angina (HCC)  Assessment &  Plan  Cardiologist following.  Start on heparin drip, monitor on telemetry, trend cardiac enzymes  Potential catheterization this afternoon.  Echocardiogram  Continue core cardiac measures and outpatient medications.     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

## 2024-01-16 NOTE — DISCHARGE INSTRUCTIONS
Discharge Instructions    Discharged to home by car with relative. Discharged via wheelchair, hospital escort: Yes.  Special equipment needed: Not Applicable    Be sure to schedule a follow-up appointment with your primary care doctor or any specialists as instructed.     Discharge Plan:   Diet Plan: Discussed  Activity Level: Discussed  Confirmed Follow up Appointment: Appointment Scheduled  Confirmed Symptoms Management: Discussed  Medication Reconciliation Updated: Yes  Influenza Vaccine Indication: Patient Refuses    I understand that a diet low in cholesterol, fat, and sodium is recommended for good health. Unless I have been given specific instructions below for another diet, I accept this instruction as my diet prescription.   Other diet: Cardiac/Diabetic    Special Instructions: Diagnosis:  Acute Coronary Syndrome (ACS) is a diagnosis that encompasses cardiac-related chest pain and heart attack. ACS occurs when the blood flow to the heart muscle is severely reduced or cut off completely due to a slow process called atherosclerosis.  Atherosclerosis is a disease in which the coronary arteries become narrow from a buildup of fat, cholesterol, and other substances that combine to form plaque. If the plaque breaks, a blood clot will form and block the blood flow to the heart muscle. This lack of blood flow can cause damage or death to the heart muscle which is called a heart attack or Myocardial Infarction (MI). There are two different types of MIs:  ST Elevation Myocardial Infarction or STEMI (the most severe type of heart attack) and Non-ST Elevation Myocardial Infarction or NSTEMI.    Treatment Plan:  · Cardiac Diet  - Low fat, low salt, low cholesterol   · Cardiac Rehab  - Your doctor has ordered you a referral to [facility].  Call [phone] to schedule an appointment.  · Attend my follow-up appointment with my Cardiologist.  · Take my medications as prescribed by my doctor  · Exercise daily  · Lower my bad  Lv-3/6/23  Nv-none   cholesterol and raise my good cholesterol, lower my blood pressure, Reduce stress and Control my diabetes    Medications:  Certain medications are used to treat ACS.  Remember to always take medications as prescribed and never stop talking medications unless told by your doctor.    You have been prescribed the following medicatons:    Aspirin - Aspirin is used as a blood thinning medication and you will require this medication indefinitely.  Anti-platelet/blood thinner - Your Anti-platelet/Blood thinning medication is called Effient, and is used in combination with aspirin to prevent clots from forming in your heart and/or around your stent.  Your doctor will determine how long you need to be on this medicine.  Beta-Blocker - Beta-Blocker Metoprolol SR is used to lower blood pressure and heart rate, and/or helps your heart heal after a heart attack.  Statin - Statin Rosuvastatin is used to lower cholesterol.    · Is patient discharged on Warfarin / Coumadin?   No       Acute Coronary Syndrome  Acute coronary syndrome (ACS) is a serious problem in which there is suddenly not enough blood and oxygen reaching the heart. ACS can result in chest pain or a heart attack.  This condition is a medical emergency. If you have any symptoms of this condition, get help right away.  What are the causes?  This condition may be caused by:  · A buildup of fat and cholesterol inside the arteries (atherosclerosis). This is the most common cause. The buildup (plaque) can cause blood vessels in the heart (coronary arteries) to become narrow or blocked, which reduces blood flow to the heart. Plaque can also break off and lead to a clot, which can block an artery and cause a heart attack or stroke.  · Sudden tightening of the muscles around the coronary arteries (coronary spasm).  · Tearing of a coronary artery (spontaneous coronary artery dissection).  · Very low blood pressure (hypotension).  · An abnormal heartbeat  (arrhythmia).  · Other medical conditions that cause a decrease of oxygen to the heart, such as anemiaorrespiratory failure.  · Using cocaine or methamphetamine.  What increases the risk?  The following factors may make you more likely to develop this condition:  · Age. The risk for ACS increases as you get older.  · History of chest pain, heart attack, peripheral artery disease, or stroke.  · Having taken chemotherapy or immune-suppressing medicines.  · Being male.  · Family history of chest pain, heart disease, or stroke.  · Smoking.  · Not exercising enough.  · Being overweight.  · High cholesterol.  · High blood pressure (hypertension).  · Diabetes.  · Excessive alcohol use.  What are the signs or symptoms?  Common symptoms of this condition include:  · Chest pain. The pain may last a long time, or it may stop and come back (recur). It may feel like:  ? Crushing or squeezing.  ? Tightness, pressure, fullness, or heaviness.  · Arm, neck, jaw, or back pain.  · Heartburn or indigestion.  · Shortness of breath.  · Nausea.  · Sudden cold sweats.  · Light-headedness.  · Dizziness or passing out.  · Tiredness (fatigue).  Sometimes there are no symptoms.  How is this diagnosed?  This condition may be diagnosed based on:  · Your medical history and symptoms.  · Imaging tests, such as:  ? An electrocardiogram (ECG). This measures the heart's electrical activity.  ? X-rays.  ? CT scan.  ? A coronary angiogram. For this test, dye is injected into the heart arteries and then X-rays are taken.  ? Myocardial perfusion imaging. This test shows how well blood flows through your heart muscle.  · Blood tests. These may be repeated at certain time intervals.  · Exercise stress testing.  · Echocardiogram. This is a test that uses sound waves to produce detailed images of the heart.  How is this treated?  Treatment for this condition may include:  · Oxygen therapy.  · Medicines, such as:  ? Antiplatelet medicines and blood-thinning  medicines, such as aspirin. These help prevent blood clots.  ? Medicine that dissolves any blood clots (fibrinolytic therapy).  ? Blood pressure medicines.  ? Nitroglycerin. This helps widen blood vessels to improve blood flow.  ? Pain medicine.  ? Cholesterol-lowering medicine.  · Surgery, such as:  ? Coronary angioplasty with stent placement. This involves placing a small piece of metal that looks like mesh or a spring into a narrow coronary artery. This widens the artery and keeps it open.  ? Coronary artery bypass surgery. This involves taking a section of a blood vessel from a different part of your body and placing it on the blocked coronary artery to allow blood to flow around the blockage.  · Cardiac rehabilitation. This is a program that includes exercise training, education, and counseling to help you recover.  Follow these instructions at home:  Eating and drinking  · Eat a heart-healthy diet that includes whole grains, fruits and vegetables, lean proteins, and low-fat or nonfat dairy products.  · Limit how much salt (sodium) you eat as told by your health care provider. Follow instructions from your health care provider about any other eating or drinking restrictions, such as limiting foods that are high in fat and processed sugars.  · Use healthy cooking methods such as roasting, grilling, broiling, baking, poaching, steaming, or stir-frying.  · Work with a dietitian to follow a heart-healthy eating plan.  Medicines  · Take over-the-counter and prescription medicines only as told by your health care provider.  · Do not take these medicines unless your health care provider approves:  ? Vitamin supplements that contain vitamin A or vitamin E.  ? NSAIDs, such as ibuprofen, naproxen, or celecoxib.  ? Hormone replacement therapy that contains estrogen.  · If you are taking blood thinners:  ? Talk with your health care provider before you take any medicines that contain aspirin or NSAIDs. These medicines  increase your risk for dangerous bleeding.  ? Take your medicine exactly as told, at the same time every day.  ? Avoid activities that could cause injury or bruising, and follow instructions about how to prevent falls.  ? Wear a medical alert bracelet, and carry a card that lists what medicines you take.  Activity  · Follow your cardiac rehabilitation program. Do exercises as told by your physical therapist.  · Ask your health care provider what activities and exercises are safe for you. Follow his or her instructions about lifting, driving, or climbing stairs.  Lifestyle  · Do not use any products that contain nicotine or tobacco, such as cigarettes, e-cigarettes, and chewing tobacco. If you need help quitting, ask your health care provider.  · Do not drink alcohol if your health care provider tells you not to drink.  · If you drink alcohol:  ? Limit how much you have to 0-1 drink a day.  ? Be aware of how much alcohol is in your drink. In the [place] one drink equals one 12 oz bottle of beer (355 mL), one 5 oz glass of wine (148 mL), or one 1½ oz glass of hard liquor (44 mL).  · Maintain a healthy weight. If you need to lose weight, work with your health care provider to do so safely.  General instructions  · Tell all the health care providers who provide care for you about your heart condition, including your dentist. This may affect the medicines or treatment you receive.  · Manage any other health conditions you have, such as hypertension or diabetes. These conditions affect your heart.  · Pay attention to your mental health. You may be at higher risk for depression.  ? Find ways to manage stress.  ? Talk to your health care provider about depression screening and treatment.  · Keep your vaccinations up to date.  ? Get the flu shot (influenza vaccine) every year.  ? Get the pneumococcal vaccine if you are age 65 or older.  · If directed, monitor your blood pressure at home.  · Keep all follow-up visits as told by  your health care provider. This is important.  Contact a health care provider if you:  · Feel overwhelmed or sad.  · Have trouble doing your daily activities.  Get help right away if you:  · Have pain in your chest, neck, arm, jaw, stomach, or back that recurs, and:  ? It lasts for more than a few minutes.  ? It is not relieved by taking the medicineyour health care provider prescribed.  · Have unexplained:  ? Heavy sweating.  ? Heartburn or indigestion.  ? Nausea or vomiting.  ? Shortness of breath.  ? Difficulty breathing.  ? Fatigue.  ? Nervousness or anxiety.  ? Weakness.  ? Diarrhea.  ? Dark stools or blood in your stool.  · Have sudden light-headedness or dizziness.  · Have blood pressure that is higher than 180/120.  · Faint.  · Have thoughts about hurting yourself.  These symptoms may represent a serious problem that is an emergency. Do not wait to see if the symptoms will go away. Get medical help right away. Call your local emergency services (911 in the [place] Do not drive yourself to the hospital.   Summary  · Acute coronary syndrome (ACS) is when there is not enough blood and oxygen being supplied to the heart. ACS can result in chest pain or a heart attack.  · Acute coronary syndrome is a medical emergency. If you have any symptoms of this condition, get help right away.  · Treatment includes medicines and procedures to open the blocked arteries and restore blood flow.  This information is not intended to replace advice given to you by your health care provider. Make sure you discuss any questions you have with your health care provider.  Document Released: 12/18/2006 Document Revised: 12/30/2019 Document Reviewed: 12/30/2019  [employer] Patient Education © 2020 [employer]      Angina    Angina is very bad discomfort or pain in the chest, neck, arm, jaw, or back. The discomfort is caused by a lack of blood in the middle layer of the heart wall (myocardium).  What are the causes?  This condition is  caused by a buildup of fat and cholesterol (plaque) in your arteries (atherosclerosis). This buildup narrows the arteries and makes it hard for blood to flow.  What increases the risk?  You are more likely to develop this condition if:  · You have high levels of cholesterol in your blood.  · You have high blood pressure (hypertension).  · You have diabetes.  · You have a family history of heart disease.  · You are not active, or you do not exercise enough.  · You feel sad (depressed).  · You have been treated with high energy rays (radiation) on the left side of your chest.  Other risk factors are:  · Using tobacco.  · Being very overweight (obese).  · Eating a diet high in unhealthy fats (saturated fats).  · Having stress, or being exposed to things that cause stress.  · Using drugs, such as cocaine.  Women have a greater risk for angina if:  · They are older than 55.  · They have stopped having their period (are in postmenopause).  What are the signs or symptoms?  Common symptoms of this condition in both men and women may include:  · Chest pain, which may:  ? Feel like a crushing or squeezing in the chest.  ? Feel like a tightness, pressure, fullness, or heaviness in the chest.  ? Last for more than a few minutes at a time.  ? Stop and come back (recur) after a few minutes.  · Pain in the neck, arm, jaw, or back.  · Heartburn or upset stomach (indigestion) for no reason.  · Being short of breath.  · Feeling sick to your stomach (nauseous).  · Sudden cold sweats.  Women and people with diabetes may have other symptoms that are not usual, such as feeling:  · Tired (fatigue).  · Worried or nervous (anxious) for no reason.  · Weak for no reason.  · Dizzy or passing out (fainting).  How is this treated?  This condition may be treated with:  · Medicines. These are given to:  ? Prevent blood clots.  ? Prevent heart attack.  ? Relax blood vessels and improve blood flow to the heart (nitrates).  ? Reduce blood  pressure.  ? Improve the pumping action of the heart.  ? Reduce fat and cholesterol in the blood.  · A procedure to widen a narrowed or blocked artery in the heart (angioplasty).  · Surgery to allow blood to go around a blocked artery (coronary artery bypass surgery).  Follow these instructions at home:  Medicines  · Take over-the-counter and prescription medicines only as told by your doctor.  · Do not take these medicines unless your doctor says that you can:  ? NSAIDs. These include:  § Ibuprofen.  § Naproxen.  ? Vitamin supplements that have vitamin A, vitamin E, or both.  ? Hormone therapy that contains estrogen with or without progestin.  Eating and drinking    · Eat a heart-healthy diet that includes:  ? Lots of fresh fruits and vegetables.  ? Whole grains.  ? Low-fat (lean) protein.  ? Low-fat dairy products.  · Follow instructions from your doctor about what you cannot eat or drink.  Activity  · Follow an exercise program that your doctor tells you.  · Talk with your doctor about joining a program to help improve the health of your heart (cardiac rehab).  · When you feel tired, take a break. Plan breaks if you know you are going to feel tired.  Lifestyle    · Do not use any products that contain nicotine or tobacco. This includes cigarettes, e-cigarettes, and chewing tobacco. If you need help quitting, ask your doctor.  · If your doctor says you can drink alcohol:  ? Limit how much you use to:  § 0-1 drink a day for women who are not pregnant.  § 0-2 drinks a day for men.  ? Be aware of how much alcohol is in your drink. In the [place] one drink equals:  § One 12 oz bottle of beer (355 mL).  § One 5 oz glass of wine (148 mL).  § One 1½ oz glass of hard liquor (44 mL).  General instructions  · Stay at a healthy weight. If your doctor tells you to do so, work with him or her to lose weight.  · Learn to deal with stress. If you need help, ask your doctor.  · Keep your vaccines up to date. Get a flu shot every  year.  · Talk with your doctor if you feel sad. Take a screening test to see if you are at risk for depression.  · Work with your doctor to manage any other health problems that you have. These may include diabetes or high blood pressure.  · Keep all follow-up visits as told by your doctor. This is important.  Get help right away if:  · You have pain in your chest, neck, arm, jaw, or back, and the pain:  ? Lasts more than a few minutes.  ? Comes back.  ? Does not get better after you take medicine under your tongue (sublingual nitroglycerin).  ? Keeps getting worse.  ? Comes more often.  · You have any of these problems for no reason:  ? Sweating a lot.  ? Heartburn or upset stomach.  ? Shortness of breath.  ? Trouble breathing.  ? Feeling sick to your stomach.  ? Throwing up (vomiting).  ? Feeling more tired than normal.  ? Feeling nervous or worrying more than normal.  ? Weakness.  · You are suddenly dizzy or light-headed.  · You pass out.  These symptoms may be an emergency. Do not wait to see if the symptoms will go away. Get medical help right away. Call your local emergency services (911 in the [place] Do not drive yourself to the hospital.  Summary  · Angina is very bad discomfort or pain in the chest, neck, arm, neck, or back.  · Symptoms include chest pain, heartburn or upset stomach for no reason, and shortness of breath.  · Women or people with diabetes may have symptoms that are not usual, such as feeling nervous or worried for no reason, weak for no reason, or tired.  · Take all medicines only as told by your doctor.  · You should eat a heart-healthy diet and follow an exercise program.  This information is not intended to replace advice given to you by your health care provider. Make sure you discuss any questions you have with your health care provider.  Document Released: 06/05/2009 Document Revised: 08/05/2019 Document Reviewed: 08/05/2019  [employer] Patient Education © 2020 [employer]    Prasugrel  oral tablets  What is this medicine?  PRASUGREL (PRA meño grel) helps to prevent blood clots. This medicine is used to prevent heart attack, stroke, or other vascular events in people who are at high risk.  This medicine may be used for other purposes; ask your health care provider or pharmacist if you have questions.  COMMON BRAND NAME(S): Effient  What should I tell my health care provider before I take this medicine?  They need to know if you have any of these conditions:  · bleeding disorders  · kidney disease  · liver disease  · recent trauma or surgery  · stomach or intestinal ulcers  · stroke or transient ischemic attack  · an unusual or allergic reaction to prasugrel, other medicines, foods, dyes, or preservatives  · pregnant or trying to get pregnant  · breast-feeding  How should I use this medicine?  Take this medicine by mouth with a drink of water. Follow the directions on the prescription label. You may take this medicine with or without food. If it upsets your stomach, take it with food. Do not crush, cut, or chew the tablet. If you are not able to take the tablet whole, talk to your prescriber about other ways to take this medicine. Take your medicine at regular intervals. Do not take your medicine more often than directed. Do not stop taking except on your doctor's advice.  Talk to your pediatrician regarding the use of this medicine in children. Special care may be needed.  Overdosage: If you think you have taken too much of this medicine contact a poison control center or emergency room at once.  NOTE: This medicine is only for you. Do not share this medicine with others.  What if I miss a dose?  If you miss a dose, take it as soon as you can. If it is almost time for your next dose, take only that dose. Do not take double or extra doses.  What may interact with this medicine?  Do not take this medicine with any of the following medications:  · defibrotide  This medicine may also interact with the  following medications:  · certain medicines that treat or prevent blood clots like warfarin  · narcotic medicines for pain  · NSAIDS, medicines for pain and inflammation, like ibuprofen or naproxen  This list may not describe all possible interactions. Give your health care provider a list of all the medicines, herbs, non-prescription drugs, or dietary supplements you use. Also tell them if you smoke, drink alcohol, or use illegal drugs. Some items may interact with your medicine.  What should I watch for while using this medicine?  Visit your doctor or health care professional for regular check ups. Do not stop taking your medicine unless your doctor tells you to.  Notify your doctor or health care professional and seek emergency treatment if you develop breathing problems; changes in vision; chest pain; severe, sudden headache; pain, swelling, warmth in the leg; trouble speaking; sudden numbness or weakness of the face, arm, or leg. These can be signs that your condition has gotten worse.  If you are going to have surgery or dental work, tell your doctor or health care professional that you are taking this medicine.  What side effects may I notice from receiving this medicine?  Side effects that you should report to your doctor or health care professional as soon as possible:  · allergic reactions like skin rash, itching or hives, swelling of the face, lips, or tongue  · signs and symptoms of bleeding such as bloody or black, tarry stools; red or dark-brown urine; spitting up blood or brown material that looks like coffee grounds; red spots on the skin; unusual bruising or bleeding from the eye, gums, or nose  Side effects that usually do not require medical attention (report to your doctor or health care professional if they continue or are bothersome):  · diarrhea  · headache  · nausea, vomiting  · pain in back, arms or legs  This list may not describe all possible side effects. Call your doctor for medical  advice about side effects. You may report side effects to FDA at 6-730-JHG-4710.  Where should I keep my medicine?  Keep out of the reach of children.  Store at room temperature between 15 and 30 degrees C (59 and 86 degrees F). Keep this medicine in the original container. Keep container closed and do not remove the gray cylinder from the bottle. Throw away any unused medicine after the expiration date.  NOTE: This sheet is a summary. It may not cover all possible information. If you have questions about this medicine, talk to your doctor, pharmacist, or health care provider.  © 2020 [employer] (2019-04-08 17:07:32)      Depression / Suicide Risk    As you are discharged from this [facility] facility, it is important to learn how to keep safe from harming yourself.    Recognize the warning signs:  · Abrupt changes in personality, positive or negative- including increase in energy   · Giving away possessions  · Change in eating patterns- significant weight changes-  positive or negative  · Change in sleeping patterns- unable to sleep or sleeping all the time   · Unwillingness or inability to communicate  · Depression  · Unusual sadness, discouragement and loneliness  · Talk of wanting to die  · Neglect of personal appearance   · Rebelliousness- reckless behavior  · Withdrawal from people/activities they love  · Confusion- inability to concentrate     If you or a loved one observes any of these behaviors or has concerns about self-harm, here's what you can do:  · Talk about it- your feelings and reasons for harming yourself  · Remove any means that you might use to hurt yourself (examples: pills, rope, extension cords, firearm)  · Get professional help from the community (Mental Health, Substance Abuse, psychological counseling)  · Do not be alone:Call your Safe Contact- someone whom you trust who will be there for you.  · Call your local CRISIS HOTLINE 593-5963 or 465-901-0951  · Call your local  Children's Mobile Crisis Response Team [place] (178) 597-7868 or [email or web address]  · Call the toll free National Suicide Prevention Hotlines   · National Suicide Prevention Lifeline 419-301-FMAH (3941)  · National Hope Line Network 800-SUICIDE (033-4966)

## (undated) DEVICE — SUCTION INSTRUMENT YANKAUER BULBOUS TIP W/O VENT (50EA/CA)

## (undated) DEVICE — SHEET PEDIATRIC LAPAROTOMY - (10/CA)

## (undated) DEVICE — KIT EVACUATER 3 SPRING PVC LF 1/8 DRAIN SIZE (10EA/CA)"

## (undated) DEVICE — ELECTRODE DUAL RETURN W/ CORD - (50/PK)

## (undated) DEVICE — SODIUM CHL IRRIGATION 0.9% 1000ML (12EA/CA)

## (undated) DEVICE — KIT SURGIFLO W/OUT THROMBIN - (6EA/CA)

## (undated) DEVICE — DISSECT TOOL MIDAS REX

## (undated) DEVICE — NEPTUNE 4 PORT MANIFOLD - (20/PK)

## (undated) DEVICE — MIDAS LUBRICATOR DIFFUSER PACK (4EA/CA)

## (undated) DEVICE — SUTURE 2-0 VICRYL PLUS CT-1 36 (36PK/BX)"

## (undated) DEVICE — KIT ANESTHESIA W/CIRCUIT & 3/LT BAG W/FILTER (20EA/CA)

## (undated) DEVICE — GVL 4 STAT DISPOSABLE - (10/BX)

## (undated) DEVICE — SPONGE PEANUT - (5/PK 50PK/CA)

## (undated) DEVICE — LACTATED RINGERS INJ 1000 ML - (14EA/CA 60CA/PF)

## (undated) DEVICE — LACTATED RINGERS INJ. 500 ML - (24EA/CA)

## (undated) DEVICE — SUTURE GENERAL

## (undated) DEVICE — CANISTER SUCTION 3000ML MECHANICAL FILTER AUTO SHUTOFF MEDI-VAC NONSTERILE LF DISP  (40EA/CA)

## (undated) DEVICE — CHLORAPREP 26 ML APPLICATOR - ORANGE TINT(25/CA)

## (undated) DEVICE — ELECTRODE 850 FOAM ADHESIVE - HYDROGEL RADIOTRNSPRNT (50/PK)

## (undated) DEVICE — SUTURE 3-0 VICRYL PLUS RB-1 - 8 X 18 INCH (12/BX)

## (undated) DEVICE — SENSOR SPO2 NEO LNCS ADHESIVE (20/BX) SEE USER NOTES

## (undated) DEVICE — GLOVE BIOGEL SZ 8 SURGICAL PF LTX - (50PR/BX 4BX/CA)

## (undated) DEVICE — RESTRAINTS LIMB DISP. - (12/BX 4BX/CA)

## (undated) DEVICE — MASK ANESTHESIA ADULT  - (100/CA)

## (undated) DEVICE — TUBING CLEARLINK DUO-VENT - C-FLO (48EA/CA)

## (undated) DEVICE — SCREW DISTRACTION 14MM YELLOW - STERILE (10EA/BX) (5TX4=20)

## (undated) DEVICE — GLOVE BIOGEL INDICATOR SZ 8 SURGICAL PF LTX - (50/BX 4BX/CA)

## (undated) DEVICE — DRESSING TRANSPARENT FILM TEGADERM 4 X 4.75" (50EA/BX)"

## (undated) DEVICE — SET LEADWIRE 5 LEAD BEDSIDE DISPOSABLE ECG (1SET OF 5/EA)

## (undated) DEVICE — DERMABOND ADVANCED - (12EA/BX)

## (undated) DEVICE — GOWN WARMING STANDARD FLEX - (30/CA)

## (undated) DEVICE — BOVIE BLADE COATED &INSULATED - 25/PK

## (undated) DEVICE — PACK NEURO - (2EA/CA)

## (undated) DEVICE — TOOL DISSECT MATCH HEAD

## (undated) DEVICE — SLEEVE, VASO, THIGH, MED

## (undated) DEVICE — SPONGE GAUZESTER 4 X 4 4PLY - (128PK/CA)

## (undated) DEVICE — PROTECTOR ULNA NERVE - (36PR/CA)

## (undated) DEVICE — SET EXTENSION WITH 2 PORTS (48EA/CA) ***PART #2C8610 IS A SUBSTITUTE*****

## (undated) DEVICE — INTRAOP NEURO IN OR 1:1 PER 15 MIN

## (undated) DEVICE — TUBE EMG NIM TRIVANTAGE 8MM (3EA/PK)

## (undated) DEVICE — Device

## (undated) DEVICE — NEEDLE SPINAL NON-SAFETY 18 GA X 3 IN (25EA/BX)

## (undated) DEVICE — TUBING C&T SET FLYING LEADS DRAIN TUBING (10EA/BX)

## (undated) DEVICE — PLATE PIN GOLDIN (2TX3=6)